# Patient Record
Sex: MALE | Race: WHITE | NOT HISPANIC OR LATINO | Employment: OTHER | URBAN - METROPOLITAN AREA
[De-identification: names, ages, dates, MRNs, and addresses within clinical notes are randomized per-mention and may not be internally consistent; named-entity substitution may affect disease eponyms.]

---

## 2017-06-08 ENCOUNTER — GENERIC CONVERSION - ENCOUNTER (OUTPATIENT)
Dept: OTHER | Facility: OTHER | Age: 82
End: 2017-06-08

## 2017-08-15 ENCOUNTER — ALLSCRIPTS OFFICE VISIT (OUTPATIENT)
Dept: OTHER | Facility: OTHER | Age: 82
End: 2017-08-15

## 2017-10-17 ENCOUNTER — GENERIC CONVERSION - ENCOUNTER (OUTPATIENT)
Dept: OTHER | Facility: OTHER | Age: 82
End: 2017-10-17

## 2018-01-10 NOTE — PROGRESS NOTES
Assessment    1  Benign essential hypertension (401 1) (I10)   2  Controlled insulin dependent diabetes mellitus (250 00,V58 67) (E11 9,Z79 4)   3  Hyperlipidemia (272 4) (E78 5)   4  Encounter for preventive health examination (V70 0) (Z00 00)    Plan  Health Maintenance    · Follow-up visit in 1 year Evaluation and Treatment  Follow-up  Status: Hold For -  Scheduling  Requested for: [id number]  Screening for diabetic retinopathy    · *VB - Foot Exam; Status:Active - Retrospective By Protocol Authorization; Requested  for:[id number];     Discussion/Summary    #1  Hypertension  As noted patient's blood pressure is mildly elevated today but he has shown good control consistently with all other visits  Patient will continue present medication and surveillance with his other specialists and change in medication as needed  #2  Controlled insulin-dependent diabetes mellitus  Patient states that his sugar is under control as per his endocrinologist and patient has no evidence of diabetic neuropathy on examination today  Patient continues to check his sugars twice a day and sees his endocrinologist twice a year  #3  Hyperlipidemia  We do not have a recent lipid profile and patient states this is obtained through his cardiologist  We will research the results and discuss them with the patient if necessary  #4  Health maintenance  Again patient states he comes or office once a year just to report as to his health and welfare but is refusing any testing or further evaluation  Patient is refusing flu vaccine, pneumonia vaccine, tetanus, shingles vaccine  Patient states he no longer will be getting routine colonoscopies but will call if any abnormal bleeding or problems with his bowels  Impression: Subsequent Annual Wellness Visit, with preventive exam as well as age and risk appropriate counseling completed       Cardiovascular screening and counseling: the risks and benefits of screening were discussed and screening is current  Diabetes screening and counseling: the risks and benefits of screening were discussed and screening is current  Colorectal cancer screening and counseling: the risks and benefits of screening were discussed and screening is current  Prostate cancer screening and counseling: the risks and benefits of screening were discussed and screening is current  Osteoporosis screening and counseling: the risks and benefits of screening were discussed and the patient declines screening  Abdominal aortic aneurysm screening and counseling: the risks and benefits of screening were discussed, the patient declines screening and Dx - V81 2 Screen for CV Disorder  Glaucoma screening and counseling: the risks and benefits of screening were discussed and screening is current  HIV screening and counseling: the risks and benefits of screening were discussed and screening not indicated  Hepatitis C Screening: the patient was counseled on Hepatitis C screening  Immunizations: the risks and benefits of influenza vaccination were discussed with the patient, the patient declines the influenza vaccination, the risks and benefits of pneumococcal vaccination were discussed with the patient, the patient declines the pneumococcal vaccination, hepatitis B vaccination series is not indicated at this time due to the patient's low risk of eyad the disease, the risks and benefits of the Zostavax vaccine were discussed with the patient, the patient declines the Zostavax vaccine, the risks and benefits of the Td vaccine were discussed with the patient and the patient declines the Td vaccine  Advance Directive Planning: complete and up to date  Patient Discussion: plan discussed with the patient, follow-up visit needed in one year        Chief Complaint  Patient is here today for an annual wellness exam      History of Present Illness  HPI: Patient is a 50-year-old male with a history of hypertension, diabetes mellitus type 2, BPH without obstructive symptoms, hyperlipidemia, DJD  Patient's here today for a Medicare wellness visit  Patient had no labs performed prior to the visit today and refuses to do so being as all his labs etc  taking care of by his endocrinologist, cardiologist, urologist  Patient states he's feeling well and the only complaint he has is some discomfort in the right ankle with walking  He states this is been going on for a few weeks and he was hoping it would go away on its own  Patient denies any accident or injury  Patient states his sugar showing good control and his endocrinologist is pleased with his blood sugars  His hemoglobin A1c is not recorded but we will try to obtain this  Welcome to [employer] and Wellness Visits: The patient is being seen for the subsequent annual wellness visit  Co-Managers and Medical Equipment/Suppliers: See Patient Care Team   Reviewed Updated [facility]:   Last Medicare Wellness Visit Information was reviewed, patient interviewed, no change since last AWV  Preventive Quality Program 65 and Older: Falls Risk: The patient fell 0 times in the past 12 months  The patient is currently asymptomatic Symptoms Include: The patient is currently experiencing urinary symptoms  Urinary Incontinence Symptoms includes: nocturia    Dates he has nocturia at least 2-3 times per night  Date of last glaucoma screen was He is seen by his ophthalmologist once a year and does have diabetic screening along with glaucoma test      Review of Systems    Constitutional: negative  Head and Face: negative  Eyes: negative  ENT: negative  Cardiovascular: negative  Respiratory: negative  Gastrointestinal: negative  Genitourinary: nocturia, but no dysuria, no urinary frequency, no urinary urgency, no urinary incontinence, no hematuria, no pelvic pain and no testicular pain     Musculoskeletal: diffuse joint pain and joint stiffness, but negative, no generalized muscle aches, no back pain, no joint swelling, no back muscle spasm, no pain in other joints and no limping  Integumentary and Breasts: negative  Neurological: negative  Psychiatric: negative  Hematologic and Lymphatic: negative  Active Problems    1  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,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) (I25 10)   2  Benign essential hypertension (401 1) (I10)   3  Controlled insulin dependent diabetes mellitus (250 00,V58 67) (E11 9,Z79 4)   4  Enlarged prostate without lower urinary tract symptoms (luts) (600 00) (N40 0)   5  Hyperlipidemia (272 4) (E78 5)   6  Screening for diabetic retinopathy (V80 2) (Z13 5)   7  Urticaria (708 9) (L50 9)    Social History    · Never A Smoker    Current Meds   1  Aspir-81 81 MG Oral Tablet Delayed Release; Take 1 tablet daily Recorded   2  Levemir 100 UNIT/ML Subcutaneous Solution; INJECT SUBCUTANEOUSLY EVERY 12   HOURS AS DIRECTED Recorded   3  Lisinopril 10 MG Oral Tablet; Therapy: [id number] to (Last Rx:[id number])  Requested for: [id number] Ordered   4  MetFORMIN HCl - 1000 MG Oral Tablet; Therapy: [id number] to (Last Rx:[id number])  Requested for: 51Elm1158 Ordered   5  Simvastatin 40 MG Oral Tablet; Therapy: 68OCA6807 to (Last Rx:[id number])  Requested for: 17Qez5663 Ordered    Allergies    1  No Known Drug Allergies    Vitals  Signs    Temperature: 96 8 F  Heart Rate: 51  Systolic: 231  Diastolic: 76  Height: 5 ft 10 in  Weight: 176 lb   BMI Calculated: 25 25  BSA Calculated: 1 98  O2 Saturation: 99    Physical Exam    Constitutional articulate 81-year-old male who is awake alert in no acute distress oriented x3  Head and Face   Head and face: Normal     Palpation of the face and sinuses: No sinus tenderness  Eyes   Conjunctiva and lids: No erythema, swelling or discharge  Pupils and irises: Equal, round, reactive to light      Ears, Nose, Mouth, and Throat   External inspection of ears and nose: Normal     Otoscopic examination: Tympanic membranes translucent with normal light reflex  Canals patent without erythema  Hearing: Normal     Nasal mucosa, septum, and turbinates: Normal without edema or erythema  Lips, teeth, and gums: Normal, good dentition  Oropharynx: Normal with no erythema, edema, exudate or lesions  Neck   Neck: Supple, symmetric, trachea midline, no masses  Thyroid: Normal, no thyromegaly  Pulmonary   Respiratory effort: No increased work of breathing or signs of respiratory distress  Percussion of chest: Normal     Palpation of chest: Normal     Auscultation of lungs: Clear to auscultation  Cardiovascular   Palpation of heart: Normal PMI, no thrills  Auscultation of heart: Normal rate and rhythm, normal S1 and S2, no murmurs  Carotid pulses: 2+ bilaterally  Abdominal aorta: Normal     Femoral pulses: 2+ bilaterally  Pedal pulses: Abnormal   +1 dorsalis pedis and anterior tibial arteries pulses  Examination of extremities for edema and/or varicosities: Normal     Chest   Breasts: Normal, no dimpling or skin changes appreciated  Palpation of breasts and axillae: Normal, no masses palpated  Chest: Normal     Abdomen   Abdomen: Non-tender, no masses  Liver and spleen: No hepatomegaly or splenomegaly  Examination for hernias: Abnormal   Small umbilical hernia  Stool sample for occult blood: Abnormal   Refuses rectal examination  Genitourinary Patient sees urologist on a yearly basis and he defers exam today  Lymphatic   Palpation of lymph nodes in neck: No lymphadenopathy  Palpation of lymph nodes in axillae: No lymphadenopathy  Palpation of lymph nodes in groin: No lymphadenopathy  Palpation of lymph nodes in other areas: No lymphadenopathy  Musculoskeletal   Gait and station: Normal     Inspection/palpation of digits and nails: Normal without clubbing or cyanosis      Inspection/palpation of joints, bones, and muscles: Abnormal   Evaluation of his right foot and ankle shows that patient has flat feet but no other gross deformity  He has some tenderness to palpation to the insertion of the Achilles tendon and decreased dorsi and plantarflexion  Range of motion: Normal     Stability: Normal     Muscle strength/tone: Normal     Skin   Skin and subcutaneous tissue: Normal without rashes or lesions  Palpation of skin and subcutaneous tissue: Normal turgor  Neurologic   Cranial nerves: Cranial nerves 2-12 intact  Cortical function: Normal mental status  Reflexes: 2+ and symmetric  Sensation: No sensory loss  Coordination: Normal finger to nose and heel to shin  Diabetic Foot Exam: The toes were normal  The sensory exam showed diminished vibratory sensation at the level of the toes and diminished position sense at the level of the toes  The toes were normal    Monofilament Testing: diminished tactile sensation with monofilament testing throughout both feet  Vascular: Pulses: 1+ in the posterior tibialis and 1+ in the dorsalis pedis  Capillary refills findings on the left were normal in the toes  Pulses: 1+ in the posterior tibialis and 1+ in the dorsalis pedis  Psychiatric   Judgment and insight: Normal     Orientation to person, place and time: Normal     Recent and remote memory: Intact  Mood and affect: Normal        Results/Data  Falls Risk Assessment (Dx Z13 89 Screen for Neurologic Disorder) [id number] 06:33PM User, Bunch     Test Name Result Flag Reference   Falls Risk      No falls in the past year     PHQ-2 Adult Depression Screening [id number] 06:33PM User, [employer]ina [employer]     Test Name Result Flag Reference   PHQ-2 Adult Depression Score 0     Over the last two weeks, how often have you been bothered by any of the following problems?   Little interest or pleasure in doing things: Not at all - 0  Feeling down, depressed, or hopeless: Not at all - 0   PHQ-2 Adult Depression Screening Negative         Future Appointments    Date/Time Provider Specialty Site   08/23/2018 02:00 PM Jimmy Carroll DO Internal Medicine [facility]     Signatures   Electronically signed by : Ernesto Alicea DO; Aug 15 2017  7:27PM EST                       (Author)

## 2018-01-14 VITALS
SYSTOLIC BLOOD PRESSURE: 144 MMHG | HEIGHT: 70 IN | DIASTOLIC BLOOD PRESSURE: 76 MMHG | OXYGEN SATURATION: 99 % | BODY MASS INDEX: 25.2 KG/M2 | TEMPERATURE: 96.8 F | HEART RATE: 51 BPM | WEIGHT: 176 LBS

## 2018-01-23 NOTE — PROGRESS NOTES
Assessment    1  Benign essential hypertension (401 1) (I10)   2  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,440 9) (I25 10)   3  Enlarged prostate without lower urinary tract symptoms (luts) (600 00) (N40 0)   4  Controlled insulin dependent diabetes mellitus (250 00,V58 67) (E11 9,Z79 4)   5  Hyperlipidemia (272 4) (E78 5)    Plan  Hyperlipidemia    · Follow-up visit in 1 year Evaluation and Treatment  Follow-up  Status: Hold For -  Scheduling  Requested for: 09VTM7973   · *VB-Foot Exam; Status:Active; Requested for:43Vay2254;     Discussion/Summary    #1  Hypertension  Patient's blood pressure continues to be under good control  He continues to have this followed not only in our office but with his cardiologist and endocrinologist  I told the patient that if there's any changes or need to change medication to contact our office immediately for evaluation  #2  Benign prostatic hypertrophy with history of hypertension  Patient continues to follow-up with his urologist and states he's had no new symptoms or problems  He has no increased nocturia  #3  Coronary artery disease  Patient continues to follow-up with his cardiologist  He states he's having no problems with chest pain or increasing shortness of breath with exertion  #4  Diabetes mellitus type 2  Patient states his sugars are under control but we have had no recent correspondence with his endocrinologist  He also states that he is getting a yearly diabetic rectal examination  We will obtain this information and included in the patient's chart  #5  Hyperlipidemia  Again patient has lipid profile performed by other physicians and he states he is under good control  Impression: Subsequent Annual Wellness Visit, with preventive exam as well as age and risk appropriate counseling completed  Cardiovascular screening and counseling: the risks and benefits of screening were discussed and screening is current     Diabetes screening and counseling: the risks and benefits of screening were discussed and screening is current  Colorectal cancer screening and counseling: the risks and benefits of screening were discussed and screening is current  Prostate cancer screening and counseling: the risks and benefits of screening were discussed and screening is current  Osteoporosis screening and counseling: the risks and benefits of screening were discussed and screening is current  Abdominal aortic aneurysm screening and counseling: screening not indicated  Glaucoma screening and counseling: the risks and benefits of screening were discussed and screening is current  HIV screening and counseling: screening not indicated  Immunizations: the risks and benefits of influenza vaccination were discussed with the patient, the patient declines the influenza vaccination, the risks and benefits of pneumococcal vaccination were discussed with the patient, the patient declines the pneumococcal vaccination, hepatitis B prevention counseling was provided, the patient declines the hepatitis vaccination series, the risks and benefits of the Zostavax vaccine were discussed with the patient, the patient declines the Zostavax vaccine, the risks and benefits of the Td vaccine were discussed with the patient and the patient declines the Td vaccine  Advance Directive Planning: complete and up to date  Patient Discussion: plan discussed with the patient, follow-up visit needed in one year  Chief Complaint  patient is here for a Medicare wellness exam      Advance Directives  Advance Directive [facility]:   YES - Patient has an advance health care directive  The patient has a living will located  in patient's home  Durable Power of  For Healthcare:    Name:    Relationship: Wife   Phone (home): Home phone number   Alternate Health Care Proxy:    Name: Patient states he has no bloody as a healthcare proxy otherwise   Capacity/Competence:  This patient has full decision making capacity for discussion of advance care planning and This patient has full decision making competency for discussion of advance care planning  Summary of Advance Directive Conversation  In the event of any catastrophic illness patient states he does not want any heroic measures, no CPR, no intubation, no into biotic treatment, no IV hydration  The provider spent 5 minutes minutes discussing Advance Directives  History of Present Illness  HPI: Patient is an 81-year-old male with a history of multiple medical problems including hypertension, hyperlipidemia, BPH with obstructive symptoms, coronary artery disease, diabetes mellitus type 2  Patient is here today for routine follow-up after a one-year period of time  Patient states he does not want to be seen more often and this is because he sees the cardiologist, endocrinologist, urologist on a regular basis  He states he's been feeling well and again he refuses to have any routine labs performed because he has these all through his other physicians  Welcome to [employer] and Wellness Visits: The patient is being seen for the subsequent annual wellness visit  Medicare Screening and Risk Factors   Hospitalizations: he has been previously hospitalizied  Once per lifetime medicare screening tests: ECG  Medicare Screening Tests Risk Questions   Abdominal aortic aneurysm risk assessment: over 72years of age  Osteoporosis risk assessment:  and over 48years of age  HIV risk assessment: none indicated  Drug and Alcohol Use: The patient has never smoked cigarettes  The patient reports rare alcohol use and drinking Approximately 2 beers per week drinks per week  Alcohol concern:   The patient has no concerns about alcohol abuse  He has never used illicit drugs  Diet and Physical Activity: Current diet includes well balanced meals and limited junk food  He exercises infrequently  Exercise: walking     Mood Disorder and Cognitive Impairment Screening: Geriatric Depression Scale   Depression screening score was Total is 0 on the M geriatric depression scale     negative for symptoms  He denies feeling down, depressed, or hopeless over the past two weeks  He denies feeling little interest or pleasure in doing things over the past two weeks  Cognitive impairment screening: denies difficulty learning/retaining new information, denies difficulty handling complex tasks, denies difficulty with reasoning, denies difficulty with spatial ability and orientation, denies difficulty with language, denies difficulty with behavior and Total score of 29 on the Mini-Mental Status exam    Functional Ability/Level of Safety: Hearing is slightly decreased and a hearing aid is used  He reports hearing difficulties  The patient is currently able to do activities of daily living without limitations, able to do instrumental activities of daily living without limitations, able to participate in social activities without limitations and able to drive without limitations  Activities of daily living details: does not need help using the phone, no transportation help needed, does not need help shopping, no meal preparation help needed, does not need help doing housework, does not need help doing laundry, does not need help managing medications and does not need help managing money  Fall risk factors:  antihypertensive use, but The patient fell 0 times in the past 12 months , no polypharmacy, no alcohol use, no mobility impairment, no antidepressant use, no deconditioning, no postural hypotension, no sedative use, no visual impairment, no urinary incontinence, no cognitive impairment, up and go test was normal and no previous fall  Home safety risk factors:  no unfamiliar surroundings, no loose rugs, no poor household lighting, no uneven floors, no household clutter, grab bars in the bathroom and handrails on the stairs     Advance Directives: Advance directives: living will, durable power of  for health care directives and advance directives  end of life decisions were reviewed with the patient and I agree with the patient's decisions  Co-Managers and Medical Equipment/Suppliers: See Patient Care Team   Reviewed Updated [facility]:   Last Medicare Wellness Visit Information was reviewed, patient interviewed and updates made to the record today  Preventive Quality Program 65 and Older: Falls Risk: The patient fell 0 times in the past 12 months  Associated symptoms:  No associated symptoms are reported  Urinary Incontinence Symptoms includes: nocturia, but no urinary incontinence, no incomplete bladder emptying, no urinary frequency, no urinary urgency, no urinary hesitancy, no dysuria, no straining, no weak stream, no intermittent stream, no post-void dribbling, no vaginal pressure and no vaginal dryness   Patient states he had glaucoma screening and a diabetic eye exam approximately one month ago with his ophthalmologist      Review of Systems    Constitutional: negative  Head and Face: negative  Eyes: Patient does see the eye doctor on a yearly basis and does have a diabetic eye exam, but negative  ENT: negative  Cardiovascular: negative  Respiratory: negative  Gastrointestinal: negative  Genitourinary: negative  Musculoskeletal: negative  Integumentary and Breasts: negative  Neurological: negative  Psychiatric: negative  Endocrine: negative  Hematologic and Lymphatic: negative  Score 0   Normal 0 - 9, Mild Depression 10 - 19, Severe Depression 20 - 30      Active Problems    1  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,440 9) (I25 10)   2  Benign essential hypertension (401 1) (I10)   3  Controlled insulin dependent diabetes mellitus (250 00,V58 67) (E11 9,Z79 4)   4  Enlarged prostate without lower urinary tract symptoms (luts) (600 00) (N40 0)   5  Hyperlipidemia (272 4) (E78 5)   6   Screening for diabetic retinopathy (V80 2) (Z13 5)   7  Urticaria (708 9) (L50 9)    Surgical History    The surgical history was reviewed and updated today  Family History    The family history was reviewed and updated today  Social History    · Never A Smoker  The social history was reviewed and updated today  The social history was reviewed and is unchanged  Current Meds   1  Aspir-81 81 MG Oral Tablet Delayed Release; Take 1 tablet daily Recorded   2  Levemir 100 UNIT/ML Subcutaneous Solution; INJECT SUBCUTANEOUSLY EVERY 12   HOURS AS DIRECTED Recorded   3  Lisinopril 10 MG Oral Tablet; Therapy: 06TQD5514 to (Last Rx:[id number])  Requested for: 74XVE5105 Ordered   4  Medrol (Darryn) 4 MG TABS; Take as directed; Therapy: 57Bqs4805 to (Last Rx:20Neh3185) Ordered   5  MetFORMIN HCl - 1000 MG Oral Tablet; Therapy: 69HSN8348 to (Last Rx:94Pgu5838)  Requested for: 62Frl4928 Ordered   6  Simvastatin 40 MG Oral Tablet; Therapy: 34VCR8285 to (Last Rx:08Glb0947)  Requested for: 30Fll6314 Ordered    Allergies    1  No Known Drug Allergies    Vitals  Signs    Systolic: 878  Diastolic: 64  Heart Rate: 50  Respiration: 16  Temperature: 97 6 F  O2 Saturation: 99  Weight: 174 lb     Physical Exam    Constitutional , articulate 81-year-old male who is awake alert in no acute distress oriented x3  Head and Face   Head and face: Normal     Palpation of the face and sinuses: No sinus tenderness  Eyes   Conjunctiva and lids: No erythema, swelling or discharge  Pupils and irises: Equal, round, reactive to light  Ophthalmoscopic examination: Normal fundi and optic discs  Ears, Nose, Mouth, and Throat   External inspection of ears and nose: Normal     Otoscopic examination: Tympanic membranes translucent with normal light reflex  Canals patent without erythema  Hearing: Normal     Nasal mucosa, septum, and turbinates: Normal without edema or erythema  Lips, teeth, and gums: Normal, good dentition  Oropharynx: Normal with no erythema, edema, exudate or lesions  Neck   Neck: Supple, symmetric, trachea midline, no masses  Thyroid: Normal, no thyromegaly  Pulmonary   Respiratory effort: No increased work of breathing or signs of respiratory distress  Percussion of chest: Normal     Palpation of chest: Normal     Auscultation of lungs: Clear to auscultation  Cardiovascular   Palpation of heart: Normal PMI, no thrills  Auscultation of heart: Normal rate and rhythm, normal S1 and S2, no murmurs  Carotid pulses: 2+ bilaterally  Abdominal aorta: Normal     Femoral pulses: 2+ bilaterally  Pedal pulses: Abnormal   +1 dorsalis pedis and anterior tibial arteries pulses  Examination of extremities for edema and/or varicosities: Normal     Chest   Breasts: Normal, no dimpling or skin changes appreciated  Palpation of breasts and axillae: Normal, no masses palpated  Chest: Normal     Abdomen   Abdomen: Non-tender, no masses  Liver and spleen: No hepatomegaly or splenomegaly  Examination for hernias: Abnormal   Small umbilical hernia  Anus, perineum, and rectum: Normal sphincter tone, no masses, no prolapse  Stool sample for occult blood: Abnormal   Refuses rectal examination  Genitourinary Patient sees urologist on a yearly basis and he defers exam today  Lymphatic   Palpation of lymph nodes in neck: No lymphadenopathy  Palpation of lymph nodes in axillae: No lymphadenopathy  Palpation of lymph nodes in groin: No lymphadenopathy  Palpation of lymph nodes in other areas: No lymphadenopathy  Musculoskeletal   Gait and station: Normal     Inspection/palpation of digits and nails: Normal without clubbing or cyanosis  Inspection/palpation of joints, bones, and muscles: Abnormal   Mild diffuse degenerative changes but no acute lesions     Range of motion: Normal     Stability: Normal     Muscle strength/tone: Normal     Skin   Skin and subcutaneous tissue: Normal without rashes or lesions  Palpation of skin and subcutaneous tissue: Normal turgor  Neurologic   Cranial nerves: Cranial nerves 2-12 intact  Cortical function: Normal mental status  Reflexes: 2+ and symmetric  Sensation: No sensory loss  Coordination: Normal finger to nose and heel to shin  Diabetic Foot Exam: Right Foot Findings: dryness, but no swelling, no erythema, no tenderness, no maceration and no calluses  The toes were normal  The sensory exam showed diminished vibratory sensation at the level of the toes and diminished position sense at the level of the toes  Left Foot Findings: dryness, but no swelling, no erythema, no tenderness, no warmth, no maceration, no calluses, no pre-ulcers and no ulcers  The toes were normal    Monofilament Testing: diminished tactile sensation with monofilament testing throughout both feet  Vascular: Pulses: 1+ in the posterior tibialis and 1+ in the dorsalis pedis  Capillary refills findings on the left were normal in the toes  Pulses: 1+ in the posterior tibialis and 1+ in the dorsalis pedis  Psychiatric   Judgment and insight: Normal     Orientation to person, place and time: Normal     Recent and remote memory: Intact  Mood and affect: Normal        Results/Data  PHQ-2 Adult Depression Screening [id number] 03:03PM User, Ahs     Test Name Result Flag Reference   PHQ-2 Adult Depression Score 0     Over the last two weeks, how often have you been bothered by any of the following problems?   Little interest or pleasure in doing things: Not at all - 0  Feeling down, depressed, or hopeless: Not at all - 0   PHQ-2 Adult Depression Screening Negative         Future Appointments    Date/Time Provider Specialty Site   08/01/2017 01:00 PM Maciel Nash DO Internal Medicine [address] [place] [address] INTERNAL MED     Signatures   Electronically signed by : Angela Zamora DO; Jul 28 2016  5:26PM EST                       (Author)

## 2018-08-23 ENCOUNTER — APPOINTMENT (EMERGENCY)
Dept: CT IMAGING | Facility: HOSPITAL | Age: 83
End: 2018-08-23
Payer: COMMERCIAL

## 2018-08-23 ENCOUNTER — OFFICE VISIT (OUTPATIENT)
Dept: INTERNAL MEDICINE CLINIC | Facility: CLINIC | Age: 83
End: 2018-08-23

## 2018-08-23 ENCOUNTER — APPOINTMENT (EMERGENCY)
Dept: RADIOLOGY | Facility: HOSPITAL | Age: 83
End: 2018-08-23
Payer: COMMERCIAL

## 2018-08-23 ENCOUNTER — HOSPITAL ENCOUNTER (OUTPATIENT)
Facility: HOSPITAL | Age: 83
Setting detail: OBSERVATION
Discharge: HOME HEALTH | End: 2018-08-24
Attending: EMERGENCY MEDICINE | Admitting: INTERNAL MEDICINE
Payer: COMMERCIAL

## 2018-08-23 VITALS
DIASTOLIC BLOOD PRESSURE: 78 MMHG | WEIGHT: 167 LBS | SYSTOLIC BLOOD PRESSURE: 126 MMHG | HEART RATE: 79 BPM | TEMPERATURE: 101.5 F | OXYGEN SATURATION: 96 % | HEIGHT: 70 IN | BODY MASS INDEX: 23.91 KG/M2

## 2018-08-23 DIAGNOSIS — I10 BENIGN ESSENTIAL HYPERTENSION: ICD-10-CM

## 2018-08-23 DIAGNOSIS — R26.2 AMBULATORY DYSFUNCTION: ICD-10-CM

## 2018-08-23 DIAGNOSIS — R42 DIZZINESS: ICD-10-CM

## 2018-08-23 DIAGNOSIS — IMO0001 CONTROLLED INSULIN DEPENDENT DIABETES MELLITUS: ICD-10-CM

## 2018-08-23 DIAGNOSIS — E86.0 DEHYDRATION: ICD-10-CM

## 2018-08-23 DIAGNOSIS — I25.10 ARTERIOSCLEROTIC CARDIOVASCULAR DISEASE: ICD-10-CM

## 2018-08-23 DIAGNOSIS — A41.9 SEPSIS, DUE TO UNSPECIFIED ORGANISM: Primary | ICD-10-CM

## 2018-08-23 DIAGNOSIS — E78.00 PURE HYPERCHOLESTEROLEMIA: ICD-10-CM

## 2018-08-23 DIAGNOSIS — R50.9 FEBRILE ILLNESS, ACUTE: Primary | ICD-10-CM

## 2018-08-23 PROBLEM — E87.20 LACTIC ACIDOSIS: Status: ACTIVE | Noted: 2018-08-23

## 2018-08-23 PROBLEM — E87.2 LACTIC ACIDOSIS: Status: ACTIVE | Noted: 2018-08-23

## 2018-08-23 LAB
ALBUMIN SERPL BCP-MCNC: 3.6 G/DL (ref 3.5–5)
ALP SERPL-CCNC: 62 U/L (ref 46–116)
ALT SERPL W P-5'-P-CCNC: 44 U/L (ref 12–78)
ANION GAP SERPL CALCULATED.3IONS-SCNC: 10 MMOL/L (ref 4–13)
APTT PPP: 41 SECONDS (ref 24–36)
AST SERPL W P-5'-P-CCNC: 33 U/L (ref 5–45)
BACTERIA UR QL AUTO: ABNORMAL /HPF
BASOPHILS # BLD MANUAL: 0 THOUSAND/UL (ref 0–0.1)
BASOPHILS NFR MAR MANUAL: 0 % (ref 0–1)
BILIRUB SERPL-MCNC: 0.7 MG/DL (ref 0.2–1)
BILIRUB UR QL STRIP: NEGATIVE
BUN SERPL-MCNC: 24 MG/DL (ref 5–25)
CALCIUM SERPL-MCNC: 8.9 MG/DL (ref 8.3–10.1)
CHLORIDE SERPL-SCNC: 102 MMOL/L (ref 100–108)
CLARITY UR: CLEAR
CO2 SERPL-SCNC: 27 MMOL/L (ref 21–32)
COLOR UR: YELLOW
CREAT SERPL-MCNC: 1.64 MG/DL (ref 0.6–1.3)
EOSINOPHIL # BLD MANUAL: 0 THOUSAND/UL (ref 0–0.4)
EOSINOPHIL NFR BLD MANUAL: 0 % (ref 0–6)
ERYTHROCYTE [DISTWIDTH] IN BLOOD BY AUTOMATED COUNT: 13 % (ref 11.6–15.1)
GFR SERPL CREATININE-BSD FRML MDRD: 38 ML/MIN/1.73SQ M
GLUCOSE SERPL-MCNC: 169 MG/DL (ref 65–140)
GLUCOSE SERPL-MCNC: 211 MG/DL (ref 65–140)
GLUCOSE UR STRIP-MCNC: NEGATIVE MG/DL
HCT VFR BLD AUTO: 42.5 % (ref 36.5–49.3)
HGB BLD-MCNC: 14.5 G/DL (ref 12–17)
HGB UR QL STRIP.AUTO: NEGATIVE
HYALINE CASTS #/AREA URNS LPF: ABNORMAL /LPF
INR PPP: 1.04 (ref 0.86–1.17)
KETONES UR STRIP-MCNC: NEGATIVE MG/DL
LACTATE SERPL-SCNC: 2.2 MMOL/L (ref 0.5–2)
LEUKOCYTE ESTERASE UR QL STRIP: NEGATIVE
LG PLATELETS BLD QL SMEAR: PRESENT
LYMPHOCYTES # BLD AUTO: 0.89 THOUSAND/UL (ref 0.6–4.47)
LYMPHOCYTES # BLD AUTO: 15 % (ref 14–44)
MCH RBC QN AUTO: 30.8 PG (ref 26.8–34.3)
MCHC RBC AUTO-ENTMCNC: 34.1 G/DL (ref 31.4–37.4)
MCV RBC AUTO: 90 FL (ref 82–98)
MONOCYTES # BLD AUTO: 0.18 THOUSAND/UL (ref 0–1.22)
MONOCYTES NFR BLD: 3 % (ref 4–12)
MUCOUS THREADS UR QL AUTO: ABNORMAL
NEUTROPHILS # BLD MANUAL: 4.81 THOUSAND/UL (ref 1.85–7.62)
NEUTS BAND NFR BLD MANUAL: 3 % (ref 0–8)
NEUTS SEG NFR BLD AUTO: 78 % (ref 43–75)
NITRITE UR QL STRIP: NEGATIVE
NON-SQ EPI CELLS URNS QL MICRO: ABNORMAL /HPF
NRBC BLD AUTO-RTO: 0 /100 WBCS
PH UR STRIP.AUTO: 5.5 [PH] (ref 4.5–8)
PLATELET # BLD AUTO: 174 THOUSANDS/UL (ref 149–390)
PLATELET BLD QL SMEAR: ADEQUATE
PMV BLD AUTO: 10.7 FL (ref 8.9–12.7)
POTASSIUM SERPL-SCNC: 4.2 MMOL/L (ref 3.5–5.3)
PROT SERPL-MCNC: 7.4 G/DL (ref 6.4–8.2)
PROT UR STRIP-MCNC: ABNORMAL MG/DL
PROTHROMBIN TIME: 13.3 SECONDS (ref 11.8–14.2)
RBC # BLD AUTO: 4.71 MILLION/UL (ref 3.88–5.62)
RBC #/AREA URNS AUTO: ABNORMAL /HPF
SL AMB  POCT GLUCOSE, UA: ABNORMAL
SL AMB LEUKOCYTE ESTERASE,UA: ABNORMAL
SL AMB POCT BILIRUBIN,UA: ABNORMAL
SL AMB POCT BLOOD,UA: ABNORMAL
SL AMB POCT CLARITY,UA: ABNORMAL
SL AMB POCT COLOR,UA: ABNORMAL
SL AMB POCT KETONES,UA: ABNORMAL
SL AMB POCT NITRITE,UA: ABNORMAL
SL AMB POCT PH,UA: 5
SL AMB POCT SPECIFIC GRAVITY,UA: 1.01
SL AMB POCT URINE PROTEIN: ABNORMAL
SL AMB POCT UROBILINOGEN: ABNORMAL
SODIUM SERPL-SCNC: 139 MMOL/L (ref 136–145)
SP GR UR STRIP.AUTO: 1.02 (ref 1–1.03)
TOTAL CELLS COUNTED SPEC: 100
TROPONIN I SERPL-MCNC: <0.02 NG/ML
UROBILINOGEN UR QL STRIP.AUTO: 0.2 E.U./DL
VARIANT LYMPHS # BLD AUTO: 1 %
WBC # BLD AUTO: 5.94 THOUSAND/UL (ref 4.31–10.16)
WBC #/AREA URNS AUTO: ABNORMAL /HPF

## 2018-08-23 PROCEDURE — 93005 ELECTROCARDIOGRAM TRACING: CPT

## 2018-08-23 PROCEDURE — 85730 THROMBOPLASTIN TIME PARTIAL: CPT | Performed by: EMERGENCY MEDICINE

## 2018-08-23 PROCEDURE — 81001 URINALYSIS AUTO W/SCOPE: CPT

## 2018-08-23 PROCEDURE — 82948 REAGENT STRIP/BLOOD GLUCOSE: CPT

## 2018-08-23 PROCEDURE — 96361 HYDRATE IV INFUSION ADD-ON: CPT

## 2018-08-23 PROCEDURE — 74176 CT ABD & PELVIS W/O CONTRAST: CPT

## 2018-08-23 PROCEDURE — 80053 COMPREHEN METABOLIC PANEL: CPT | Performed by: EMERGENCY MEDICINE

## 2018-08-23 PROCEDURE — 87040 BLOOD CULTURE FOR BACTERIA: CPT | Performed by: EMERGENCY MEDICINE

## 2018-08-23 PROCEDURE — 36415 COLL VENOUS BLD VENIPUNCTURE: CPT | Performed by: EMERGENCY MEDICINE

## 2018-08-23 PROCEDURE — 96365 THER/PROPH/DIAG IV INF INIT: CPT

## 2018-08-23 PROCEDURE — 71046 X-RAY EXAM CHEST 2 VIEWS: CPT

## 2018-08-23 PROCEDURE — 85610 PROTHROMBIN TIME: CPT | Performed by: EMERGENCY MEDICINE

## 2018-08-23 PROCEDURE — 85007 BL SMEAR W/DIFF WBC COUNT: CPT | Performed by: EMERGENCY MEDICINE

## 2018-08-23 PROCEDURE — 1111F DSCHRG MED/CURRENT MED MERGE: CPT | Performed by: INTERNAL MEDICINE

## 2018-08-23 PROCEDURE — 84484 ASSAY OF TROPONIN QUANT: CPT | Performed by: EMERGENCY MEDICINE

## 2018-08-23 PROCEDURE — 99214 OFFICE O/P EST MOD 30 MIN: CPT | Performed by: INTERNAL MEDICINE

## 2018-08-23 PROCEDURE — 83605 ASSAY OF LACTIC ACID: CPT | Performed by: EMERGENCY MEDICINE

## 2018-08-23 PROCEDURE — 99285 EMERGENCY DEPT VISIT HI MDM: CPT

## 2018-08-23 PROCEDURE — 85027 COMPLETE CBC AUTOMATED: CPT | Performed by: EMERGENCY MEDICINE

## 2018-08-23 PROCEDURE — 99220 PR INITIAL OBSERVATION CARE/DAY 70 MINUTES: CPT | Performed by: INTERNAL MEDICINE

## 2018-08-23 PROCEDURE — 81002 URINALYSIS NONAUTO W/O SCOPE: CPT | Performed by: INTERNAL MEDICINE

## 2018-08-23 RX ORDER — LISINOPRIL 10 MG/1
10 TABLET ORAL DAILY
COMMUNITY
Start: 2012-01-09

## 2018-08-23 RX ORDER — SODIUM CHLORIDE 9 MG/ML
100 INJECTION, SOLUTION INTRAVENOUS CONTINUOUS
Status: DISCONTINUED | OUTPATIENT
Start: 2018-08-23 | End: 2018-08-24 | Stop reason: HOSPADM

## 2018-08-23 RX ORDER — PRAVASTATIN SODIUM 20 MG
20 TABLET ORAL
Status: DISCONTINUED | OUTPATIENT
Start: 2018-08-24 | End: 2018-08-24 | Stop reason: HOSPADM

## 2018-08-23 RX ORDER — LISINOPRIL 10 MG/1
10 TABLET ORAL DAILY
Status: DISCONTINUED | OUTPATIENT
Start: 2018-08-24 | End: 2018-08-24 | Stop reason: HOSPADM

## 2018-08-23 RX ORDER — SIMVASTATIN 40 MG
40 TABLET ORAL
COMMUNITY
Start: 2012-02-08

## 2018-08-23 RX ORDER — ASPIRIN 81 MG/1
81 TABLET ORAL DAILY
Status: DISCONTINUED | OUTPATIENT
Start: 2018-08-24 | End: 2018-08-24 | Stop reason: HOSPADM

## 2018-08-23 RX ORDER — ONDANSETRON 2 MG/ML
4 INJECTION INTRAMUSCULAR; INTRAVENOUS EVERY 6 HOURS PRN
Status: DISCONTINUED | OUTPATIENT
Start: 2018-08-23 | End: 2018-08-24 | Stop reason: HOSPADM

## 2018-08-23 RX ORDER — ASPIRIN 81 MG/1
1 TABLET ORAL DAILY
COMMUNITY

## 2018-08-23 RX ORDER — ACETAMINOPHEN 325 MG/1
650 TABLET ORAL EVERY 6 HOURS PRN
Status: DISCONTINUED | OUTPATIENT
Start: 2018-08-23 | End: 2018-08-24 | Stop reason: HOSPADM

## 2018-08-23 RX ADMIN — SODIUM CHLORIDE 1000 ML: 0.9 INJECTION, SOLUTION INTRAVENOUS at 15:32

## 2018-08-23 RX ADMIN — ONDANSETRON 4 MG: 2 INJECTION INTRAMUSCULAR; INTRAVENOUS at 20:16

## 2018-08-23 RX ADMIN — SODIUM CHLORIDE 100 ML/HR: 0.9 INJECTION, SOLUTION INTRAVENOUS at 19:58

## 2018-08-23 RX ADMIN — INSULIN DETEMIR 15 UNITS: 100 INJECTION, SOLUTION SUBCUTANEOUS at 21:31

## 2018-08-23 RX ADMIN — ACETAMINOPHEN 650 MG: 325 TABLET, FILM COATED ORAL at 20:16

## 2018-08-23 RX ADMIN — CEFEPIME HYDROCHLORIDE 2000 MG: 2 INJECTION, POWDER, FOR SOLUTION INTRAVENOUS at 17:05

## 2018-08-23 RX ADMIN — INSULIN LISPRO 1 UNITS: 100 INJECTION, SOLUTION INTRAVENOUS; SUBCUTANEOUS at 21:30

## 2018-08-23 NOTE — ASSESSMENT & PLAN NOTE
Reports history of CAD with stent placement  Stable without any chest pain  Follows with cardiologist at [place] [address]

## 2018-08-23 NOTE — ED NOTES
Patient c/o feeling "woosy" upon initiating and ending ambulation trial   Stumbled while attempting to get into bed  Stated, "I'm ok!"  Dr Stephan Pemberton aware  Patient also shaky upon getting back into bed        Jenna Pang RN  08/23/18 [id number]

## 2018-08-23 NOTE — ASSESSMENT & PLAN NOTE
Patient has a longstanding history of hypertension  He was on lisinopril 10 milligrams a day  Patient states he is continuing to take the medication and with his dehydration and this may cause problems with either hyperkalemia or acute kidney failure  Again hopefully all the labs will be obtained for further evaluation in the emergency room

## 2018-08-23 NOTE — ASSESSMENT & PLAN NOTE
Patient is a he 71-year-old male with a history of multiple medical problems including hypertension, hyperlipidemia, coronary artery disease, diabetes mellitus type 2  Patient originally was here today for Medicare wellness visit  Patient is being seen for sick visit and apparently has been ill for approximately 2 weeks with fever, chills, rigors, cough, episodes of diarrhea last week which were profuse  Patient has slightly labored breathing today but no hypoxia  After evaluation no specific etiology could be found for his illness but his urine does show evidence of dehydration with a higher specific gravity level  Patient needs further workup and evaluation immediately and was sent to the emergency room for evaluation  We did send information to the ER regarding this patient

## 2018-08-23 NOTE — ED NOTES
Patient asked to give urine specimen prior to initiating abx  Will attempt to give sample        Alexandria Lockwood RN  08/23/18 9559

## 2018-08-23 NOTE — ASSESSMENT & PLAN NOTE
No results found for: HGBA1C    No results for input(s): POCGLU in the last 72 hours  Blood Sugar Average: Last 72 hrs:   patient does have a history of insulin-dependent diabetes  Patient has been neglect full and has not gone for testing to look at the control of his blood sugar  He he states he does not follow a diet but is taking his insulin as directed  Patient is being sent to the emergency room for evaluation and once patient has been medically cleared and improved we will then arrange for follow-up for further evaluation and checking a fasting blood sugar, hemoglobin A1c, urine for microalbumin    We will also discuss diabetic foot exam and patient seeing an ophthalmologist for diabetic eye care

## 2018-08-23 NOTE — ASSESSMENT & PLAN NOTE
Unknown etiology    Imaging studies negative  Monitor temperature  Check pro calcitonin  Follow blood cultures  Received cefepime in ED, will hold off further antibiotics

## 2018-08-23 NOTE — ASSESSMENT & PLAN NOTE
Likely due to diarrhea and decreased p o  Intake    IV normal saline  Reassess hydration status a m

## 2018-08-23 NOTE — PROGRESS NOTES
Assessment/Plan:    Dehydration  Patient is a he 81-year-old male with a history of multiple medical problems including hypertension, hyperlipidemia, coronary artery disease, diabetes mellitus type 2  Patient originally was here today for Medicare wellness visit  Patient is being seen for sick visit and apparently has been ill for approximately 2 weeks with fever, chills, rigors, cough, episodes of diarrhea last week which were profuse  Patient has slightly labored breathing today but no hypoxia  After evaluation no specific etiology could be found for his illness but his urine does show evidence of dehydration with a higher specific gravity level  Patient needs further workup and evaluation immediately and was sent to the emergency room for evaluation  We did send information to the ER regarding this patient  Controlled insulin dependent diabetes mellitus ([facility] [address] )  No results found for: HGBA1C    No results for input(s): POCGLU in the last 72 hours  Blood Sugar Average: Last 72 hrs:   patient does have a history of insulin-dependent diabetes  Patient has been neglect full and has not gone for testing to look at the control of his blood sugar  He he states he does not follow a diet but is taking his insulin as directed  Patient is being sent to the emergency room for evaluation and once patient has been medically cleared and improved we will then arrange for follow-up for further evaluation and checking a fasting blood sugar, hemoglobin A1c, urine for microalbumin  We will also discuss diabetic foot exam and patient seeing an ophthalmologist for diabetic eye care    Arteriosclerotic cardiovascular disease  Patient does have a history of atherosclerotic cardiovascular disease with myocardial infarction in the past   We have no records of patient being seen by cardiologist recently and we will obtain studies looking specifically his cholesterol    Patient will have routine evaluation in the emergency room including an EKG and we will obtain the results  Benign essential hypertension  Patient has a longstanding history of hypertension  He was on lisinopril 10 milligrams a day  Patient states he is continuing to take the medication and with his dehydration and this may cause problems with either hyperkalemia or acute kidney failure  Again hopefully all the labs will be obtained for further evaluation in the emergency room  Diagnoses and all orders for this visit:    Sepsis, due to unspecified organism [facility])  -     Transfer to other facility    Dehydration  -     Transfer to other facility    Pure hypercholesterolemia    Benign essential hypertension    Arteriosclerotic cardiovascular disease    Controlled insulin dependent diabetes mellitus ([facility] [address] )    Other orders  -     aspirin (ASPIR-81) 81 mg EC tablet; Take 1 tablet by mouth daily  -     insulin detemir (LEVEMIR) 100 units/mL subcutaneous injection; Inject under the skin  -     lisinopril (ZESTRIL) 10 mg tablet; Take by mouth  -     metFORMIN (GLUCOPHAGE) 1000 MG tablet; Take by mouth  -     simvastatin (ZOCOR) 40 mg tablet; Take by mouth          Subjective:      Patient ID: Frederic Donahue  is a 81 y o  male  Patient is an 60-year-old male with a history of multiple medical problems as outlined previously including insulin-dependent diabetes mellitus, BPH, hyperlipidemia, hypertension, atherosclerotic cardiovascular disease  Patient was here originally for Medicare wellness visit but presented to the office septic and dehydrated  Patient states he has been ill for approximately 2 weeks with originally some stomach cramps and diarrhea, patient now is having fevers, rigors, chills, profound weakness  With evaluation the patient will be sent to the emergency room  I discussed with the patient and [facility] did that he go by ambulance to the facility for evaluation but he refuses    We sent a notice to the ER on the patient's status and they should be able to read this note today  Patient does need a full workup and evaluation for the cause of his sepsis and treatment is indicated        The following portions of the patient's history were reviewed and updated as appropriate:   He  has no past medical history on file  He   Patient Active Problem List    Diagnosis Date Noted    Dehydration 08/23/2018    Arteriosclerotic cardiovascular disease 02/07/2013    Benign essential hypertension 02/07/2013    Controlled insulin dependent diabetes mellitus ([facility] [address] ) 02/07/2013    Enlarged prostate without lower urinary tract symptoms (luts) 02/07/2013    Hyperlipidemia 02/07/2013     He  has no past surgical history on file  His family history is not on file  He  reports that he has quit smoking  He has never used smokeless tobacco  His alcohol and drug histories are not on file  Current Outpatient Prescriptions   Medication Sig Dispense Refill    aspirin (ASPIR-81) 81 mg EC tablet Take 1 tablet by mouth daily      insulin detemir (LEVEMIR) 100 units/mL subcutaneous injection Inject under the skin      lisinopril (ZESTRIL) 10 mg tablet Take by mouth      metFORMIN (GLUCOPHAGE) 1000 MG tablet Take by mouth      simvastatin (ZOCOR) 40 mg tablet Take by mouth       No current facility-administered medications for this visit  No current outpatient prescriptions on file prior to visit  No current facility-administered medications on file prior to visit  He has No Known Allergies       Review of Systems   Constitutional: Positive for activity change (Decreased activity level secondary to fatigue and instability), appetite change ( recent difficulties with decreased appetite), chills, diaphoresis, fatigue, fever and unexpected weight change  HENT: Positive for congestion and sore throat   Negative for dental problem, drooling, ear discharge, ear pain, facial swelling, hearing loss, mouth sores, nosebleeds, postnasal drip, rhinorrhea, sinus pain, sinus pressure, sneezing, tinnitus and trouble swallowing  Eyes: Negative  Respiratory: Positive for cough and shortness of breath  Negative for apnea, choking, chest tightness, wheezing and stridor  Cardiovascular: Negative  Gastrointestinal: Positive for abdominal pain ( intermittent bouts of abdominal pain and cramping), diarrhea and nausea  Negative for abdominal distention, anal bleeding, blood in stool, constipation, rectal pain and vomiting  Endocrine: Negative  Genitourinary: Negative  Darker color to his urine   Musculoskeletal: Negative  Skin: Negative  Allergic/Immunologic: Negative  Neurological: Positive for dizziness and weakness  Negative for tremors, seizures, syncope, facial asymmetry, speech difficulty, light-headedness, numbness and headaches  Hematological: Negative  Psychiatric/Behavioral: Negative  Objective:      /78   Pulse 79   Temp (!) 101 5 °F (38 6 °C)   Ht 5' 10" (1 778 m)   Wt 75 8 kg (167 lb)   SpO2 96%   BMI 23 96 kg/m²          Physical Exam   Constitutional: He appears well-developed  He appears distressed  Moderately ill her appearing feverish 27-year-old male who is awake but lethargic, unsteady gait secondary to weakness   HENT:   Head: Normocephalic and atraumatic  Right Ear: External ear normal    Left Ear: External ear normal    Nose: Nose normal    Mouth/Throat: No oropharyngeal exudate  Oral mucosa is pink but dry   Eyes: Conjunctivae and EOM are normal  Pupils are equal, round, and reactive to light  Right eye exhibits no discharge  Left eye exhibits no discharge  No scleral icterus  Neck: Normal range of motion  Neck supple  Cardiovascular: Normal rate, regular rhythm, normal heart sounds and intact distal pulses  Pulmonary/Chest: Breath sounds normal  No respiratory distress  He has no wheezes  He has no rales  He exhibits no tenderness  Patient has decreased breath sounds anteriorly and posteriorly  Some what increased respiratory rate at 20 breaths per minute,  I could not appreciate rales rhonchi or wheezes on examination today   Abdominal: Soft  He exhibits no distension and no mass  There is no tenderness  There is no rebound and no guarding  Patient on exam has decreased bowel sounds throughout, no organomegaly could be appreciated no rebound or rigidity   Musculoskeletal: Normal range of motion  Neurological: He is alert  No cranial nerve deficit  Coordination abnormal    Skin: Skin is warm  No rash noted  He is diaphoretic  There is erythema (Patient has flushing to the facial features)  No pallor  Psychiatric: His behavior is normal  Judgment and thought content normal    Somewhat lethargic affect   Nursing note and vitals reviewed

## 2018-08-23 NOTE — ED PROVIDER NOTES
History  Chief Complaint   Patient presents with    Dizziness     Wife reports "He is unsteady, dizzy, nauseated, diarrhea, and dehydrated  Dr said he needs to be here and get xrays" started a week ago       History provided by:  Patient   used: No    Dizziness   Quality:  Lightheadedness  Severity:  Moderate  Onset quality:  Gradual  Duration:  1 week  Timing:  Constant  Progression:  Worsening  Chronicity:  New  Relieved by:  Nothing  Worsened by:  Nothing  Associated symptoms: diarrhea and nausea    Associated symptoms: no blood in stool, no chest pain, no palpitations, no shortness of breath and no vomiting        Prior to Admission Medications   Prescriptions Last Dose Informant Patient Reported? Taking?   aspirin (ASPIR-81) 81 mg EC tablet   Yes Yes   Sig: Take 1 tablet by mouth daily   insulin detemir (LEVEMIR) 100 units/mL subcutaneous injection   Yes Yes   Sig: Inject 27 Units under the skin daily at bedtime 12units Qam and 15units QHS    lisinopril (ZESTRIL) 10 mg tablet   Yes Yes   Sig: Take 10 mg by mouth daily     metFORMIN (GLUCOPHAGE) 1000 MG tablet   Yes Yes   Sig: Take 1,000 mg by mouth 2 (two) times a day with meals     simvastatin (ZOCOR) 40 mg tablet   Yes Yes   Sig: Take 40 mg by mouth daily at bedtime        Facility-Administered Medications: None       Past Medical History:   Diagnosis Date    Diabetes mellitus ([facility] [address] )     Hypertension        Past Surgical History:   Procedure Laterality Date    CARDIAC SURGERY         History reviewed  No pertinent family history  I have reviewed and agree with the history as documented  Social History   Substance Use Topics    Smoking status: Former Smoker     Types: Cigarettes    Smokeless tobacco: Never Used    Alcohol use Yes      Comment: sparingly        Review of Systems   Constitutional: Positive for chills and fever  Negative for diaphoresis  Respiratory: Negative for shortness of breath      Cardiovascular: Negative for chest pain and palpitations  Gastrointestinal: Positive for diarrhea and nausea  Negative for blood in stool and vomiting  Genitourinary: Negative for dysuria and frequency  Skin: Negative for rash  Neurological: Positive for dizziness  All other systems reviewed and are negative  Physical Exam  Physical Exam   Constitutional: He is oriented to person, place, and time  He appears well-developed and well-nourished  He appears distressed  HENT:   Head: Normocephalic and atraumatic  Eyes: EOM are normal  Pupils are equal, round, and reactive to light  Neck: Normal range of motion  No JVD present  Cardiovascular: Normal rate, regular rhythm, normal heart sounds and intact distal pulses  Exam reveals no gallop and no friction rub  No murmur heard  Pulmonary/Chest: Effort normal and breath sounds normal  No respiratory distress  He has no wheezes  He has no rales  He exhibits no tenderness  Abdominal: Soft  He exhibits no distension  There is no tenderness  There is no guarding  Musculoskeletal: Normal range of motion  He exhibits no tenderness  Neurological: He is alert and oriented to person, place, and time  Skin: Skin is warm and dry  Capillary refill takes less than 2 seconds  Psychiatric: He has a normal mood and affect  His behavior is normal  Judgment and thought content normal    Nursing note and vitals reviewed        Vital Signs  ED Triage Vitals   Temperature Pulse Respirations Blood Pressure SpO2   08/23/18 1518 08/23/18 1518 08/23/18 1518 08/23/18 1518 08/23/18 1518   99 2 °F (37 3 °C) 69 20 123/58 99 %      Temp Source Heart Rate Source Patient Position - Orthostatic VS BP Location FiO2 (%)   08/23/18 1518 08/23/18 1518 08/23/18 1518 08/23/18 1518 --   Oral Monitor Lying Right arm       Pain Score       08/23/18 1817       No Pain           Vitals:    08/23/18 1518 08/23/18 1817   BP: 123/58 133/63   Pulse: 69 60   Patient Position - Orthostatic VS: Lying Lying Visual Acuity      ED Medications  Medications   sodium chloride 0 9 % bolus 1,000 mL (0 mL Intravenous Stopped 8/23/18 1705)   cefepime (MAXIPIME) 2 g/50 mL dextrose IVPB (0 mg Intravenous Stopped 8/23/18 1746)       Diagnostic Studies  Results Reviewed     Procedure Component Value Units Date/Time    Troponin I [[id number]]  (Normal) Collected:  08/23/18 1746    Lab Status:  Final result Specimen:  Blood from Arm, Right Updated:  08/23/18 1815     Troponin I <0 02 ng/mL     Urine Microscopic [[id number]]  (Abnormal) Collected:  08/23/18 1711    Lab Status:  Final result Specimen:  Urine from Urine, Clean Catch Updated:  08/23/18 1725     RBC, UA None Seen /hpf      WBC, UA 0-1 (A) /hpf      Epithelial Cells Occasional /hpf      Bacteria, UA Occasional /hpf      Hyaline Casts, UA 2-4 (A) /lpf      MUCOUS THREADS Occasional (A)    ED Urine Macroscopic [[id number]]  (Abnormal) Collected:  08/23/18 1711    Lab Status:  Final result Specimen:  Urine Updated:  08/23/18 1701     Color, UA Yellow     Clarity, UA Clear     pH, UA 5 5     Leukocytes, UA Negative     Nitrite, UA Negative     Protein, UA 30 (1+) (A) mg/dl      Glucose, UA Negative mg/dl      Ketones, UA Negative mg/dl      Urobilinogen, UA 0 2 E U /dl      Bilirubin, UA Negative     Blood, UA Negative     Specific Gravity, UA 1 025    Narrative:       CLINITEK RESULT    CBC and differential [[id number]] Collected:  08/23/18 1528    Lab Status:  Final result Specimen:  Blood from Arm, Right Updated:  08/23/18 1625     WBC 5 94 Thousand/uL      RBC 4 71 Million/uL      Hemoglobin 14 5 g/dL      Hematocrit 42 5 %      MCV 90 fL      MCH 30 8 pg      MCHC 34 1 g/dL      RDW 13 0 %      MPV 10 7 fL      Platelets 265 Thousands/uL      nRBC 0 /100 WBCs     Narrative: This is an appended report  These results have been appended to a previously verified report      Lactic acid, plasma [[id number]]  (Abnormal) Collected:  08/23/18 1528    Lab Status:  Final result Specimen:  Blood from Arm, Right Updated:  08/23/18 1625     LACTIC ACID 2 2 (HH) mmol/L     Narrative:         Result may be elevated if tourniquet was used during collection  Comprehensive metabolic panel [[id number]]  (Abnormal) Collected:  08/23/18 1528    Lab Status:  Final result Specimen:  Blood from Arm, Right Updated:  08/23/18 1614     Sodium 139 mmol/L      Potassium 4 2 mmol/L      Chloride 102 mmol/L      CO2 27 mmol/L      Anion Gap 10 mmol/L      BUN 24 mg/dL      Creatinine 1 64 (H) mg/dL      Glucose 211 (H) mg/dL      Calcium 8 9 mg/dL      AST 33 U/L      ALT 44 U/L      Alkaline Phosphatase 62 U/L      Total Protein 7 4 g/dL      Albumin 3 6 g/dL      Total Bilirubin 0 70 mg/dL      eGFR 38 ml/min/1 73sq m     Narrative:         National Kidney Disease Education Program recommendations are as follows:  GFR calculation is accurate only with a steady state creatinine  Chronic Kidney disease less than 60 ml/min/1 73 sq  meters  Kidney failure less than 15 ml/min/1 73 sq  meters  Blood culture #1 [[id number]] Collected:  08/23/18 1536    Lab Status: In process Specimen:  Blood from Arm, Left Updated:  08/23/18 [address] [[id number]]  (Normal) Collected:  08/23/18 1528    Lab Status:  Final result Specimen:  Blood from Arm, Right Updated:  08/23/18 1604     Protime 13 3 seconds      INR 1 04    APTT [[id number]]  (Abnormal) Collected:  08/23/18 1528    Lab Status:  Final result Specimen:  Blood from Arm, Right Updated:  08/23/18 1604     PTT 41 (H) seconds     Blood culture #2 [[id number]] Collected:  08/23/18 1528    Lab Status: In process Specimen:  Blood from Arm, Right Updated:  08/23/18 1534                 XR chest 2 views   ED Interpretation by Krsiten Nelson MD (08/23 1842)   This film was interpreted independently by me  No infiltrate, cardiac silhouette normal, no pleural effusions or pulmonary edema         CT abdomen pelvis wo contrast   Final Result by Jo Hancock MD (08/23 1650)      1  Limited exam without IV and oral contrast   No definite acute findings  2   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  3   Right renal cyst      4   Marked prostatomegaly  Workstation performed: [id number]                    Procedures  ECG 12 Lead Documentation  Date/Time: 8/23/2018 6:02 PM  Performed by: Oliver Irving by: Georgette Mendosa     Indications / Diagnosis:  Weakness  ECG reviewed by me, the ED Provider: yes    Patient location:  ED  Previous ECG:     Previous ECG:  Compared to current    Comparison ECG info:  6/13/18    Similarity:  Changes noted (p wave morphology change)  Interpretation:     Interpretation: abnormal    Rate:     ECG rate:  65    ECG rate assessment: normal    Rhythm:     Rhythm: sinus rhythm    Ectopy:     Ectopy: PVCs      PVCs:  Infrequent  QRS:     QRS axis:  Normal    QRS intervals:  Normal  Conduction:     Conduction: normal    ST segments:     ST segments:  Normal  T waves:     T waves: normal             Phone Contacts  ED Phone Contact    ED Course                         Initial Sepsis Screening     [address] Name 08/23/18 1626                Is the patient's history suggestive of a new or worsening infection? (!)  Yes (Proceed)  -JW        Suspected source of infection suspect infection, source unknown  -JW        Are two or more of the following signs & symptoms of infection both present and new to the patient? No  -JW        Indicate SIRS criteria          If the answer is yes to both questions, suspicion of sepsis is present          If severe sepsis is present AND tissue hypoperfusion perists in the hour after fluid resuscitation or lactate > 4, the patient meets criteria for SEPTIC SHOCK          Are any of the following organ dysfunction criteria present within 6 hours of suspected infection and SIRS criteria that are NOT considered to be chronic conditions?           Organ dysfunction          Date of presentation of severe sepsis          Time of presentation of severe sepsis          Tissue hypoperfusion persists in the hour after crystalloid fluid administration, evidenced, by either:          Was hypotension present within one hour of the conclusion of crystalloid fluid administration?         Date of presentation of septic shock          Time of presentation of septic shock            User Key  (r) = Recorded By, (t) = Taken By, (c) = Cosigned By    [address] Name Provider Type    John Horton MD Physician                  MDM  Number of Diagnoses or Management Options  Ambulatory dysfunction: new and requires workup  Febrile illness, acute: new and requires workup  Diagnosis management comments: Background: 81 y o  male presents with weakness, fever and diarrhea for past week    Differential DX includes but is not limited to: colitis, enteritis, gastroenteritis, bacteremia, urinary tract infection    Plan: septic workup, ct abdomen          Amount and/or Complexity of Data Reviewed  Clinical lab tests: ordered and reviewed  Tests in the radiology section of CPT®: ordered and reviewed  Independent visualization of images, tracings, or specimens: yes    Risk of Complications, Morbidity, and/or Mortality  Presenting problems: high  Diagnostic procedures: high  Management options: high    Patient Progress  Patient progress: stable    CritCare Time    Disposition  Final diagnoses:   Febrile illness, acute   Ambulatory dysfunction     Time reflects when diagnosis was documented in both MDM as applicable and the Disposition within this note     Time User Action Codes Description Comment    8/23/2018  5:03 PM Ish Jones [C53  EMZM] Fall, initial encounter     8/23/2018  5:05 PM Jason Dobson [C40  GUKP] Fall, initial encounter     8/23/2018  6:43 PM Andrade Jones [R50 9] Febrile illness, acute     8/23/2018  6:44 PM Ish Jones [R26 2] Ambulatory dysfunction       ED Disposition     ED Disposition Condition Comment    Admit  Patient to be admitted as an observation admission to Dr Rocco Pena service  Follow-up Information    None         Patient's Medications   Discharge Prescriptions    No medications on file     No discharge procedures on file      ED Provider  Electronically Signed by           Halima Nguyen MD  08/23/18 9460

## 2018-08-23 NOTE — ASSESSMENT & PLAN NOTE
No results found for: HGBA1C    No results for input(s): POCGLU in the last 72 hours      Blood Sugar Average: Last 72 hrs:    Resume insulin regimen and monitor  Check A1c

## 2018-08-23 NOTE — ASSESSMENT & PLAN NOTE
Patient does have a history of atherosclerotic cardiovascular disease with myocardial infarction in the past   We have no records of patient being seen by cardiologist recently and we will obtain studies looking specifically his cholesterol  Patient will have routine evaluation in the emergency room including an EKG and we will obtain the results

## 2018-08-23 NOTE — ASSESSMENT & PLAN NOTE
Likely due to dehydration    Reassess after hydration  IV normal saline 100 cc/hour  Check orthostatic blood pressures

## 2018-08-23 NOTE — H&P
H&P- Elise Lance  1935, 81 y o  male MRN: [id number]    Unit/Bed#: [facility]RISTINE Encounter: [id number]    Primary Care Provider: Alejandrina Stroud DO   Date and time admitted to hospital: 8/23/2018  3:11 PM        * Dizziness   Assessment & Plan    Likely due to dehydration    Reassess after hydration  IV normal saline 100 cc/hour  Check orthostatic blood pressures        Dehydration   Assessment & Plan    Likely due to diarrhea and decreased p o  Intake    IV normal saline  Reassess hydration status a m  Fever   Assessment & Plan    Unknown etiology    Imaging studies negative  Monitor temperature  Check pro calcitonin  Follow blood cultures  Received cefepime in ED, will hold off further antibiotics        Lactic acidosis   Assessment & Plan    Likely due to dehydration    Recheck a m  Benign essential hypertension   Assessment & Plan    Not on any medications        Controlled insulin dependent diabetes mellitus ([facility] [address] )   Assessment & Plan    No results found for: HGBA1C    No results for input(s): POCGLU in the last 72 hours  Blood Sugar Average: Last 72 hrs:    Resume insulin regimen and monitor  Check A1c         Enlarged prostate without lower urinary tract symptoms (luts)   Assessment & Plan    Not symptomatic currently        Coronary artery disease involving native coronary artery of native heart   Assessment & Plan    Reports history of CAD with stent placement  Stable without any chest pain  Follows with cardiologist at [facility] ([facility])        Hyperlipidemia   Assessment & Plan    Continue statin          VTE Prophylaxis: Enoxaparin (Lovenox)  / sequential compression device   Code Status:  Full code  POLST: There is no POLST form on file for this patient (pre-hospital)  Discussion with family:  Wife at bedside    Anticipated Length of Stay:  Patient will be admitted on an Observation basis with an anticipated length of stay of  <2 midnights     Justification for Hospital Stay: Dehydration    Total Time for Visit, including Counseling / Coordination of Care: 1 hour  Greater than 50% of this total time spent on direct patient counseling and coordination of care  Chief Complaint:   Dizziness    History of Present Illness:    Viry Fulton  is a 81 y o  male who presents with dizziness  Patient was seen by PCP today and sent to the emergency department for further evaluation as he was noted to be dehydrated and dizzy on ambulation  Patient's family reports that he has been having diarrhea up until few days ago and has not been eating or drinking for few days  Patient's appetite has been poor  He also had low-grade temperature at home  Currently diarrhea resolved  Denies sick contacts or travel  Mild on pressure cough reported but no shortness of breath or chest pain  Review of Systems:    Review of Systems  Twelve point review systems negative except noted above  Past Medical and Surgical History:     Past Medical History:   Diagnosis Date    Diabetes mellitus ([facility] [address] )     Hypertension        Past Surgical History:   Procedure Laterality Date    CARDIAC SURGERY         Meds/Allergies:    Prior to Admission medications    Medication Sig Start Date End Date Taking? Authorizing Provider   aspirin (ASPIR-81) 81 mg EC tablet Take 1 tablet by mouth daily   Yes Historical Provider, MD   insulin detemir (LEVEMIR) 100 units/mL subcutaneous injection Inject 27 Units under the skin daily at bedtime 12units Qam and 15units QHS    Yes Historical Provider, MD   lisinopril (ZESTRIL) 10 mg tablet Take 10 mg by mouth daily   1/9/12  Yes Historical Provider, MD   metFORMIN (GLUCOPHAGE) 1000 MG tablet Take 1,000 mg by mouth 2 (two) times a day with meals   12/28/11  Yes Historical Provider, MD   simvastatin (ZOCOR) 40 mg tablet Take 40 mg by mouth daily at bedtime   2/8/12  Yes Historical Provider, MD     I have reviewed home medications with patient family member      Allergies: No Known Allergies    Social History:     Marital Status: Unknown   Occupation:  Retired  Patient Pre-hospital Living Situation:  Lives with wife  Patient Pre-hospital Level of Mobility:  Normal independent  Patient Pre-hospital Diet Restrictions:  None  Substance Use History:   History   Alcohol Use    Yes     Comment: sparingly     History   Smoking Status    Former Smoker    Types: Cigarettes   Smokeless Tobacco    Never Used     History   Drug Use No       Family History:    non-contributory    Physical Exam:     Vitals:   Blood Pressure: 133/63 (08/23/18 1817)  Pulse: 60 (08/23/18 1817)  Temperature: 99 °F (37 2 °C) (08/23/18 1620)  Temp Source: Oral (08/23/18 1620)  Respirations: 18 (08/23/18 1817)  Weight - Scale: 75 6 kg (166 lb 10 7 oz) (08/23/18 1518)  SpO2: 98 % (08/23/18 1817)    Physical Exam     Gen -Patient comfortable in bed  Dry mucous membranes  Neck- Supple  No thyromegaly or lymphadenopathy  Lungs-Clear bilaterally without any wheeze or rales   Heart S1-S2, regular rate and rhythm, no murmurs  Abdomen-soft nontender, no organomegaly  Bowel sounds present  Extremities-no cyanosi,  clubbing or edema  Skin- decreased skin turgor  Neuro-nonfocal         Additional Data:     Lab Results: I have personally reviewed pertinent reports  Results from last 7 days  Lab Units 08/23/18  1528   WBC Thousand/uL 5 94   HEMOGLOBIN g/dL 14 5   HEMATOCRIT % 42 5   PLATELETS Thousands/uL 174   LYMPHO PCT % 15   MONO PCT MAN % 3*   EOSINO PCT MANUAL % 0       Results from last 7 days  Lab Units 08/23/18  1528   SODIUM mmol/L 139   POTASSIUM mmol/L 4 2   CHLORIDE mmol/L 102   CO2 mmol/L 27   BUN mg/dL 24   CREATININE mg/dL 1 64*   CALCIUM mg/dL 8 9   TOTAL PROTEIN g/dL 7 4   BILIRUBIN TOTAL mg/dL 0 70   ALK PHOS U/L 62   ALT U/L 44   AST U/L 33   GLUCOSE RANDOM mg/dL 211*       Results from last 7 days  Lab Units 08/23/18  1528   INR  1 04               Imaging: I have personally reviewed pertinent reports        XR chest 2 views   ED Interpretation by Radha Zimmerman MD (08/23 7412)   This film was interpreted independently by me  No infiltrate, cardiac silhouette normal, no pleural effusions or pulmonary edema  CT abdomen pelvis wo contrast   Final Result by Saulo Dave MD (08/23 3050)      1  Limited exam without IV and oral contrast   No definite acute findings  2   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

## 2018-08-24 VITALS
OXYGEN SATURATION: 96 % | HEART RATE: 55 BPM | SYSTOLIC BLOOD PRESSURE: 125 MMHG | BODY MASS INDEX: 24.14 KG/M2 | RESPIRATION RATE: 18 BRPM | TEMPERATURE: 100.9 F | DIASTOLIC BLOOD PRESSURE: 56 MMHG | WEIGHT: 168.65 LBS | HEIGHT: 70 IN

## 2018-08-24 LAB
ANION GAP SERPL CALCULATED.3IONS-SCNC: 8 MMOL/L (ref 4–13)
ATRIAL RATE: 66 BPM
BUN SERPL-MCNC: 22 MG/DL (ref 5–25)
CALCIUM SERPL-MCNC: 7.6 MG/DL (ref 8.3–10.1)
CHLORIDE SERPL-SCNC: 105 MMOL/L (ref 100–108)
CO2 SERPL-SCNC: 24 MMOL/L (ref 21–32)
CREAT SERPL-MCNC: 1.42 MG/DL (ref 0.6–1.3)
ERYTHROCYTE [DISTWIDTH] IN BLOOD BY AUTOMATED COUNT: 13.1 % (ref 11.6–15.1)
EST. AVERAGE GLUCOSE BLD GHB EST-MCNC: 140 MG/DL
GFR SERPL CREATININE-BSD FRML MDRD: 46 ML/MIN/1.73SQ M
GLUCOSE SERPL-MCNC: 106 MG/DL (ref 65–140)
GLUCOSE SERPL-MCNC: 128 MG/DL (ref 65–140)
GLUCOSE SERPL-MCNC: 171 MG/DL (ref 65–140)
GLUCOSE SERPL-MCNC: 174 MG/DL (ref 65–140)
HBA1C MFR BLD: 6.5 % (ref 4.2–6.3)
HCT VFR BLD AUTO: 32.8 % (ref 36.5–49.3)
HGB BLD-MCNC: 11.5 G/DL (ref 12–17)
LACTATE SERPL-SCNC: 0.8 MMOL/L (ref 0.5–2)
MCH RBC QN AUTO: 31.2 PG (ref 26.8–34.3)
MCHC RBC AUTO-ENTMCNC: 35.1 G/DL (ref 31.4–37.4)
MCV RBC AUTO: 89 FL (ref 82–98)
P AXIS: 198 DEGREES
PLATELET # BLD AUTO: 141 THOUSANDS/UL (ref 149–390)
PMV BLD AUTO: 10.6 FL (ref 8.9–12.7)
POTASSIUM SERPL-SCNC: 4.3 MMOL/L (ref 3.5–5.3)
PR INTERVAL: 186 MS
PROCALCITONIN SERPL-MCNC: <0.05 NG/ML
QRS AXIS: 52 DEGREES
QRSD INTERVAL: 82 MS
QT INTERVAL: 394 MS
QTC INTERVAL: 409 MS
RBC # BLD AUTO: 3.69 MILLION/UL (ref 3.88–5.62)
SODIUM SERPL-SCNC: 137 MMOL/L (ref 136–145)
T WAVE AXIS: 47 DEGREES
VENTRICULAR RATE: 65 BPM
WBC # BLD AUTO: 6.82 THOUSAND/UL (ref 4.31–10.16)

## 2018-08-24 PROCEDURE — 93010 ELECTROCARDIOGRAM REPORT: CPT | Performed by: INTERNAL MEDICINE

## 2018-08-24 PROCEDURE — G8979 MOBILITY GOAL STATUS: HCPCS

## 2018-08-24 PROCEDURE — 97116 GAIT TRAINING THERAPY: CPT

## 2018-08-24 PROCEDURE — 83036 HEMOGLOBIN GLYCOSYLATED A1C: CPT | Performed by: INTERNAL MEDICINE

## 2018-08-24 PROCEDURE — 82948 REAGENT STRIP/BLOOD GLUCOSE: CPT

## 2018-08-24 PROCEDURE — 83605 ASSAY OF LACTIC ACID: CPT | Performed by: INTERNAL MEDICINE

## 2018-08-24 PROCEDURE — 80048 BASIC METABOLIC PNL TOTAL CA: CPT | Performed by: INTERNAL MEDICINE

## 2018-08-24 PROCEDURE — 97163 PT EVAL HIGH COMPLEX 45 MIN: CPT

## 2018-08-24 PROCEDURE — 99217 PR OBSERVATION CARE DISCHARGE MANAGEMENT: CPT | Performed by: INTERNAL MEDICINE

## 2018-08-24 PROCEDURE — G8978 MOBILITY CURRENT STATUS: HCPCS

## 2018-08-24 PROCEDURE — 85027 COMPLETE CBC AUTOMATED: CPT | Performed by: INTERNAL MEDICINE

## 2018-08-24 PROCEDURE — 84145 PROCALCITONIN (PCT): CPT | Performed by: INTERNAL MEDICINE

## 2018-08-24 RX ADMIN — ASPIRIN 81 MG: 81 TABLET, COATED ORAL at 08:51

## 2018-08-24 RX ADMIN — SODIUM CHLORIDE 100 ML/HR: 0.9 INJECTION, SOLUTION INTRAVENOUS at 05:39

## 2018-08-24 RX ADMIN — INSULIN LISPRO 1 UNITS: 100 INJECTION, SOLUTION INTRAVENOUS; SUBCUTANEOUS at 13:22

## 2018-08-24 NOTE — CASE MANAGEMENT
Thank you,  [address]  Utilization Review Department  Phone: [phone]; Fax [phone]  ATTENTION: Please call with any questions or concerns to [phone]  and carefully follow the prompts so that you are directed to the right person  Send all requests for admission clinical reviews, approved or denied determinations and any other requests to fax [phone]  All voicemails are confidential    Initial Clinical Review    Admission: Date/Time/Statement: OBSERVATION ADMISSION 08/23/18 @ 1844    Orders Placed This Encounter   Procedures    Place in Observation (expected length of stay for this patient is less than two midnights)     Standing Status:   Standing     Number of Occurrences:   1     Order Specific Question:   Admitting Physician     Answer:   Faith Carmen [[id number]]     Order Specific Question:   Level of Care     Answer:   Med Surg [16]         ED: Date/Time/Mode of Arrival:   ED Arrival Information     Expected Arrival [address] of Arrival Escorted By Service Admission Type    8/23/2018 14:29 8/23/2018 15:00 Urgent Walk-In Spouse General Medicine Urgent    Arrival Complaint    Dizziness          Chief Complaint:   Chief Complaint   Patient presents with    Dizziness     Wife reports "He is unsteady, dizzy, nauseated, diarrhea, and dehydrated  Dr said he needs to be here and get xrays" started a week ago       History of Illness: 81 y o  male who presents with dizziness  Patient was seen by PCP today and sent to the emergency department for further evaluation as he was noted to be dehydrated and dizzy on ambulation  Patient's family reports that he has been having diarrhea up until few days ago and has not been eating or drinking for few days  Patient's appetite has been poor  He also had low-grade temperature at home      ED Vital Signs:   ED Triage Vitals   Temperature Pulse Respirations Blood Pressure SpO2   08/23/18 1518 08/23/18 1518 08/23/18 1518 08/23/18 1518 08/23/18 1518   99 2 °F (37 3 °C) 69 20 123/58 99 %      Temp Source Heart Rate Source Patient Position - Orthostatic VS BP Location FiO2 (%)   08/23/18 1518 08/23/18 1518 08/23/18 1518 08/23/18 1518 --   Oral Monitor Lying Right arm       Pain Score       08/23/18 1817       No Pain        Wt Readings from Last 1 Encounters:   08/23/18 76 5 kg (168 lb 10 4 oz)       Vital Signs (abnormal): 08/23 08/24/18 0722 99 1 °F (37 3 °C)  55   101/49   08/23/18 2237 99 1 °F (37 3 °C)   47  101/55   08/23/18 2019  101 3 °F (38 5 °C)  61  161/70   08/23/18 1817 --  60  133/63   08/23/18 1620 99 °F (37 2 °C)  --  --   08/23/18 1535 --  --  --   08/23/18 1518 99 2 °F (37 3 °C)  69  123/58       Abnormal Labs/Diagnostic Test Results: 08/23:  Creatinine 1 64, glucose 211, lactic acid 2 2, PTT 41, urinalysis:  Protein, UA 30 (1+)       WBC, UA 0-1     Epithelial Cells Occasional    Bacteria, UA Occasional    Hyaline Casts, UA 2-4     MUCOUS THREADS Occasional     CT abdomen:  1   Limited exam without IV and oral contrast   No definite acute findings  2   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     3   Right renal cyst     4   Marked prostatomegaly  ED Treatment:   Medication Administration from 08/23/2018 1428 to 08/23/2018 1936       Date/Time Order Dose Route Action Comments     08/23/2018 1705 sodium chloride 0 9 % bolus 1,000 mL 0 mL Intravenous Stopped      08/23/2018 1532 sodium chloride 0 9 % bolus 1,000 mL 1,000 mL Intravenous New Bag      08/23/2018 1746 cefepime (MAXIPIME) 2 g/50 mL dextrose IVPB 0 mg Intravenous Stopped      08/23/2018 1705 cefepime (MAXIPIME) 2 g/50 mL dextrose IVPB 2,000 mg Intravenous New Bag           Past Medical/Surgical History:    Active Ambulatory Problems     Diagnosis Date Noted    Arteriosclerotic cardiovascular disease 02/07/2013    Benign essential hypertension 02/07/2013    [facility]rolled insulin dependent diabetes mellitus [facility] ) 02/07/2013    Enlarged prostate without lower urinary tract symptoms (luts) 02/07/2013    Hyperlipidemia 02/07/2013    Dehydration 08/23/2018     Resolved Ambulatory Problems     Diagnosis Date Noted    No Resolved Ambulatory Problems     Past Medical History:   Diagnosis Date    Diabetes mellitus ([facility] )     Hypertension        Admitting Diagnosis: Febrile illness, acute [R50 9]  Ambulatory dysfunction [R26 2]    Age/Sex: 81 y o  male    Assessment/Plan:   Dizziness   Assessment & Plan     Likely due to dehydration     Reassess after hydration  IV normal saline 100 cc/hour  Check orthostatic blood pressures          Dehydration   Assessment & Plan     Likely due to diarrhea and decreased p o   Intake     IV normal saline  Reassess hydration status a m           Fever   Assessment & Plan     Unknown etiology     Imaging studies negative  Monitor temperature  Check pro calcitonin  Follow blood cultures  Received cefepime in ED, will hold off further antibiotics          Lactic acidosis   Assessment & Plan     Likely due to dehydration     Recheck a m           Benign essential hypertension   Assessment & Plan     Not on any medications          Controlled insulin dependent diabetes mellitus (HCC)   Assessment & Plan     No results found for: HGBA1C     No results for input(s): POCGLU in the last 72 hours      Blood Sugar Average: Last 72 hrs:     Resume insulin regimen and monitor  Check A1c           Enlarged prostate without lower urinary tract symptoms (luts)   Assessment & Plan     Not symptomatic currently          Coronary artery disease involving native coronary artery of native heart   Assessment & Plan     Reports history of CAD with stent placement  Stable without any chest pain  Follows with cardiologist at [facility]           Hyperlipidemia   Assessment & Plan     Continue statin         Admission Orders:  Scheduled Meds:   Current Facility-Administered Medications:  acetaminophen 650 mg Oral Q6H PRN    aspirin 81 mg Oral Daily    insulin detemir 15 Units Subcutaneous HS    insulin lispro 1-5 Units Subcutaneous TID AC    insulin lispro 1-5 Units Subcutaneous HS    lisinopril 10 mg Oral Daily    ondansetron 4 mg Intravenous Q6H PRN    pravastatin 20 mg Oral Daily With Dinner    sodium chloride 100 mL/hr Intravenous Continuous Last Rate: 100 mL/hr (08/24/18 0539)     Continuous Infusions:   sodium chloride 100 mL/hr Last Rate: 100 mL/hr (08/24/18 0539)     PRN Meds:   acetaminophen    Ondansetron x1 IV  fingerstick gluc hs & before meals  Peripheral IV  Orthostatic BPs  Urine dip  Regular diet  Ambulate pt

## 2018-08-24 NOTE — PHYSICAL THERAPY NOTE
PHYSICAL THERAPY TREATMENT NOTE    Patient Name: Cecille Horta  Today's Date: 8/24/2018 08/24/18 1531   Pain Assessment   Pain Assessment No/denies pain   Restrictions/Precautions   Other Precautions Fall Risk   General   Family/Caregiver Present Yes   Cognition   Arousal/Participation Cooperative   Attention Attends with cues to redirect   Orientation Level Oriented X4;Other (Comment)  (pt was identified w/ full name and birth date)   Following Commands Follows one step commands with increased time or repetition   Subjective   Subjective pt agreed to PT intervention  Transfers   Sit to Stand 5  Supervision   Additional items Verbal cues  (for hand placement, LE positioning)   Stand to Sit 5  Supervision   Additional items Verbal cues  (for body positioning, hand placement)   Ambulation/Elevation   Gait pattern Decreased foot clearance;Shuffling   Gait Assistance 5  Supervision   Additional items Verbal cues; Tactile cues  (for walker placement, foot clearance, breathing technique)   Assistive Device Rolling walker   Distance 180 feet  (additional not possible due to fatigue)   Balance   Static Sitting Fair +   Dynamic Sitting Fair -   Static Standing Fair   Dynamic Standing Fair   Ambulatory Fair -  (w/ roller walker)   Activity Tolerance   Activity Tolerance Patient limited by fatigue   [facility]    Assessment   Prognosis Fair   Problem List Decreased strength;Decreased endurance; Impaired balance;Decreased mobility; Decreased coordination;Decreased safety awareness; Impaired tone   Assessment Therapist introduced roller walker use w/ ambulation to address mobility and physical impairments noted during evaluation  Pt was noted to have improvement in mobility status w/ use of walker w/ decreased level of assist needed to maintain safety and increased ambulation distance   Pt continues to require standby assist and verbal cues w/ all phases of mobility and is at risk for falling  continued inpatient PT tx is indicated to reduce fall risk factors  Goals   Patient Goals go belgica   STG Expiration Date 09/03/18   Short Term Goal #1 pt will: Increase bilateral LE strength 1/2 grade to facilitate independent mobility, Perform all bed mobility tasks independently to decrease fall risk factors, Perform all transfers independently to improve independence, Ambulate 500 ft  with least restrictive assistive device independently w/o LOB to expedite return to leisure activities like bowling, Navigate 12 stairs independently with unilateral handrail to facilitate return to previous living environment, Increase ambulatory balance 1 grade to decrease risk for falls, Complete exercise program independently to improve overall activity tolerance, Tolerate 3 hr OOB to faciliate upright tolerance and Improve Barthel Index score to 95 or greater to facilitate independence   Treatment Day 1   Plan   Treatment/Interventions Functional transfer training;LE strengthening/ROM; Elevations; Therapeutic exercise; Endurance training;Patient/family training;Equipment eval/education; Bed mobility;Gait training   Progress Progressing toward goals   PT Frequency Other (Comment)  (3 to 5x/week as able)   Recommendation   Recommendation Home PT; Other (Comment)  (roller walker for home use)   Equipment Recommended Other (Comment)  (roller walker)     Skilled inpatient PT recommended while in hospital to progress pt toward treatment goals      Brenda Ang, PT

## 2018-08-24 NOTE — PHYSICAL THERAPY NOTE
PHYSICAL THERAPY EVALUATION NOTE    Patient Name: Yunier Austin  Today's Date: 8/24/2018  AGE:   81 y o  Mrn:   [id number]  ADMIT DX:  Febrile illness, acute [R50 9]  Ambulatory dysfunction [R26 2]    Past Medical History:   Diagnosis Date    Diabetes mellitus ([facility] [address] )     Hypertension      Length Of Stay: 0  PHYSICAL THERAPY EVALUATION :    08/24/18 1521   Pain Assessment   Pain Assessment No/denies pain   Home Living   Type of [address] Multi-level;1/2 bath on main level;Bed/bath upstairs; Other (Comment)  ([address])   Additional Comments lives w/ wife  ambulates w/o device  independent w/ ADLs and driving  no falls in last 6 months  Prior Function   Comments pt seen supine in bed w/ wife present  agreed to participate in PT eval  denied pain  states feeling "woozy" at all times  pt further clarified sensation as feeling unsteady  pt wants to go home and go bowling     Restrictions/Precautions   Other Precautions Fall Risk   General   Additional Pertinent History 8/28/18 at 20:19, blood pressure was 161/70   Family/Caregiver Present Yes   Cognition   Arousal/Participation Cooperative   Orientation Level Oriented X4;Other (Comment)  (pt was identified w/ full name and birth date)   Following Commands Follows one step commands with increased time or repetition   Comments blood pressure supine 142/65 and 59 BPM, seated 137/63 and 62 BPM, standing 129/61 and 53 BPM, after 3 minutes 147/65 and 68 BPM    RUE Assessment   RUE Assessment WFL  (4-/5, shoulder 3+/5)   LUE Assessment   LUE Assessment WFL  (4-/5, shoulder 3+/5)   RLE Assessment   RLE Assessment WFL  (4-/5)   LLE Assessment   LLE Assessment WFL  (4-/5)   Coordination   Movements are Fluid and Coordinated 0   Coordination and Movement Description impaired coordination all extremities, intermittent tremors UEs   Light Touch   RLE Light Touch Grossly intact   LLE Light Touch Grossly intact   Bed Mobility   Supine to Sit 5  Supervision   Additional items HOB elevated; Bedrails; Increased time required   Sit to Supine 5  Supervision   Additional items Increased time required   Transfers   Sit to Stand 5  Supervision   Additional items Increased time required;Verbal cues  (for LE positioning, breathing technique)   Stand to Sit 5  Supervision   Additional items Verbal cues  (for body positioning, hand placement)   Ambulation/Elevation   Gait pattern Decreased foot clearance;Shuffling; Inconsistent don; Foward flexed; Excessively slow   Gait Assistance 4  Minimal assist   Additional items Assist x 1;Verbal cues; Tactile cues  (for obstacles, full step length, breathing technique)   Assistive Device None   Distance 140 feet  (additional not possible due to fatigue)   Stair Management Assistance 5  Supervision   Additional items Verbal cues; Tactile cues; Increased time required  (for railing use, foot clearance)   Stair Management Technique Two rails; Step to pattern   Number of Stairs 8  (additional not possible due to fatigue)   Balance   Static Sitting Fair +   Dynamic Sitting Fair -   Static Standing Fair -   Dynamic Standing Fair -   Ambulatory Poor +   Activity Tolerance   Activity Tolerance Patient limited by fatigue   Nurse Made Aware spoke to [facility]   Assessment   Prognosis Fair   Problem List Decreased strength;Decreased endurance; Impaired balance;Decreased mobility; Decreased coordination;Decreased safety awareness; Impaired tone   Assessment Pt was seen by PCP today and sent to the emergency department for further evaluation as he was noted to be dehydrated and dizzy on ambulation  Dx: dizziness, dehydration, fever, and lactic acidosis  order placed for PT eval and tx, w/ activity order of ambulate patient   pt presents w/ comorbidities of DM, HTN, and cardiac surgery and personal factors of advanced age, living in multi story house, stair(s) to enter home and limited insight into impairments  pt presents w/ weakness, decreased endurance, impaired balance, gait deviations, impaired coordination, impaired tone, decreased safety awareness and fall risk  these impairments are evident in findings from physical examination (weakness, impaired coordination and impaired tone), mobility assessment (need for standby to min assist w/ all phases of mobility when usually mobilizing independently, tolerance to only 140 feet of ambulation and need for cueing for mobility and breathing technique), and Barthel Index: 75/100  pt needed input for and mobility technique and safety  pt is at risk for falls due to physical and safety awareness deficits  pt's clinical presentation is unstable/unpredictable (evident in poor blood pressure control, need for assist w/ all phases of mobility when usually mobilizing independently, tolerance to only 140 feet of ambulation, pain impacting overall mobility status and need for input for mobility technique/safety)  pt needs inpatient PT tx to improve mobility deficits  discharge recommendation is for home PT to reduce fall risk and maximize level of functional independence  Goals   Patient Goals go belgica   [facility] Expiration Date 09/03/18   Short Term Goal #1 pt will:  Increase bilateral LE strength 1/2 grade to facilitate independent mobility, Perform all bed mobility tasks independently to decrease fall risk factors, Perform all transfers independently to improve independence, Ambulate 500 ft  with least restrictive assistive device independently w/o LOB to expedite return to leisure activities like bowling, Navigate 12 stairs independently with unilateral handrail to facilitate return to previous living environment, Increase ambulatory balance 1 grade to decrease risk for falls, Complete exercise program independently to improve overall activity tolerance, Tolerate 3 hr OOB to faciliate upright tolerance and Improve Barthel Index score to 95 or greater to facilitate independence   Plan   Treatment/Interventions Functional transfer training;LE strengthening/ROM; Elevations; Therapeutic exercise; Endurance training;Patient/family training;Equipment eval/education; Bed mobility;Gait training   PT Frequency Other (Comment)  (3 to 5x/week as able)   Recommendation   Recommendation Home PT   Barthel Index   Feeding 10   Bathing 0   Grooming Score 5   Dressing Score 5   Bladder Score 10   Bowels Score 10   Toilet Use Score 10   Transfers (Bed/Chair) Score 10   Mobility (Level Surface) Score 10   Stairs Score 5   Barthel Index Score 75     Skilled PT recommended while in hospital and upon DC to progress pt toward treatment goals       Cruz Micheleain, PT

## 2018-08-24 NOTE — DISCHARGE SUMMARY
Discharge Summary - [address] [facility]    Patient Information: Hai Head  81 y o  male MRN: [id number]  Unit/Bed#: -01 Encounter: [id number]    Discharging Physician / Practitioner: Joelle Buckner MD  PCP: Eva Hope DO  Admission Date: 8/23/2018  Discharge Date: 08/24/18    Disposition:     Home    Reason for Admission:  DIZZINESS    Discharge Diagnoses:     Principal Problem:    Dizziness  Active Problems:    Dehydration    Lactic acidosis    Fever    Controlled insulin dependent diabetes mellitus ([address] )    Enlarged prostate without lower urinary tract symptoms (luts)    Coronary artery disease involving native coronary artery of native heart    Hyperlipidemia  Resolved Problems:    * No resolved hospital problems  *      Consultations During Hospital Stay:  · None    Procedures Performed:     · Chest x-ray no active disease  · CT abdomen and pelvis without contrast:  No acute abnormality noted  Prostatomegaly noted    Significant Findings / Test Results:     As above      Hospital Course:     Hai Head  is a 81 y o  male patient who originally presented to the hospital on 8/23/2018 due to dizziness  Patient was seen by his PCP and was noted to be ill looking and dehydrated  He was subsequently sent to the emergency department for further workup  He had fever of 101 2° F in the emergency department  He had workup including chest x-ray CT abdomen and pelvis which did not show any acute findings except for prostatomegaly  His laboratory tests revealed elevated creatinine at 1 64  His WBC count was within normal range  His urine test were negative for any signs of infection  Patient was subsequently admitted and was started on IV hydration  Is dizziness and symptoms resolved  He remained afebrile while he is in the hospital   This progressed and levels were normal   Cultures were sent so far negative    He was seen by Physical therapy and recommended home physical therapy  He is being discharged in stable condition with VNA and home physical therapy  He was provided with contact numbers for urology for evaluation of prostatomegaly  Condition at Discharge: good     Discharge Day Visit / Exam:     Subjective:  Feeling better and anxious to go home  Vitals: Blood Pressure: 147/65 (08/24/18 1520)  Pulse: 68 (08/24/18 1520)  Temperature: 99 1 °F (37 3 °C) (08/24/18 0722)  Temp Source: Oral (08/24/18 0722)  Respirations: 18 (08/24/18 0722)  Height: 5' 10" (177 8 cm) (08/24/18 1456)  Weight - Scale: 76 5 kg (168 lb 10 4 oz) (08/23/18 2019)  SpO2: 97 % (08/24/18 0722)  Exam:   Physical Exam     Gen -Patient comfortable   Neck- Supple  No thyromegaly or lymphadenopathy  Lungs-Clear bilaterally without any wheeze or rales   Heart S1-S2, regular rate and rhythm, no murmurs  Abdomen-soft nontender, no organomegaly  Bowel sounds present  Extremities-no cyanosi,  clubbing or edema  Skin- no rash  Neuro-nonfocal       Discussion with Family: wife    Discharge instructions/Information to patient and family:   See after visit summary for information provided to patient and family  Provisions for Follow-Up Care:  See after visit summary for information related to follow-up care and any pertinent home health orders  Planned Readmission: no     Discharge Statement:  I spent 35 minutes discharging the patient  This time was spent on the day of discharge  I had direct contact with the patient on the day of discharge  Greater than 50% of the total time was spent examining patient, answering all patient questions, arranging and discussing plan of care with patient as well as directly providing post-discharge instructions  Additional time then spent on discharge activities  Discharge Medications:  See after visit summary for reconciled discharge medications provided to patient and family        ** Please Note: This note has been constructed using a voice recognition system **

## 2018-08-24 NOTE — NURSING NOTE
Pt sleeping comfortably  Chest rise noted symmetrically and respirations non labored  Will monitor pt

## 2018-08-24 NOTE — PLAN OF CARE
Problem: PHYSICAL THERAPY ADULT  Goal: Performs mobility at highest level of function for planned discharge setting  See evaluation for individualized goals  Treatment/Interventions: Functional transfer training, LE strengthening/ROM, Elevations, Therapeutic exercise, Endurance training, Patient/family training, Equipment eval/education, Bed mobility, Gait training          See flowsheet documentation for full assessment, interventions and recommendations  Outcome: Progressing  Prognosis: Fair  Problem List: Decreased strength, Decreased endurance, Impaired balance, Decreased mobility, Decreased coordination, Decreased safety awareness, Impaired tone  Assessment: Therapist introduced roller walker use w/ ambulation to address mobility and physical impairments noted during evaluation  Pt was noted to have improvement in mobility status w/ use of walker w/ decreased level of assist needed to maintain safety and increased ambulation distance  Pt continues to require standby assist and verbal cues w/ all phases of mobility and is at risk for falling  continued inpatient PT tx is indicated to reduce fall risk factors  Recommendation: Home PT, Other (Comment) (roller walker for home use)          See flowsheet documentation for full assessment

## 2018-08-24 NOTE — PLAN OF CARE
Problem: PHYSICAL THERAPY ADULT  Goal: Performs mobility at highest level of function for planned discharge setting  See evaluation for individualized goals  Treatment/Interventions: Functional transfer training, LE strengthening/ROM, Elevations, Therapeutic exercise, Endurance training, Patient/family training, Equipment eval/education, Bed mobility, Gait training          See flowsheet documentation for full assessment, interventions and recommendations  Prognosis: Fair  Problem List: Decreased strength, Decreased endurance, Impaired balance, Decreased mobility, Decreased coordination, Decreased safety awareness, Impaired tone  Assessment: Pt was seen by PCP today and sent to the emergency department for further evaluation as he was noted to be dehydrated and dizzy on ambulation  Dx: dizziness, dehydration, fever, and lactic acidosis  order placed for PT eval and tx, w/ activity order of ambulate patient  pt presents w/ comorbidities of DM, HTN, and cardiac surgery and personal factors of advanced age, living in multi story house, stair(s) to enter home and limited insight into impairments  pt presents w/ weakness, decreased endurance, impaired balance, gait deviations, impaired coordination, impaired tone, decreased safety awareness and fall risk  these impairments are evident in findings from physical examination (weakness, impaired coordination and impaired tone), mobility assessment (need for standby to min assist w/ all phases of mobility when usually mobilizing independently, tolerance to only 140 feet of ambulation and need for cueing for mobility and breathing technique), and Barthel Index: 75/100  pt needed input for and mobility technique and safety  pt is at risk for falls due to physical and safety awareness deficits   pt's clinical presentation is unstable/unpredictable (evident in poor blood pressure control, need for assist w/ all phases of mobility when usually mobilizing independently, tolerance to only 140 feet of ambulation, pain impacting overall mobility status and need for input for mobility technique/safety)  pt needs inpatient PT tx to improve mobility deficits  discharge recommendation is for home PT to reduce fall risk and maximize level of functional independence  Recommendation: Home PT          See flowsheet documentation for full assessment

## 2018-08-24 NOTE — SOCIAL WORK
S/w pt, wife, and daughter at bedside to discuss dcp recommendations  Made them aware that roller walker and home SN/PT have been recommended  They are agreeable and would like a referral placed to [facility]  They are agreeable to aliyah for DME  Walker delivered to bedside  Referral to [facility] placed and AVS updated  Nurse aware

## 2018-08-27 ENCOUNTER — TELEPHONE (OUTPATIENT)
Dept: INTERNAL MEDICINE CLINIC | Facility: CLINIC | Age: 83
End: 2018-08-27

## 2018-08-27 NOTE — TELEPHONE ENCOUNTER
Patients wife called  They saw you last week  He is worse now than what he was originally  He is very weak  I did schedule him for your next available appointment which is not until Friday, they did not want to see [facility]  She would like you to call her so she can discuss some things with you

## 2018-08-27 NOTE — TELEPHONE ENCOUNTER
Called patient's wife and suggested to her per Dr Mayrui Vaughan that if the patient is getting worse they should go to the ER for evaluation  Patient's wife understood

## 2018-08-28 ENCOUNTER — TELEPHONE (OUTPATIENT)
Dept: INTERNAL MEDICINE CLINIC | Facility: CLINIC | Age: 83
End: 2018-08-28

## 2018-08-28 LAB
BACTERIA BLD CULT: NORMAL
BACTERIA BLD CULT: NORMAL

## 2018-08-28 NOTE — TELEPHONE ENCOUNTER
PT called  They noticed that he has all the side effects from metformin  He does have an appointment with you tomorrow  They want to know if theres anything youd like them to do between now and tomorrows appointment  Please call patient if so

## 2018-08-29 ENCOUNTER — OFFICE VISIT (OUTPATIENT)
Dept: INTERNAL MEDICINE CLINIC | Facility: CLINIC | Age: 83
End: 2018-08-29
Payer: COMMERCIAL

## 2018-08-29 VITALS
TEMPERATURE: 98.6 F | OXYGEN SATURATION: 92 % | SYSTOLIC BLOOD PRESSURE: 112 MMHG | HEART RATE: 64 BPM | BODY MASS INDEX: 22.9 KG/M2 | WEIGHT: 160 LBS | DIASTOLIC BLOOD PRESSURE: 60 MMHG | HEIGHT: 70 IN

## 2018-08-29 DIAGNOSIS — IMO0001 CONTROLLED INSULIN DEPENDENT DIABETES MELLITUS: ICD-10-CM

## 2018-08-29 DIAGNOSIS — R42 DIZZINESS: ICD-10-CM

## 2018-08-29 DIAGNOSIS — M79.10 MUSCLE PAIN: Primary | ICD-10-CM

## 2018-08-29 DIAGNOSIS — E86.0 DEHYDRATION: ICD-10-CM

## 2018-08-29 DIAGNOSIS — E87.2 LACTIC ACIDOSIS: ICD-10-CM

## 2018-08-29 DIAGNOSIS — R50.9 FEVER, UNSPECIFIED FEVER CAUSE: ICD-10-CM

## 2018-08-29 DIAGNOSIS — R79.89 ABNORMAL CBC: ICD-10-CM

## 2018-08-29 PROCEDURE — 99214 OFFICE O/P EST MOD 30 MIN: CPT | Performed by: INTERNAL MEDICINE

## 2018-08-29 NOTE — ASSESSMENT & PLAN NOTE
Patient recently had a short stay in the hospital, admission from the emergency room having difficulties with persistent diarrhea, decreased appetite, lactic acidosis, fever  Patient did have workup and evaluation during hospitalizations which showed no evidence of infection or etiology for increased temperature  Positive findings included a lactic acidosis which improved upon hydration, profound dehydration which improved  Patient was sent home with visiting nurses  He still as of yesterday was having problems with loose stools but as the visiting nurses pointed out he continued to be taking his metformin and this was stopped  Patient presents to the office today still severely ill and debilitated and weak  He states as of now he has no abdominal pain but this is been intermittent  To be complete with exam patient did had a digital rectal examination checking his prostate which is moderately enlarged but nontender and not hot to the touch  Patient will have a repeat CBC, basic metabolic profile and lactic acid performed  He was told the most important consideration at this point time is hydration  His wife will call tomorrow with an update of the patient's situation

## 2018-08-29 NOTE — PROGRESS NOTES
Assessment/Plan:    Dehydration  Patient recently had a short stay in the hospital, admission from the emergency room having difficulties with persistent diarrhea, decreased appetite, lactic acidosis, fever  Patient did have workup and evaluation during hospitalizations which showed no evidence of infection or etiology for increased temperature  Positive findings included a lactic acidosis which improved upon hydration, profound dehydration which improved  Patient was sent home with visiting nurses  He still as of yesterday was having problems with loose stools but as the visiting nurses pointed out he continued to be taking his metformin and this was stopped  Patient presents to the office today still severely ill and debilitated and weak  He states as of now he has no abdominal pain but this is been intermittent  To be complete with exam patient did had a digital rectal examination checking his prostate which is moderately enlarged but nontender and not hot to the touch  Patient will have a repeat CBC, basic metabolic profile and lactic acid performed  He was told the most important consideration at this point time is hydration  His wife will call tomorrow with an update of the patient's situation  Fever  Patient states that since he has left the hospital he still has some chills occasionally but there has been notation of increased temperature readings  Patient is temperature in the office today was normal   Again we will be checking his CBC with differential    Controlled insulin dependent diabetes mellitus ([facility] [address] )  Lab Results   Component Value Date    HGBA1C 6 5 (H) 08/24/2018       No results for input(s): POCGLU in the last 72 hours  Blood Sugar Average: Last 72 hrs:   with this episode patient has little or no appetite  Last hemoglobin A1c was 6 5 showing good control    Again because of his lactic acidosis patient was taken office metformin and his wife and the patient were told that we can make adjustments in his insulin dose to compensate for any elevation in his blood sugar readings  The patient was told this point time because of his weight loss he can be more liberal with his diet   We do want to avoid episodes of hypoglycemia and his wife will be checking sugar twice a day and if needed if the patient feels his sugar is low       Diagnoses and all orders for this visit:    Lactic acidosis  -     Lactic acid, plasma; Future    Dehydration  -     Basic metabolic panel; Future    Abnormal CBC  -     CBC and differential; Future    Fever, unspecified fever cause    Dizziness    Controlled insulin dependent diabetes mellitus (HCC)          Subjective:      Patient ID: Chantal Henriquez  is a 81 y o  male  Patient is an 56-year-old male with a history of multiple medical problems as outlined previously  Patient was seen last week with difficulties with diarrhea, dehydration, fever  Because patient was extremely debilitated and weak he was sent to the emergency room for evaluation  They did a full workup and evaluation including CT T scan of the abdomen and pelvis, chest x-ray, routine lab tests  Patient was noted to be dehydrated and have a lactic acidosis  He was discharged with in approximately 24 hours and sent home  Apparently the patient still extremely weak  Up until yesterday was still having difficulties with diarrhea  He does not have any fevers but still has chills  He has decreased appetite and significant weight loss        The following portions of the patient's history were reviewed and updated as appropriate:   He  has a past medical history of Diabetes mellitus ([address] ) and Hypertension    He   Patient Active Problem List    Diagnosis Date Noted    Dehydration 08/23/2018    Coronary artery disease involving native coronary artery of native heart 08/23/2018    Lactic acidosis 08/23/2018    Fever 08/23/2018    Dizziness 08/23/2018    Arteriosclerotic cardiovascular disease 02/07/2013    Benign essential hypertension 02/07/2013    Controlled insulin dependent diabetes mellitus ([facility] [address] ) 02/07/2013    Enlarged prostate without lower urinary tract symptoms (luts) 02/07/2013    Hyperlipidemia 02/07/2013     He  has a past surgical history that includes Cardiac surgery  His family history is not on file  He  reports that he has quit smoking  His smoking use included Cigarettes  He has never used smokeless tobacco  He reports that he drinks alcohol  He reports that he does not use drugs  Current Outpatient Prescriptions   Medication Sig Dispense Refill    aspirin (ASPIR-81) 81 mg EC tablet Take 1 tablet by mouth daily      insulin detemir (LEVEMIR) 100 units/mL subcutaneous injection Inject 27 Units under the skin daily at bedtime 12units Qam and 15units QHS       lisinopril (ZESTRIL) 10 mg tablet Take 10 mg by mouth daily        simvastatin (ZOCOR) 40 mg tablet Take 40 mg by mouth daily at bedtime        metFORMIN (GLUCOPHAGE) 1000 MG tablet Take 1,000 mg by mouth 2 (two) times a day with meals         No current facility-administered medications for this visit  Current Outpatient Prescriptions on File Prior to Visit   Medication Sig    aspirin (ASPIR-81) 81 mg EC tablet Take 1 tablet by mouth daily    insulin detemir (LEVEMIR) 100 units/mL subcutaneous injection Inject 27 Units under the skin daily at bedtime 12units Qam and 15units QHS     lisinopril (ZESTRIL) 10 mg tablet Take 10 mg by mouth daily      simvastatin (ZOCOR) 40 mg tablet Take 40 mg by mouth daily at bedtime      metFORMIN (GLUCOPHAGE) 1000 MG tablet Take 1,000 mg by mouth 2 (two) times a day with meals       No current facility-administered medications on file prior to visit  He has No Known Allergies       Review of Systems   Constitutional: Positive for activity change (Patient secondary to his profound weakness has limited activity level at this time ), appetite change ( significant decrease in appetite), chills, fatigue and unexpected weight change  Negative for diaphoresis and fever  HENT: Negative  Eyes: Negative  Respiratory: Negative  Cardiovascular: Negative  Gastrointestinal: Positive for diarrhea and nausea  Negative for abdominal distention, abdominal pain, anal bleeding, blood in stool, constipation, rectal pain and vomiting  Endocrine: Negative  Genitourinary: Negative  Musculoskeletal: Positive for arthralgias ( some minor diffuse arthritic aches and pains but nothing disabling)  Negative for back pain, gait problem, joint swelling, myalgias, neck pain and neck stiffness  Skin: Negative  Allergic/Immunologic: Negative  Neurological: Positive for dizziness  Negative for tremors, seizures, syncope, facial asymmetry, speech difficulty, weakness, light-headedness, numbness and headaches  Hematological: Negative  Psychiatric/Behavioral: Negative            Objective:      /60 (BP Location: Left arm, Patient Position: Sitting, Cuff Size: Adult)   Pulse 64   Temp 98 6 °F (37 °C) (Tympanic)   Ht 5' 10" (1 778 m)   Wt 72 6 kg (160 lb)   SpO2 92%   BMI 22 96 kg/m²          Physical Exam

## 2018-08-29 NOTE — ASSESSMENT & PLAN NOTE
Patient states that since he has left the hospital he still has some chills occasionally but there has been notation of increased temperature readings    Patient is temperature in the office today was normal   Again we will be checking his CBC with differential

## 2018-08-29 NOTE — ASSESSMENT & PLAN NOTE
Lab Results   Component Value Date    HGBA1C 6 5 (H) 08/24/2018       No results for input(s): POCGLU in the last 72 hours  Blood Sugar Average: Last 72 hrs:   with this episode patient has little or no appetite  Last hemoglobin A1c was 6 5 showing good control  Again because of his lactic acidosis patient was taken office metformin and his wife and the patient were told that we can make adjustments in his insulin dose to compensate for any elevation in his blood sugar readings  The patient was told this point time because of his weight loss he can be more liberal with his diet     We do want to avoid episodes of hypoglycemia and his wife will be checking sugar twice a day and if needed if the patient feels his sugar is low

## 2018-08-30 ENCOUNTER — TELEPHONE (OUTPATIENT)
Dept: INTERNAL MEDICINE CLINIC | Facility: CLINIC | Age: 83
End: 2018-08-30

## 2018-08-30 NOTE — TELEPHONE ENCOUNTER
Tejas Julian would like a call back to discuss your husbands diabetes,she can be reached at [phone]

## 2018-09-04 ENCOUNTER — TELEPHONE (OUTPATIENT)
Dept: INTERNAL MEDICINE CLINIC | Facility: CLINIC | Age: 83
End: 2018-09-04

## 2018-09-05 ENCOUNTER — TELEPHONE (OUTPATIENT)
Dept: INTERNAL MEDICINE CLINIC | Facility: CLINIC | Age: 83
End: 2018-09-05

## 2018-09-05 DIAGNOSIS — M79.10 MUSCLE PAIN: ICD-10-CM

## 2018-09-05 DIAGNOSIS — E87.2 LACTIC ACIDOSIS: Primary | ICD-10-CM

## 2018-09-05 NOTE — TELEPHONE ENCOUNTER
Spoke with Abdias Fragoso, pt spouse, in regards to the labs/VNA  The pt was not able to get the two labs done because they needed to be done at the hospital  Abdias Fragoso will be try to facilitate transportation and call her insurance to see if the labs are covered if done at [place] [id number]

## 2018-09-05 NOTE — TELEPHONE ENCOUNTER
Kathy Laurent called back to note that she spoke with insurance and they approved the labs  Pt will be getting them done soon

## 2018-09-05 NOTE — TELEPHONE ENCOUNTER
[facility] nurse noted that Pt needs a script for the lactic acid and a lyme titer  Pt uses [facility]

## 2018-09-06 ENCOUNTER — TELEPHONE (OUTPATIENT)
Dept: INTERNAL MEDICINE CLINIC | Facility: CLINIC | Age: 83
End: 2018-09-06

## 2018-09-06 ENCOUNTER — APPOINTMENT (OUTPATIENT)
Dept: LAB | Facility: CLINIC | Age: 83
End: 2018-09-06
Payer: COMMERCIAL

## 2018-09-06 DIAGNOSIS — R79.89 ABNORMAL CBC: ICD-10-CM

## 2018-09-06 DIAGNOSIS — E87.2 LACTIC ACIDOSIS: ICD-10-CM

## 2018-09-06 DIAGNOSIS — M79.10 MUSCLE PAIN: ICD-10-CM

## 2018-09-06 DIAGNOSIS — E86.0 DEHYDRATION: ICD-10-CM

## 2018-09-06 LAB
ANION GAP SERPL CALCULATED.3IONS-SCNC: 8 MMOL/L (ref 4–13)
BASOPHILS # BLD AUTO: 0.05 THOUSANDS/ΜL (ref 0–0.1)
BASOPHILS NFR BLD AUTO: 1 % (ref 0–1)
BUN SERPL-MCNC: 17 MG/DL (ref 5–25)
CALCIUM SERPL-MCNC: 9.5 MG/DL (ref 8.3–10.1)
CHLORIDE SERPL-SCNC: 101 MMOL/L (ref 100–108)
CO2 SERPL-SCNC: 29 MMOL/L (ref 21–32)
CREAT SERPL-MCNC: 1.3 MG/DL (ref 0.6–1.3)
EOSINOPHIL # BLD AUTO: 0.21 THOUSAND/ΜL (ref 0–0.61)
EOSINOPHIL NFR BLD AUTO: 3 % (ref 0–6)
ERYTHROCYTE [DISTWIDTH] IN BLOOD BY AUTOMATED COUNT: 13 % (ref 11.6–15.1)
GFR SERPL CREATININE-BSD FRML MDRD: 51 ML/MIN/1.73SQ M
GLUCOSE SERPL-MCNC: 138 MG/DL (ref 65–140)
HCT VFR BLD AUTO: 42.9 % (ref 36.5–49.3)
HGB BLD-MCNC: 14.7 G/DL (ref 12–17)
IMM GRANULOCYTES # BLD AUTO: 0.04 THOUSAND/UL (ref 0–0.2)
IMM GRANULOCYTES NFR BLD AUTO: 1 % (ref 0–2)
LACTATE SERPL-SCNC: 1.6 MMOL/L (ref 0.5–2)
LYMPHOCYTES # BLD AUTO: 1.6 THOUSANDS/ΜL (ref 0.6–4.47)
LYMPHOCYTES NFR BLD AUTO: 19 % (ref 14–44)
MCH RBC QN AUTO: 30.6 PG (ref 26.8–34.3)
MCHC RBC AUTO-ENTMCNC: 34.3 G/DL (ref 31.4–37.4)
MCV RBC AUTO: 89 FL (ref 82–98)
MONOCYTES # BLD AUTO: 0.55 THOUSAND/ΜL (ref 0.17–1.22)
MONOCYTES NFR BLD AUTO: 7 % (ref 4–12)
NEUTROPHILS # BLD AUTO: 5.87 THOUSANDS/ΜL (ref 1.85–7.62)
NEUTS SEG NFR BLD AUTO: 69 % (ref 43–75)
NRBC BLD AUTO-RTO: 0 /100 WBCS
PLATELET # BLD AUTO: 315 THOUSANDS/UL (ref 149–390)
PMV BLD AUTO: 10.3 FL (ref 8.9–12.7)
POTASSIUM SERPL-SCNC: 4.5 MMOL/L (ref 3.5–5.3)
RBC # BLD AUTO: 4.81 MILLION/UL (ref 3.88–5.62)
SODIUM SERPL-SCNC: 138 MMOL/L (ref 136–145)
WBC # BLD AUTO: 8.32 THOUSAND/UL (ref 4.31–10.16)

## 2018-09-06 PROCEDURE — 86618 LYME DISEASE ANTIBODY: CPT

## 2018-09-06 PROCEDURE — 83605 ASSAY OF LACTIC ACID: CPT

## 2018-09-06 PROCEDURE — 85025 COMPLETE CBC W/AUTO DIFF WBC: CPT

## 2018-09-06 PROCEDURE — 80048 BASIC METABOLIC PNL TOTAL CA: CPT

## 2018-09-06 PROCEDURE — 36415 COLL VENOUS BLD VENIPUNCTURE: CPT

## 2018-09-06 NOTE — TELEPHONE ENCOUNTER
The patients wife called to report patients blood sugar  Last night it was 157 he gave himself 18 units of levemir  This morning Before breakfast 139, he gave himself 15 units of levemir

## 2018-09-07 ENCOUNTER — OFFICE VISIT (OUTPATIENT)
Dept: INTERNAL MEDICINE CLINIC | Facility: CLINIC | Age: 83
End: 2018-09-07
Payer: COMMERCIAL

## 2018-09-07 VITALS
DIASTOLIC BLOOD PRESSURE: 62 MMHG | SYSTOLIC BLOOD PRESSURE: 102 MMHG | OXYGEN SATURATION: 96 % | BODY MASS INDEX: 22.76 KG/M2 | TEMPERATURE: 97.5 F | HEIGHT: 70 IN | WEIGHT: 159 LBS | HEART RATE: 59 BPM

## 2018-09-07 DIAGNOSIS — I10 BENIGN ESSENTIAL HYPERTENSION: ICD-10-CM

## 2018-09-07 DIAGNOSIS — E86.0 DEHYDRATION: ICD-10-CM

## 2018-09-07 DIAGNOSIS — R50.9 FEVER, UNSPECIFIED FEVER CAUSE: ICD-10-CM

## 2018-09-07 DIAGNOSIS — IMO0001 CONTROLLED INSULIN DEPENDENT DIABETES MELLITUS: ICD-10-CM

## 2018-09-07 DIAGNOSIS — E87.2 LACTIC ACIDOSIS: Primary | ICD-10-CM

## 2018-09-07 LAB
B BURGDOR IGG SER IA-ACNC: 0.46
B BURGDOR IGM SER IA-ACNC: 0.55

## 2018-09-07 PROCEDURE — 99214 OFFICE O/P EST MOD 30 MIN: CPT | Performed by: INTERNAL MEDICINE

## 2018-09-07 PROCEDURE — 3008F BODY MASS INDEX DOCD: CPT | Performed by: INTERNAL MEDICINE

## 2018-09-07 NOTE — PROGRESS NOTES
Assessment/Plan:    Lactic acidosis  Patient was recently seen for an acute illness, hospitalization for fever, chills, generalized fatigue, decreased appetite, dehydration  Patient while hospitalized was diagnosed with a lactic acidosis  Because the patient was on metformin this was stopped the patient is watching his sugar closely and making adjustments to his insulin dose in order to control his blood sugars  Recent testing shows resolution of his lactic acidosis but patient still is having some generalized muscular aches and pains  Although the patient did not experience a tick bite we did send him for Lyme disease titer but we do not have the results as of yet  Patient is trying to keep himself hydrated and is slowly increasing his appetite and p o  intake  He still has some generalized weakness which again is slowly improving  He does have a follow-up visit to be seen by his endocrinologist on Monday and hopefully they will agree for with a stopping his metformin and he then can make adjustments to his insulin dose in order to compensate for the lack of this medication  We will call the patient immediately once we receive the results of his Lyme disease titer and initiate treatment if necessary  His wife will be calling us after the patient is seen in the office with the endocrinologist on Monday    Benign essential hypertension  Patient has a history of hypertension but is on no medications at this point time other than a low dose of lisinopril  His blood pressure is stable and patient denies any lightheadedness or dizziness  His wife is checking his blood pressure on a regular basis at home and will report both if the patient has hypotension and/or problems with lightheadedness, dizziness    Controlled insulin dependent diabetes mellitus ([facility] )  Lab Results   Component Value Date    HGBA1C 6 5 (H) 08/24/2018       No results for input(s): POCGLU in the last 72 hours      Blood Sugar Average: Last 72 hrs:   again as noted patient was taken office metformin and he is checking his sugar twice a day and making adjustments to his insulin dose which is been appropriate  Patient has had no hypoglycemia and his blood sugars on average are running approximately 150  Diagnoses and all orders for this visit:    Lactic acidosis    Fever, unspecified fever cause    Dehydration    Benign essential hypertension    Controlled insulin dependent diabetes mellitus ([address] )    Other orders  -     ONE TOUCH ULTRA TEST test strip;           Subjective:      Patient ID: Mar Torres  is a 81 y o  male  Patient is an 44-year-old male with a history of hypertension, diabetes mellitus type 2 insulin dependent, recent acute difficulties with lactic acidosis, dehydration, weight loss and decreased appetite  Patient is here for follow-up and states overall he is slowly improving        The following portions of the patient's history were reviewed and updated as appropriate:   He  has a past medical history of Diabetes mellitus ([address] ) and Hypertension  He   Patient Active Problem List    Diagnosis Date Noted    Dehydration 08/23/2018    Coronary artery disease involving native coronary artery of native heart 08/23/2018    Lactic acidosis 08/23/2018    Fever 08/23/2018    Dizziness 08/23/2018    Arteriosclerotic cardiovascular disease 02/07/2013    Benign essential hypertension 02/07/2013    Controlled insulin dependent diabetes mellitus ([address] ) 02/07/2013    Enlarged prostate without lower urinary tract symptoms (luts) 02/07/2013    Hyperlipidemia 02/07/2013     He  has a past surgical history that includes Cardiac surgery  His family history is not on file  He  reports that he has quit smoking  His smoking use included Cigarettes  He has never used smokeless tobacco  He reports that he drinks alcohol  He reports that he does not use drugs    Current Outpatient Prescriptions   Medication Sig Dispense Refill    aspirin (ASPIR-81) 81 mg EC tablet Take 1 tablet by mouth daily      insulin detemir (LEVEMIR) 100 units/mL subcutaneous injection Inject 27 Units under the skin daily at bedtime 12units Qam and 15units QHS       lisinopril (ZESTRIL) 10 mg tablet Take 10 mg by mouth daily        ONE TOUCH ULTRA TEST test strip       simvastatin (ZOCOR) 40 mg tablet Take 40 mg by mouth daily at bedtime         No current facility-administered medications for this visit  Current Outpatient Prescriptions on File Prior to Visit   Medication Sig    aspirin (ASPIR-81) 81 mg EC tablet Take 1 tablet by mouth daily    insulin detemir (LEVEMIR) 100 units/mL subcutaneous injection Inject 27 Units under the skin daily at bedtime 12units Qam and 15units QHS     lisinopril (ZESTRIL) 10 mg tablet Take 10 mg by mouth daily      simvastatin (ZOCOR) 40 mg tablet Take 40 mg by mouth daily at bedtime      [DISCONTINUED] metFORMIN (GLUCOPHAGE) 1000 MG tablet Take 1,000 mg by mouth 2 (two) times a day with meals       No current facility-administered medications on file prior to visit  He has No Known Allergies       Review of Systems   Constitutional: Positive for activity change (Patient has had some restriction in his activity level secondary to diffuse weakness  We are waiting the results of the Lyme disease titer), appetite change and fatigue  Negative for chills, diaphoresis, fever and unexpected weight change  HENT: Negative  Eyes: Negative  Respiratory: Negative  Cardiovascular: Negative  Gastrointestinal: Negative  Endocrine: Negative  Genitourinary: Negative  Musculoskeletal: Positive for myalgias  Negative for arthralgias, back pain, gait problem, joint swelling, neck pain and neck stiffness  Skin: Negative  Allergic/Immunologic: Negative  Neurological: Positive for weakness   Negative for dizziness, tremors, seizures, syncope, facial asymmetry, speech difficulty, light-headedness, numbness and headaches  Hematological: Negative  Psychiatric/Behavioral: Negative  Objective:      /62   Pulse 59   Temp 97 5 °F (36 4 °C)   Ht 5' 10" (1 778 m)   Wt 72 1 kg (159 lb)   SpO2 96%   BMI 22 81 kg/m²          Physical Exam   Constitutional: He is oriented to person, place, and time  He appears well-developed  No distress  Pleasant, thin, frail-appearing 50-year-old male who is awake alert, walking with a walker for stability   HENT:   Head: Normocephalic and atraumatic  Right Ear: External ear normal    Left Ear: External ear normal    Nose: Nose normal    Mouth/Throat: Oropharynx is clear and moist  No oropharyngeal exudate  Eyes: Conjunctivae and EOM are normal  Pupils are equal, round, and reactive to light  Right eye exhibits no discharge  Left eye exhibits no discharge  No scleral icterus  Neck: Normal range of motion  Neck supple  No JVD present  No tracheal deviation present  No thyromegaly present  Cardiovascular: Normal rate, regular rhythm, normal heart sounds and intact distal pulses  Exam reveals no gallop and no friction rub  No murmur heard  Pulmonary/Chest: Effort normal and breath sounds normal  No stridor  No respiratory distress  He has no wheezes  He has no rales  He exhibits no tenderness  Abdominal: Soft  Bowel sounds are normal  He exhibits no distension and no mass  There is no tenderness  There is no rebound and no guarding  Musculoskeletal: He exhibits tenderness (Generalized muscle tenderness to both upper lower extremities with palpation) and deformity  He exhibits no edema  Lymphadenopathy:     He has no cervical adenopathy  Neurological: He is alert and oriented to person, place, and time  He displays abnormal reflex  No cranial nerve deficit  He exhibits abnormal muscle tone  Coordination (Some generalized weakness with difficulty with gait walking with a walker) abnormal    Skin: Skin is warm and dry  No rash noted  He is not diaphoretic  No erythema  No pallor  Psychiatric: He has a normal mood and affect  His behavior is normal  Judgment and thought content normal    Nursing note and vitals reviewed

## 2018-09-07 NOTE — ASSESSMENT & PLAN NOTE
Patient has a history of hypertension but is on no medications at this point time other than a low dose of lisinopril  His blood pressure is stable and patient denies any lightheadedness or dizziness    His wife is checking his blood pressure on a regular basis at home and will report both if the patient has hypotension and/or problems with lightheadedness, dizziness

## 2018-09-07 NOTE — ASSESSMENT & PLAN NOTE
Lab Results   Component Value Date    HGBA1C 6 5 (H) 08/24/2018       No results for input(s): POCGLU in the last 72 hours  Blood Sugar Average: Last 72 hrs:   again as noted patient was taken office metformin and he is checking his sugar twice a day and making adjustments to his insulin dose which is been appropriate  Patient has had no hypoglycemia and his blood sugars on average are running approximately 150

## 2018-09-07 NOTE — ASSESSMENT & PLAN NOTE
Patient was recently seen for an acute illness, hospitalization for fever, chills, generalized fatigue, decreased appetite, dehydration  Patient while hospitalized was diagnosed with a lactic acidosis  Because the patient was on metformin this was stopped the patient is watching his sugar closely and making adjustments to his insulin dose in order to control his blood sugars  Recent testing shows resolution of his lactic acidosis but patient still is having some generalized muscular aches and pains  Although the patient did not experience a tick bite we did send him for Lyme disease titer but we do not have the results as of yet  Patient is trying to keep himself hydrated and is slowly increasing his appetite and p o  intake  He still has some generalized weakness which again is slowly improving  He does have a follow-up visit to be seen by his endocrinologist on Monday and hopefully they will agree for with a stopping his metformin and he then can make adjustments to his insulin dose in order to compensate for the lack of this medication  We will call the patient immediately once we receive the results of his Lyme disease titer and initiate treatment if necessary    His wife will be calling us after the patient is seen in the office with the endocrinologist on Monday

## 2018-09-13 LAB
LEFT EYE DIABETIC RETINOPATHY: NORMAL
RIGHT EYE DIABETIC RETINOPATHY: NORMAL

## 2018-09-14 ENCOUNTER — TELEPHONE (OUTPATIENT)
Dept: INTERNAL MEDICINE CLINIC | Facility: CLINIC | Age: 83
End: 2018-09-14

## 2018-09-14 NOTE — TELEPHONE ENCOUNTER
Corrinne Ogle asked for a call back with results of Pt lyme test  She can be reached at [phone]

## 2018-10-01 ENCOUNTER — OFFICE VISIT (OUTPATIENT)
Dept: INTERNAL MEDICINE CLINIC | Facility: CLINIC | Age: 83
End: 2018-10-01
Payer: COMMERCIAL

## 2018-10-01 DIAGNOSIS — Z01.818 PREOP GENERAL PHYSICAL EXAM: Primary | ICD-10-CM

## 2018-10-01 DIAGNOSIS — I10 BENIGN ESSENTIAL HYPERTENSION: ICD-10-CM

## 2018-10-01 DIAGNOSIS — H25.89 OTHER AGE-RELATED CATARACT OF RIGHT EYE: ICD-10-CM

## 2018-10-01 DIAGNOSIS — E87.2 LACTIC ACIDOSIS: ICD-10-CM

## 2018-10-01 PROCEDURE — 93000 ELECTROCARDIOGRAM COMPLETE: CPT | Performed by: INTERNAL MEDICINE

## 2018-10-01 PROCEDURE — 99214 OFFICE O/P EST MOD 30 MIN: CPT | Performed by: INTERNAL MEDICINE

## 2018-10-01 NOTE — PROGRESS NOTES
Assessment/Plan:    Other age-related cataract  Patient is a 41-year-old male with a history of multiple medical problems as outlined previously  Patient is here today for preop evaluation prior to having cataract surgery  Patient is scheduled for cataract surgery on the right although he does have bilateral cataracts  The patient had no pre-admission testing other than an EKG in the office today which shows sinus bradycardia but no acute ischemia  Patient underwent physical examination is cleared for the procedure as presented    Lactic acidosis  Patient had an episode of severe lactic acidosis and illness acutely  Patient did have profound loss of appetite  The decision was made to stop his metformin and he states his appetite is now back to normal, his endocrinologist agrees with treatment  In regarding history patient states that he was working extremely hard outside prior to this episode in the hot sun with inadequate hydration which may be the determining factor as far as patient presenting with a lactic acidosis  Patient was told recurrence of symptoms or problems to please call  His endocrinologist states that possibly in the future patient will go back on the metformin    Benign essential hypertension  Patient has a longstanding history of hypertension  Patient is on ACE-inhibitor for this and his diabetes  Patient's blood pressure is well controlled  Patient will continue present treatment  Diagnoses and all orders for this visit:    Preop general physical exam  -     POCT ECG    Other age-related cataract of right eye    Lactic acidosis    Benign essential hypertension          Subjective:      Patient ID: Nate Hanson  is a 81 y o  male  Patient is an 41-year-old male with a history of coronary artery disease with stent placement in the past, hypertension, diabetes mellitus type 2, BPH    Patient is here today for preop medical evaluation prior to undergoing cataract surgery on the right  Patient states that he has had problems with decreased visual acuity  He was also told that he has astigmatism  Patient has no questions about the upcoming procedure        The following portions of the patient's history were reviewed and updated as appropriate:   He  has a past medical history of Diabetes mellitus ([facility] ) and Hypertension  He   Patient Active Problem List    Diagnosis Date Noted    Other age-related cataract 10/01/2018    Dehydration 08/23/2018    Coronary artery disease involving native coronary artery of native heart 08/23/2018    Lactic acidosis 08/23/2018    Fever 08/23/2018    Dizziness 08/23/2018    Arteriosclerotic cardiovascular disease 02/07/2013    Benign essential hypertension 02/07/2013    Controlled insulin dependent diabetes mellitus ([facility] ) 02/07/2013    Enlarged prostate without lower urinary tract symptoms (luts) 02/07/2013    Hyperlipidemia 02/07/2013     He  has a past surgical history that includes Cardiac surgery  His family history is not on file  He  reports that he has quit smoking  His smoking use included Cigarettes  He has never used smokeless tobacco  He reports that he drinks alcohol  He reports that he does not use drugs  Current Outpatient Prescriptions   Medication Sig Dispense Refill    aspirin (ASPIR-81) 81 mg EC tablet Take 1 tablet by mouth daily      insulin detemir (LEVEMIR) 100 units/mL subcutaneous injection Inject 27 Units under the skin daily at bedtime 12units Qam and 15units QHS       lisinopril (ZESTRIL) 10 mg tablet Take 10 mg by mouth daily        ONE TOUCH ULTRA TEST test strip       simvastatin (ZOCOR) 40 mg tablet Take 40 mg by mouth daily at bedtime         No current facility-administered medications for this visit        Current Outpatient Prescriptions on File Prior to Visit   Medication Sig    aspirin (ASPIR-81) 81 mg EC tablet Take 1 tablet by mouth daily    insulin detemir (LEVEMIR) 100 units/mL subcutaneous injection Inject 27 Units under the skin daily at bedtime 12units Qam and 15units QHS     lisinopril (ZESTRIL) 10 mg tablet Take 10 mg by mouth daily      ONE TOUCH ULTRA TEST test strip     simvastatin (ZOCOR) 40 mg tablet Take 40 mg by mouth daily at bedtime       No current facility-administered medications on file prior to visit  He has No Known Allergies       Review of Systems   Constitutional: Positive for activity change (Patient states he has slowly increasing his activity level since recent illness) and appetite change ( improvement in his appetite, tolerating food products without problems)  Negative for chills, diaphoresis, fatigue, fever and unexpected weight change  HENT: Negative  Eyes: Positive for visual disturbance  Negative for photophobia, pain, discharge, redness and itching  Respiratory: Negative  Cardiovascular: Negative  Gastrointestinal: Negative  Endocrine: Negative  Genitourinary: Negative  Musculoskeletal: Positive for arthralgias ( some generalized arthritic aches and pains but nothing new or problematic at this time)  Negative for back pain, gait problem, joint swelling, myalgias, neck pain and neck stiffness  Skin: Negative  Allergic/Immunologic: Negative  Neurological: Positive for weakness (Patient has had almost complete resolution of his weakness)  Negative for dizziness, tremors, seizures, syncope, facial asymmetry, speech difficulty, light-headedness, numbness and headaches  Hematological: Negative  Psychiatric/Behavioral: Negative  Objective: There were no vitals taken for this visit  Physical Exam   Constitutional: He is oriented to person, place, and time  He appears well-developed and well-nourished  No distress  Pleasant, cheerful, thin 72-year-old male who is awake alert in no acute distress and oriented x3   HENT:   Head: Normocephalic and atraumatic     Right Ear: External ear normal    Left Ear: External ear normal    Nose: Nose normal    Mouth/Throat: Oropharynx is clear and moist  No oropharyngeal exudate  Eyes: Pupils are equal, round, and reactive to light  Conjunctivae and EOM are normal  Right eye exhibits no discharge  Left eye exhibits no discharge  No scleral icterus  Bilateral cataracts   Neck: Normal range of motion  Neck supple  No JVD present  No tracheal deviation present  No thyromegaly present  Cardiovascular: Regular rhythm, normal heart sounds and intact distal pulses  Exam reveals no gallop and no friction rub  No murmur heard  Patient has a regular rhythm, bradycardia with no ectopy   Pulmonary/Chest: Effort normal and breath sounds normal  No stridor  No respiratory distress  He has no wheezes  He has no rales  He exhibits no tenderness  Abdominal: Soft  Bowel sounds are normal  He exhibits no distension and no mass  There is no tenderness  There is no rebound and no guarding  Musculoskeletal: He exhibits deformity (Some mild diffuse arthritic changes but nothing acute)  He exhibits no edema or tenderness  Lymphadenopathy:     He has no cervical adenopathy  Neurological: He is alert and oriented to person, place, and time  He displays abnormal reflex  He exhibits normal muscle tone  Coordination normal    Patient does have 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

## 2018-10-01 NOTE — ASSESSMENT & PLAN NOTE
Patient has a longstanding history of hypertension  Patient is on ACE-inhibitor for this and his diabetes  Patient's blood pressure is well controlled  Patient will continue present treatment

## 2018-10-01 NOTE — ASSESSMENT & PLAN NOTE
Patient is a 73-year-old male with a history of multiple medical problems as outlined previously  Patient is here today for preop evaluation prior to having cataract surgery  Patient is scheduled for cataract surgery on the right although he does have bilateral cataracts  The patient had no pre-admission testing other than an EKG in the office today which shows sinus bradycardia but no acute ischemia    Patient underwent physical examination is cleared for the procedure as presented

## 2018-10-01 NOTE — ASSESSMENT & PLAN NOTE
Patient had an episode of severe lactic acidosis and illness acutely  Patient did have profound loss of appetite  The decision was made to stop his metformin and he states his appetite is now back to normal, his endocrinologist agrees with treatment  In regarding history patient states that he was working extremely hard outside prior to this episode in the hot sun with inadequate hydration which may be the determining factor as far as patient presenting with a lactic acidosis  Patient was told recurrence of symptoms or problems to please call    His endocrinologist states that possibly in the future patient will go back on the metformin

## 2018-10-04 ENCOUNTER — OFFICE VISIT (OUTPATIENT)
Dept: INTERNAL MEDICINE CLINIC | Facility: CLINIC | Age: 83
End: 2018-10-04
Payer: COMMERCIAL

## 2018-10-04 VITALS
WEIGHT: 166 LBS | HEIGHT: 70 IN | DIASTOLIC BLOOD PRESSURE: 60 MMHG | SYSTOLIC BLOOD PRESSURE: 118 MMHG | BODY MASS INDEX: 23.77 KG/M2 | TEMPERATURE: 97 F | HEART RATE: 61 BPM | OXYGEN SATURATION: 98 %

## 2018-10-04 DIAGNOSIS — E86.0 DEHYDRATION: ICD-10-CM

## 2018-10-04 DIAGNOSIS — E78.00 PURE HYPERCHOLESTEROLEMIA: ICD-10-CM

## 2018-10-04 DIAGNOSIS — I25.10 ARTERIOSCLEROTIC CARDIOVASCULAR DISEASE: ICD-10-CM

## 2018-10-04 DIAGNOSIS — I10 BENIGN ESSENTIAL HYPERTENSION: ICD-10-CM

## 2018-10-04 DIAGNOSIS — IMO0001 CONTROLLED INSULIN DEPENDENT DIABETES MELLITUS: Primary | ICD-10-CM

## 2018-10-04 DIAGNOSIS — R42 DIZZINESS: ICD-10-CM

## 2018-10-04 DIAGNOSIS — R50.9 FEVER, UNSPECIFIED FEVER CAUSE: ICD-10-CM

## 2018-10-04 DIAGNOSIS — H25.89 OTHER AGE-RELATED CATARACT OF RIGHT EYE: ICD-10-CM

## 2018-10-04 DIAGNOSIS — N40.0 ENLARGED PROSTATE WITHOUT LOWER URINARY TRACT SYMPTOMS (LUTS): ICD-10-CM

## 2018-10-04 DIAGNOSIS — E87.2 LACTIC ACIDOSIS: ICD-10-CM

## 2018-10-04 DIAGNOSIS — I25.10 CORONARY ARTERY DISEASE INVOLVING NATIVE CORONARY ARTERY OF NATIVE HEART WITHOUT ANGINA PECTORIS: ICD-10-CM

## 2018-10-04 DIAGNOSIS — Z00.00 HEALTHCARE MAINTENANCE: ICD-10-CM

## 2018-10-04 PROCEDURE — 1036F TOBACCO NON-USER: CPT | Performed by: INTERNAL MEDICINE

## 2018-10-04 PROCEDURE — 1160F RVW MEDS BY RX/DR IN RCRD: CPT | Performed by: INTERNAL MEDICINE

## 2018-10-04 PROCEDURE — 1170F FXNL STATUS ASSESSED: CPT | Performed by: INTERNAL MEDICINE

## 2018-10-04 PROCEDURE — 1125F AMNT PAIN NOTED PAIN PRSNT: CPT | Performed by: INTERNAL MEDICINE

## 2018-10-04 PROCEDURE — 3074F SYST BP LT 130 MM HG: CPT | Performed by: INTERNAL MEDICINE

## 2018-10-04 PROCEDURE — 3078F DIAST BP <80 MM HG: CPT | Performed by: INTERNAL MEDICINE

## 2018-10-04 PROCEDURE — G0439 PPPS, SUBSEQ VISIT: HCPCS | Performed by: INTERNAL MEDICINE

## 2018-10-04 RX ORDER — BESIFLOXACIN 6 MG/ML
SUSPENSION OPHTHALMIC
COMMUNITY
Start: 2018-09-21 | End: 2019-04-04 | Stop reason: ALTCHOICE

## 2018-10-04 RX ORDER — BROMFENAC SODIUM 0.7 MG/ML
SOLUTION/ DROPS OPHTHALMIC
COMMUNITY
Start: 2018-09-21 | End: 2019-04-04 | Stop reason: ALTCHOICE

## 2018-10-04 RX ORDER — PREDNISOLONE ACETATE 10 MG/ML
SUSPENSION/ DROPS OPHTHALMIC
COMMUNITY
Start: 2018-09-21 | End: 2019-04-04 | Stop reason: ALTCHOICE

## 2018-10-04 NOTE — PROGRESS NOTES
Assessment and Plan:  Problem List Items Addressed This Visit     None        Health Maintenance Due   Topic Date Due    Depression Screening PHQ  1935    Diabetic Foot Exam  01/20/1946    URINE MICROALBUMIN  01/20/1946    DTaP,Tdap,and Td Vaccines (1 - Tdap) 01/20/1957    Fall Risk  01/20/2001    Pneumococcal PPSV23/PCV13 65+ Years / Low and Medium Risk (1 of 2 - PCV13) 01/20/2001    INFLUENZA VACCINE  09/01/2018         HPI:  Patient Active Problem List   Diagnosis    Arteriosclerotic cardiovascular disease    Benign essential hypertension    Controlled insulin dependent diabetes mellitus ([facility] )    Enlarged prostate without lower urinary tract symptoms (luts)    Hyperlipidemia    Dehydration    Coronary artery disease involving native coronary artery of native heart    Lactic acidosis    Fever    Dizziness    Other age-related cataract     Past Medical History:   Diagnosis Date    Diabetes mellitus ([facility] )     Hypertension      Past Surgical History:   Procedure Laterality Date    CARDIAC SURGERY       No family history on file  History   Smoking Status    Former Smoker    Types: Cigarettes   Smokeless Tobacco    Never Used     History   Alcohol Use    Yes     Comment: sparingly      History   Drug Use No         Current Outpatient Prescriptions   Medication Sig Dispense Refill    aspirin (ASPIR-81) 81 mg EC tablet Take 1 tablet by mouth daily      BESIVANCE 0 6 % SUSP       insulin detemir (LEVEMIR) 100 units/mL subcutaneous injection Inject 27 Units under the skin daily at bedtime 12units Qam and 15units QHS       lisinopril (ZESTRIL) 10 mg tablet Take 10 mg by mouth daily        ONE TOUCH ULTRA TEST test strip       prednisoLONE acetate (PRED FORTE) 1 % ophthalmic suspension       PROLENSA 0 07 % SOLN       simvastatin (ZOCOR) 40 mg tablet Take 40 mg by mouth daily at bedtime         No current facility-administered medications for this visit        No Known Allergies    There is no immunization history on file for this patient  Patient Care Team:  Elaine Powers DO as PCP - General    Medicare Screening Tests and Risk Assessments:  Rya Castillo is here for his Subsequent Wellness visit  Health Risk Assessment:  Patient rates overall health as good  Patient feels that their physical health rating is Slightly worse  Eyesight was rated as Slightly worse  Hearing was rated as Same  Patient feels that their emotional and mental health rating is Same  Pain experienced by patient in the last 7 days has been Some  Patient's pain rating has been 4/10  Emotional/Mental Health:    PHQ-9 Depression Screening:    Frequency of the following problems over the past two weeks:      1  Little interest or pleasure in doing things: 0 - not at all      2  Feeling down, depressed, or hopeless: 0 - not at all  PHQ-2 Score: 0          Broken Bones/Falls: Fall Risk Assessment:    In the past year, patient has experienced: No history of falling in past year          Bladder/Bowel:  Patient has not leaked urine accidently in the last six months  Patient reports no loss of bowel control  Immunizations:  Patient has not had a flu vaccination within the last year  Patient has not received a pneumonia shot  Patient has not received a shingles shot  Home Safety:  Patient does not have trouble with stairs inside or outside of their home  Patient currently reports that there are no safety hazards present in home, working smoke alarms,     Preventative Screenings:   prostate cancer screen performed, cholesterol screen completed, glaucoma eye exam completed,     Nutrition:  Current diet: Regular with servings of the following:    Medications:  Patient is not currently taking any over-the-counter supplements  Patient is able to manage medications  Lifestyle Choices:  Patient reports no tobacco use    Patient has smoked or used tobacco in the past   Patient has stopped his tobacco use  Patient reports alcohol use  Patient drives a vehicle  Patient wears seat belt  Activities of Daily Living:  Can get out of bed by his or her self, able to dress self, able to make own meals, able to do own shopping, able to bathe self, can do own laundry/housekeeping, can manage own money, pay bills and track expenses    Previous Hospitalizations:  Hospitalization or ED visit in past 12 months  Number of hospitalizations within the last year: 1-2        Advanced Directives:  Patient has not decided on power of   Patient has completed advanced directive  Preventative Screening/Counseling:      Cardiovascular:      General: Risks and Benefits Discussed and Screening Current      Counseling: Healthy Diet, Healthy Weight, Improve Cholesterol, Improve Blood Pressure and Improve Exercise Tolerance          Colorectal Cancer:      General: Risks and Benefits Discussed and Screening Current          Prostate Cancer:      General: Risks and Benefits Discussed and Screening Current          Osteoporosis:      General: Risks and Benefits Discussed          AAA:      General: Screening Not Indicated          Glaucoma:      General: Screening Current          HIV:      General: Screening Not Indicated          Hepatitis C:      General: Screening Not Indicated        Advanced Directives:   Patient has living will for healthcare, has durable POA for healthcare, patient has an advanced directive  Information on ACP and/or AD provided  No 5 wishes given  End of life assessment reviewed with patient  Provider agrees with end of life decisions        Immunizations:      Influenza: Patient Declines      Pneumococcal: Patient Declines      Shingrix: Patient Declines      Hepatitis B (Low risk patients): Series Not Indicated      Hepatitis B (Medium to high risk patients): Patient Declines      Zostavax: Patient Declines      TD: Patient Declines      TDAP: Patient Declines      Other Preventative Counseling (Non-Medicare):  Alcohol Use, Fall Prevention, Helmet Safety, Increase physical activity, Nutrition Counseling, Car/seat belt/driving safety reviewed, Skin self-exam and Sunscreen use          No exam data present    Physical Exam:  Review of Systems   Constitutional: Negative  HENT: Negative  Eyes: Negative  Respiratory: Negative  Cardiovascular: Negative  Gastrointestinal: Negative  Negative for bowel incontinence  Endocrine: Negative  Genitourinary: Positive for difficulty urinating  Negative for decreased urine volume, discharge, dysuria, enuresis, flank pain, frequency, genital sores, hematuria, penile pain, penile swelling, scrotal swelling, testicular pain and urgency  Musculoskeletal: Positive for arthralgias  Negative for back pain, gait problem, joint swelling, myalgias, neck pain and neck stiffness  Skin: Negative for color change, pallor and rash  Allergic/Immunologic: Negative  Neurological: Negative  Hematological: Negative  Psychiatric/Behavioral: Negative  Vitals:    10/04/18 0900   BP: 118/60   Pulse: 61   Temp: (!) 97 °F (36 1 °C)   SpO2: 98%   Weight: 75 3 kg (166 lb)   Height: 5' 10" (1 778 m)   Body mass index is 23 82 kg/m²  Physical Exam   Constitutional: He is oriented to person, place, and time  He appears well-developed and well-nourished  No distress  Pleasant, cheerful 81-year-old male who is awake alert no acute distress and oriented x3   HENT:   Head: Normocephalic and atraumatic  Right Ear: External ear normal    Left Ear: External ear normal    Nose: Nose normal    Mouth/Throat: Oropharynx is clear and moist  No oropharyngeal exudate  Eyes: Pupils are equal, round, and reactive to light  Conjunctivae and EOM are normal  Right eye exhibits no discharge  Left eye exhibits no discharge  No scleral icterus  Neck: Normal range of motion  Neck supple  No JVD present  No tracheal deviation present  No thyromegaly present  Cardiovascular: Normal rate, regular rhythm, normal heart sounds and intact distal pulses  Exam reveals no gallop and no friction rub  No murmur heard  Pulmonary/Chest: Effort normal and breath sounds normal  No stridor  No respiratory distress  He has no wheezes  He has no rales  He exhibits no tenderness  Abdominal: Soft  Bowel sounds are normal  He exhibits no distension and no mass  There is no tenderness  There is no rebound and no guarding  No hernia  Genitourinary:   Genitourinary Comments: Patient defers examination stating he sees his urologist and they do check his prostate and PSA  He refuses to do stool testing   Musculoskeletal: He exhibits deformity  He exhibits no edema or tenderness  Patient on examination has diffuse arthritic changes to the hands and digits, notable deformity to his left ankle from previous fracture, flattening to his lumbar curve with some decreased range of motion throughout his lumbar spine both passively and actively but no pain with movement   Lymphadenopathy:     He has no cervical adenopathy  Neurological: He is alert and oriented to person, place, and time  He displays normal reflexes  No cranial nerve deficit or sensory deficit  He exhibits normal muscle tone  Coordination normal    Skin: Skin is warm  Capillary refill takes less than 2 seconds  No rash noted  He is not diaphoretic  No erythema  No pallor  Psychiatric: He has a normal mood and affect  His behavior is normal  Judgment and thought content normal    Nursing note and vitals reviewed

## 2018-10-04 NOTE — ASSESSMENT & PLAN NOTE
Patient's cholesterol is controlled  Patient continues to follow-up with his cardiologist   He denies any chest pain or pressure and no increasing shortness of breath  Patient knows to call immediately if any cardiac symptoms such as chest pain, increasing shortness of breath with exertion, increased swelling of the feet and ankles

## 2018-10-04 NOTE — ASSESSMENT & PLAN NOTE
Patient's blood pressure is controlled and he continues to have this monitored in our office with his cardiologist, urologist and endocrinologist   Patient is appropriately on a low dose of Zestril    Patient's renal function is stable

## 2018-10-04 NOTE — PROGRESS NOTES
Diabetic Foot Exam    Patient's shoes and socks removed  Right Foot/Ankle   Right Foot Inspection  Skin Exam: skin normal and skin intact no dry skin, no warmth, no callus, no erythema, no maceration, no abnormal color, no pre-ulcer, no ulcer and no callus                          Toe Exam: right toe deformity (Some arthritic changes to all digits but nothing acute)no swelling, no tenderness and erythema  Sensory   Vibration: intact  Proprioception: intact   Monofilament testing: intact  Vascular  Capillary refills: < 3 seconds  The right DP pulse is 1+  The right PT pulse is 1+  Left Foot/Ankle  Left Foot Inspection  Skin Exam: skin normal and skin intactno dry skin, no warmth, no erythema, no maceration, normal color, no pre-ulcer, no ulcer and no callus                         Toe Exam: ROM and strength within normal limits and left toe deformityno swelling, no tenderness and no erythema                   Sensory   Vibration: intact  Proprioception: intact  Monofilament: intact  Vascular  Capillary refills: < 3 seconds  The left DP pulse is 1+  The left PT pulse is 1+  Assign Risk Category:  Deformity present;  No loss of protective sensation; Weak pulses       Risk: 1

## 2018-10-04 NOTE — ASSESSMENT & PLAN NOTE
Lab Results   Component Value Date    HGBA1C 6 5 (H) 08/24/2018       No results for input(s): POCGLU in the last 72 hours  Blood Sugar Average: Last 72 hrs:   since the patient has been off of metformin there has been some elevation in his blood sugar readings at home  Patient has an upcoming visit to be seen by his endocrinologist this week  They will continue to monitor his blood sugar and patient has been extremely compliant  He has made notable adjustments in his insulin injections in order to cover his elevated blood sugar readings  Diabetic foot exam was performed today    Patient has no circulatory difficulties and no evidence of neuropathic

## 2018-10-04 NOTE — ASSESSMENT & PLAN NOTE
Patient is here today for routine Medicare wellness visit  Patient is been seen frequently over the past few months with episode of severe lactic acidosis brought on with dehydration and being on metformin  Patient is now recovered fully  Patient did have labs performed prior to being seen and we did discuss the results which were all essentially normal in including control of his blood sugars, cholesterol  He continues to follow-up with his urologist for prostate screening  He is up-to-date with colon rectal screening but he was told it is age any further evaluations  would depend on any abnormalities having with his bowels    Patient did have a complete physical today but refuses rectal exam colon rectal screening

## 2018-10-04 NOTE — ASSESSMENT & PLAN NOTE
Patient has a history of BPH  He continues to follow-up with his urologist in the been checking his PSA readings  Patient does have some difficulties with flow but no increased problems recently

## 2018-10-04 NOTE — ASSESSMENT & PLAN NOTE
Patient had a repeat lactic acid level drawn which has been normal   Patient is keeping himself well hydrated which is a complication that brought on this particular abnormality  Patient again is off of his metformin and his endocrinologist states that he makes restart this in the future  Patient's appetite is improving and he is gaining weight    Were pleased with the improvement

## 2018-10-16 ENCOUNTER — TRANSCRIBE ORDERS (OUTPATIENT)
Dept: ADMINISTRATIVE | Facility: HOSPITAL | Age: 83
End: 2018-10-16

## 2018-11-27 ENCOUNTER — TRANSCRIBE ORDERS (OUTPATIENT)
Dept: ADMINISTRATIVE | Facility: HOSPITAL | Age: 83
End: 2018-11-27

## 2018-11-27 DIAGNOSIS — I72.8 SPLENIC ARTERY ANEURYSM (HCC): Primary | ICD-10-CM

## 2018-12-07 ENCOUNTER — HOSPITAL ENCOUNTER (OUTPATIENT)
Dept: NON INVASIVE DIAGNOSTICS | Facility: CLINIC | Age: 83
Discharge: HOME | End: 2018-12-07
Payer: COMMERCIAL

## 2018-12-07 DIAGNOSIS — I72.8 SPLENIC ARTERY ANEURYSM (HCC): ICD-10-CM

## 2018-12-07 PROCEDURE — 93975 VASCULAR STUDY: CPT

## 2018-12-07 PROCEDURE — 93975 VASCULAR STUDY: CPT | Performed by: SURGERY

## 2019-03-21 LAB
CREAT ?TM UR-SCNC: 132 UMOL/L
EXT MICROALBUMIN URINE RANDOM: 0.7
HBA1C MFR BLD HPLC: 6.8 %
MICROALBUMIN/CREAT UR: 5 MG/G{CREAT}

## 2019-03-27 PROBLEM — E11.51 TYPE 2 DIABETES MELLITUS WITH DIABETIC PERIPHERAL ANGIOPATHY WITHOUT GANGRENE (HCC): Status: ACTIVE | Noted: 2019-03-27

## 2019-03-27 PROBLEM — I73.9 PERIPHERAL VASCULAR DISEASE, UNSPECIFIED (HCC): Status: ACTIVE | Noted: 2019-03-27

## 2019-04-04 ENCOUNTER — OFFICE VISIT (OUTPATIENT)
Dept: INTERNAL MEDICINE CLINIC | Facility: CLINIC | Age: 84
End: 2019-04-04
Payer: COMMERCIAL

## 2019-04-04 VITALS
HEART RATE: 56 BPM | TEMPERATURE: 96.3 F | SYSTOLIC BLOOD PRESSURE: 130 MMHG | DIASTOLIC BLOOD PRESSURE: 68 MMHG | WEIGHT: 168 LBS | HEIGHT: 70 IN | OXYGEN SATURATION: 99 % | BODY MASS INDEX: 24.05 KG/M2

## 2019-04-04 DIAGNOSIS — L30.9 DERMATITIS: Primary | ICD-10-CM

## 2019-04-04 DIAGNOSIS — E78.00 PURE HYPERCHOLESTEROLEMIA: ICD-10-CM

## 2019-04-04 DIAGNOSIS — E11.51 TYPE 2 DIABETES MELLITUS WITH DIABETIC PERIPHERAL ANGIOPATHY WITHOUT GANGRENE, WITH LONG-TERM CURRENT USE OF INSULIN (HCC): ICD-10-CM

## 2019-04-04 DIAGNOSIS — I25.10 ARTERIOSCLEROTIC CARDIOVASCULAR DISEASE: ICD-10-CM

## 2019-04-04 DIAGNOSIS — Z79.4 TYPE 2 DIABETES MELLITUS WITH DIABETIC PERIPHERAL ANGIOPATHY WITHOUT GANGRENE, WITH LONG-TERM CURRENT USE OF INSULIN (HCC): ICD-10-CM

## 2019-04-04 DIAGNOSIS — I10 BENIGN ESSENTIAL HYPERTENSION: ICD-10-CM

## 2019-04-04 PROCEDURE — 3078F DIAST BP <80 MM HG: CPT | Performed by: INTERNAL MEDICINE

## 2019-04-04 PROCEDURE — 99214 OFFICE O/P EST MOD 30 MIN: CPT | Performed by: INTERNAL MEDICINE

## 2019-04-04 PROCEDURE — 1036F TOBACCO NON-USER: CPT | Performed by: INTERNAL MEDICINE

## 2019-04-04 PROCEDURE — 3075F SYST BP GE 130 - 139MM HG: CPT | Performed by: INTERNAL MEDICINE

## 2019-04-04 PROCEDURE — 1160F RVW MEDS BY RX/DR IN RCRD: CPT | Performed by: INTERNAL MEDICINE

## 2019-04-04 RX ORDER — CLOTRIMAZOLE AND BETAMETHASONE DIPROPIONATE 10; .64 MG/G; MG/G
CREAM TOPICAL 2 TIMES DAILY
Qty: 30 G | Refills: 4 | Status: SHIPPED | OUTPATIENT
Start: 2019-04-04 | End: 2020-11-17 | Stop reason: ALTCHOICE

## 2019-08-06 PROBLEM — K52.9 GASTROENTERITIS: Status: ACTIVE | Noted: 2019-08-06

## 2019-08-13 ENCOUNTER — APPOINTMENT (OUTPATIENT)
Dept: LAB | Facility: CLINIC | Age: 84
End: 2019-08-13
Payer: COMMERCIAL

## 2019-08-13 ENCOUNTER — OFFICE VISIT (OUTPATIENT)
Dept: INTERNAL MEDICINE CLINIC | Facility: CLINIC | Age: 84
End: 2019-08-13
Payer: COMMERCIAL

## 2019-08-13 VITALS
TEMPERATURE: 98 F | HEIGHT: 70 IN | OXYGEN SATURATION: 94 % | WEIGHT: 163.8 LBS | BODY MASS INDEX: 23.45 KG/M2 | HEART RATE: 61 BPM | DIASTOLIC BLOOD PRESSURE: 70 MMHG | RESPIRATION RATE: 16 BRPM | SYSTOLIC BLOOD PRESSURE: 124 MMHG

## 2019-08-13 DIAGNOSIS — R29.898 RIGHT ARM WEAKNESS: ICD-10-CM

## 2019-08-13 DIAGNOSIS — I10 BENIGN ESSENTIAL HYPERTENSION: ICD-10-CM

## 2019-08-13 DIAGNOSIS — K92.1: Primary | ICD-10-CM

## 2019-08-13 DIAGNOSIS — R25.1 TREMOR: ICD-10-CM

## 2019-08-13 LAB
ANION GAP SERPL CALCULATED.3IONS-SCNC: 4 MMOL/L (ref 4–13)
BUN SERPL-MCNC: 17 MG/DL (ref 5–25)
CALCIUM SERPL-MCNC: 9 MG/DL (ref 8.3–10.1)
CHLORIDE SERPL-SCNC: 105 MMOL/L (ref 100–108)
CO2 SERPL-SCNC: 30 MMOL/L (ref 21–32)
CREAT SERPL-MCNC: 1.23 MG/DL (ref 0.6–1.3)
GFR SERPL CREATININE-BSD FRML MDRD: 54 ML/MIN/1.73SQ M
GLUCOSE SERPL-MCNC: 157 MG/DL (ref 65–140)
POTASSIUM SERPL-SCNC: 4 MMOL/L (ref 3.5–5.3)
SODIUM SERPL-SCNC: 139 MMOL/L (ref 136–145)

## 2019-08-13 PROCEDURE — 80048 BASIC METABOLIC PNL TOTAL CA: CPT

## 2019-08-13 PROCEDURE — 36415 COLL VENOUS BLD VENIPUNCTURE: CPT

## 2019-08-13 PROCEDURE — 99214 OFFICE O/P EST MOD 30 MIN: CPT | Performed by: INTERNAL MEDICINE

## 2019-08-13 NOTE — ASSESSMENT & PLAN NOTE
Patient states that he has been having problems with have some weakness in his right arm  His family and friends also noticed that he has developed a tremor although this cannot be evaluated in the office today because with maneuvers there is no tremor present  He does not have any micrographia  No objective weakness on examination today  But with the combination of tremor, weakness we think it is extremely important the patient undergo a CT scan of the brain to make sure that there is no lesion  He will be returning to the office for his routine follow-up appointment in approximately 2 weeks  Hopefully the CT scan will be obtained prior to that visit

## 2019-08-13 NOTE — ASSESSMENT & PLAN NOTE
Again not only his wife but other people have noticed a tremor with the patient  Apparently this is at rest and not an intention tremor  He does have problems with picking up utensils in order to eat occasionally  On evaluation today we could not induce a tremor with the patient  He has no significant abnormalities with gait  Again we feel that a CT scan of the brain would be important, if unable to elicit the etiology of tremor consider evaluation, consult with Neurology

## 2019-08-13 NOTE — PROGRESS NOTES
Assessment/Plan:    Blood in stool, mis  Patient is being seen today after an episode of gastroenteritis of unknown etiology  Patient apparently had severe profuse diarrhea after eating at a [employer]  This went on for approximately 2 days and then he did call the office  We suggested with him having no abdominal cramps fever or chills to try Imodium right ear and this was successful and stating his diarrhea illness  He states his appetite is back to normal and he is keeping himself well hydrated  Patient noted that with a few stools he did have some blood in the stools  Because of this we will have a consultation with a gastroenterologist   He recently had a routine colonoscopy and he was told he did not have to have another performed but this is urgent with change in his bowel habits  Right arm weakness  Patient states that he has been having problems with have some weakness in his right arm  His family and friends also noticed that he has developed a tremor although this cannot be evaluated in the office today because with maneuvers there is no tremor present  He does not have any micrographia  No objective weakness on examination today  But with the combination of tremor, weakness we think it is extremely important the patient undergo a CT scan of the brain to make sure that there is no lesion  He will be returning to the office for his routine follow-up appointment in approximately 2 weeks  Hopefully the CT scan will be obtained prior to that visit  Tremor  Again not only his wife but other people have noticed a tremor with the patient  Apparently this is at rest and not an intention tremor  He does have problems with picking up utensils in order to eat occasionally  On evaluation today we could not induce a tremor with the patient  He has no significant abnormalities with gait    Again we feel that a CT scan of the brain would be important, if unable to elicit the etiology of tremor consider evaluation, consult with Neurology  Benign essential hypertension  Blood pressure is controlled  Patient will continue present medication and surveillance       Diagnoses and all orders for this visit:    Blood in stool, mis  -     Ambulatory referral to Gastroenterology; Future    Tremor  -     CT head wo contrast; Future  -     Basic metabolic panel; Future    Right arm weakness  -     CT head wo contrast; Future  -     Basic metabolic panel; Future    Benign essential hypertension  -     Basic metabolic panel; Future          Subjective:      Patient ID: Ludivina Collins  is a 81 y o  male  Patient is an 80-year-old male with a history of multiple medical problems  Patient is here today for evaluation of 2 different problems  1   Patient did have a recent diarrheal illness  Patient was instructed to take Imodium and did have resolution of his diarrhea and he states his bowels are back to normal   He did have an episode of bright red blood per rectum with his diarrheal illness and he needs further workup  Patient also is having problems with continued weakness to the right arm  And his wife states that he has developed a tremor that is chronic and constant  Patient denies any headache  Only 1 episode of lightheadedness  No blurred or double vision  Patient notices a significant change in his ability to use his right arm especially with bowling      The following portions of the patient's history were reviewed and updated as appropriate:   He  has a past medical history of Diabetes mellitus ([address] ) and Hypertension    He   Patient Active Problem List    Diagnosis Date Noted    Blood in stool, mis 08/13/2019    Tremor 08/13/2019    Right arm weakness 08/13/2019    Gastroenteritis 08/06/2019    Type 2 diabetes mellitus with diabetic peripheral 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 ([facility] ) 03/27/2019    Peripheral vascular disease, unspecified ([facility] ) 03/27/2019    Healthcare maintenance 10/04/2018    Other age-related cataract 10/01/2018    Dehydration 08/23/2018    Coronary artery disease involving native coronary artery of native heart 08/23/2018    Lactic acidosis 08/23/2018    Fever 08/23/2018    Dizziness 08/23/2018    Arteriosclerotic cardiovascular disease 02/07/2013    Benign essential hypertension 02/07/2013    Controlled insulin dependent diabetes mellitus ([facility] ) 02/07/2013    Enlarged prostate without lower urinary tract symptoms (luts) 02/07/2013    Hyperlipidemia 02/07/2013     He  has a past surgical history that includes Cardiac surgery  His family history is not on file  He  reports that he has quit smoking  His smoking use included cigarettes  He has never used smokeless tobacco  He reports that he drinks alcohol  He reports that he does not use drugs  Current Outpatient Medications   Medication Sig Dispense Refill    aspirin (ASPIR-81) 81 mg EC tablet Take 1 tablet by mouth daily      clotrimazole-betamethasone (LOTRISONE) 1-0 05 % cream Apply topically 2 (two) times a day 30 g 4    insulin detemir (LEVEMIR) 100 units/mL subcutaneous injection Inject 27 Units under the skin daily at bedtime 15units Qam and 18units QHS      lisinopril (ZESTRIL) 10 mg tablet Take 10 mg by mouth daily        ONE TOUCH ULTRA TEST test strip       simvastatin (ZOCOR) 40 mg tablet Take 40 mg by mouth daily at bedtime         No current facility-administered medications for this visit        Current Outpatient Medications on File Prior to Visit   Medication Sig    aspirin (ASPIR-81) 81 mg EC tablet Take 1 tablet by mouth daily    clotrimazole-betamethasone (LOTRISONE) 1-0 05 % cream Apply topically 2 (two) times a day    insulin detemir (LEVEMIR) 100 units/mL subcutaneous injection Inject 27 Units under the skin daily at bedtime 15units Qam and 18units QHS    lisinopril (ZESTRIL) 10 mg tablet Take 10 mg by mouth daily      ONE TOUCH ULTRA TEST test strip     simvastatin (ZOCOR) 40 mg tablet Take 40 mg by mouth daily at bedtime       No current facility-administered medications on file prior to visit  He has No Known Allergies       Review of Systems   Constitutional: Negative  HENT: Negative  Eyes: Negative  Respiratory: Negative  Cardiovascular: Negative  Gastrointestinal: Positive for blood in stool and diarrhea  Negative for abdominal distention, abdominal pain, anal bleeding, constipation, nausea, rectal pain and vomiting  Endocrine: Negative  Genitourinary: Negative  Musculoskeletal: Negative  Skin: Negative  Allergic/Immunologic: Negative  Neurological: Positive for tremors, weakness and light-headedness  Negative for dizziness, seizures, syncope, facial asymmetry, speech difficulty, numbness and headaches  Hematological: Negative  Psychiatric/Behavioral: Negative  Objective:      /70 (BP Location: Left arm, Patient Position: Sitting)   Pulse 61   Temp 98 °F (36 7 °C)   Resp 16   Ht 5' 10" (1 778 m)   Wt 74 3 kg (163 lb 12 8 oz)   SpO2 94%   BMI 23 50 kg/m²          Physical Exam   Constitutional: He is oriented to person, place, and time  He appears well-developed and well-nourished  No distress  Very pleasant, soft-spoken 64-year-old male who is awake alert in no acute distress and oriented x3   HENT:   Head: Normocephalic and atraumatic  Right Ear: External ear normal    Left Ear: External ear normal    Nose: Nose normal    Mouth/Throat: Oropharynx is clear and moist  No oropharyngeal exudate  Eyes: Pupils are equal, round, and reactive to light  Conjunctivae and EOM are normal  Right eye exhibits no discharge  Left eye exhibits no discharge  No scleral icterus  Neck: Normal range of motion  Neck supple  No JVD present  No tracheal deviation present  No thyromegaly present  Cardiovascular: Normal rate, regular rhythm, normal heart sounds and intact distal pulses  Exam reveals no gallop and no friction rub     No murmur heard  Pulmonary/Chest: Effort normal and breath sounds normal  No stridor  No respiratory distress  He has no wheezes  He has no rales  He exhibits no tenderness  Abdominal: Soft  Bowel sounds are normal  He exhibits no distension and no mass  There is no tenderness  There is no rebound and no guarding  No hernia  Musculoskeletal: Normal range of motion  He exhibits deformity  He exhibits no edema or tenderness  Lymphadenopathy:     He has no cervical adenopathy  Neurological: He is alert and oriented to person, place, and time  He displays normal reflexes  No cranial nerve deficit or sensory deficit  He exhibits normal muscle tone  Coordination normal    On evaluation cranial nerves 2-12 were grossly intact on exam   Patient has no focal neurologic changes either sensory or motor a  Unable to elicit a tremor he either resting or intentional   Patient has no weakness significantly to the right side compared to the left  Patient has no micrographia  No facial asymmetry  Skin: Skin is dry  Capillary refill takes less than 2 seconds  No rash noted  He is not diaphoretic  No erythema  No pallor  Psychiatric: His behavior is normal  Judgment and thought content normal    Somewhat flat affect and mood   Nursing note and vitals reviewed

## 2019-08-13 NOTE — ASSESSMENT & PLAN NOTE
Patient is being seen today after an episode of gastroenteritis of unknown etiology  Patient apparently had severe profuse diarrhea after eating at a [employer]  This went on for approximately 2 days and then he did call the office  We suggested with him having no abdominal cramps fever or chills to try Imodium right ear and this was successful and stating his diarrhea illness  He states his appetite is back to normal and he is keeping himself well hydrated  Patient noted that with a few stools he did have some blood in the stools  Because of this we will have a consultation with a gastroenterologist   He recently had a routine colonoscopy and he was told he did not have to have another performed but this is urgent with change in his bowel habits

## 2019-08-19 ENCOUNTER — HOSPITAL ENCOUNTER (OUTPATIENT)
Dept: RADIOLOGY | Age: 84
Discharge: HOME | End: 2019-08-19
Payer: COMMERCIAL

## 2019-08-19 DIAGNOSIS — R29.898 RIGHT ARM WEAKNESS: ICD-10-CM

## 2019-08-19 DIAGNOSIS — R25.1 TREMOR: ICD-10-CM

## 2019-08-19 PROCEDURE — 70450 CT HEAD/BRAIN W/O DYE: CPT

## 2019-08-21 ENCOUNTER — OFFICE VISIT (OUTPATIENT)
Dept: INTERNAL MEDICINE CLINIC | Facility: CLINIC | Age: 84
End: 2019-08-21
Payer: COMMERCIAL

## 2019-08-21 VITALS
OXYGEN SATURATION: 97 % | HEIGHT: 70 IN | SYSTOLIC BLOOD PRESSURE: 122 MMHG | WEIGHT: 163 LBS | HEART RATE: 97 BPM | BODY MASS INDEX: 23.34 KG/M2 | DIASTOLIC BLOOD PRESSURE: 80 MMHG | TEMPERATURE: 97.6 F

## 2019-08-21 DIAGNOSIS — G20 PARKINSON'S SYNDROME (HCC): Primary | ICD-10-CM

## 2019-08-21 DIAGNOSIS — I10 BENIGN ESSENTIAL HYPERTENSION: ICD-10-CM

## 2019-08-21 DIAGNOSIS — R29.898 RIGHT ARM WEAKNESS: ICD-10-CM

## 2019-08-21 DIAGNOSIS — IMO0001 CONTROLLED INSULIN DEPENDENT DIABETES MELLITUS: ICD-10-CM

## 2019-08-21 PROCEDURE — 99214 OFFICE O/P EST MOD 30 MIN: CPT | Performed by: INTERNAL MEDICINE

## 2019-08-21 NOTE — ASSESSMENT & PLAN NOTE
Patient is a walk-in today  He was complaining her recent exam of right arm weakness, some problems with coordination especially when bowling  With his symptoms patient was sent for a CT scan of the brain which shows microangiopathic changes but nothing acute no mass and no evidence of stroke  We did discuss the results of the CT scan  With discussion with the patient and his wife apparently he is having some problems with coordination and tremor which may be early signs of Parkinson's disease  We will make arrangements for the patient be seen by Neurology but in the interim we a place the patient on a low dose of carbo levodopa at 10/101 pill 3 times a day  We discussed possible side effects of the medication and he was told to stop it immediately if any adverse effects from the medication    We will be checking in on the patient in approximately 2 weeks

## 2019-08-21 NOTE — ASSESSMENT & PLAN NOTE
Blood pressure is controlled  Patient will continue present medication and surveillance    We will continue to monitor his renal function

## 2019-08-21 NOTE — PROGRESS NOTES
Assessment/Plan:    Right arm weakness  Patient is a walk-in today  He was complaining her recent exam of right arm weakness, some problems with coordination especially when bowling  With his symptoms patient was sent for a CT scan of the brain which shows microangiopathic changes but nothing acute no mass and no evidence of stroke  We did discuss the results of the CT scan  With discussion with the patient and his wife apparently he is having some problems with coordination and tremor which may be early signs of Parkinson's disease  We will make arrangements for the patient be seen by Neurology but in the interim we a place the patient on a low dose of carbo levodopa at 10/101 pill 3 times a day  We discussed possible side effects of the medication and he was told to stop it immediately if any adverse effects from the medication  We will be checking in on the patient in approximately 2 weeks    Controlled insulin dependent diabetes mellitus ([facility] )  Lab Results   Component Value Date    HGBA1C 6 8 03/21/2019       No results for input(s): POCGLU in the last 72 hours  Blood Sugar Average: Last 72 hrs:   as noted despite stopping his metformin is hemoglobin A1c shows good control of his blood sugars  Patient will continue with present surveillance, diet and medication  He continues to follow-up with Endocrinology and they continue to make adjustments to medication in order to keep his sugars under control  Benign essential hypertension  Blood pressure is controlled  Patient will continue present medication and surveillance  We will continue to monitor his renal function       Diagnoses and all orders for this visit:    Parkinson's syndrome (HCC)  -     carbidopa-levodopa (SINEMET)  mg per tablet; Take 1 tablet by mouth 3 (three) times a day    Right arm weakness  -     Ambulatory referral to Neurology;  Future    Controlled insulin dependent diabetes mellitus (HCC)    Benign essential hypertension          Subjective:      Patient ID: Chantal Henriquez  is a 81 y o  male  Patient is an 71-year-old male with a history of multiple medical problems as outlined previously  Patient is a walk-in today  Recently underwent a CAT scan of the brain because of complaints of right arm weakness, difficulties with coordination, patient who is here today with his wife who has an appointment with another physician decided to come in to be seen and discussed results of the CT scan      The following portions of the patient's history were reviewed and updated as appropriate:   He  has a past medical history of Diabetes mellitus ([facility] ) and Hypertension  He   Patient Active Problem List    Diagnosis Date Noted    Blood in stool, mis 08/13/2019    Tremor 08/13/2019    Right arm weakness 08/13/2019    Gastroenteritis 08/06/2019    Type 2 diabetes mellitus with diabetic peripheral 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 ([facility] ) 03/27/2019    Peripheral vascular disease, unspecified ([place] [address] ) 03/27/2019    Healthcare maintenance 10/04/2018    Other age-related cataract 10/01/2018    Dehydration 08/23/2018    Coronary artery disease involving native coronary artery of native heart 08/23/2018    Lactic acidosis 08/23/2018    Fever 08/23/2018    Dizziness 08/23/2018    Arteriosclerotic cardiovascular disease 02/07/2013    Benign essential hypertension 02/07/2013    Controlled insulin dependent diabetes mellitus ([facility] ) 02/07/2013    Enlarged prostate without lower urinary tract symptoms (luts) 02/07/2013    Hyperlipidemia 02/07/2013     He  has a past surgical history that includes Cardiac surgery  His family history is not on file  He  reports that he has quit smoking  His smoking use included cigarettes  He has never used smokeless tobacco  He reports that he drinks alcohol  He reports that he does not use drugs    Current Outpatient Medications   Medication Sig Dispense Refill    aspirin (ASPIR-81) 81 mg EC tablet Take 1 tablet by mouth daily      clotrimazole-betamethasone (LOTRISONE) 1-0 05 % cream Apply topically 2 (two) times a day 30 g 4    insulin detemir (LEVEMIR) 100 units/mL subcutaneous injection Inject 27 Units under the skin daily at bedtime 15units Qam and 18units QHS      lisinopril (ZESTRIL) 10 mg tablet Take 10 mg by mouth daily        ONE TOUCH ULTRA TEST test strip       simvastatin (ZOCOR) 40 mg tablet Take 40 mg by mouth daily at bedtime        carbidopa-levodopa (SINEMET)  mg per tablet Take 1 tablet by mouth 3 (three) times a day 90 tablet 5     No current facility-administered medications for this visit  Current Outpatient Medications on File Prior to Visit   Medication Sig    aspirin (ASPIR-81) 81 mg EC tablet Take 1 tablet by mouth daily    clotrimazole-betamethasone (LOTRISONE) 1-0 05 % cream Apply topically 2 (two) times a day    insulin detemir (LEVEMIR) 100 units/mL subcutaneous injection Inject 27 Units under the skin daily at bedtime 15units Qam and 18units QHS    lisinopril (ZESTRIL) 10 mg tablet Take 10 mg by mouth daily      ONE TOUCH ULTRA TEST test strip     simvastatin (ZOCOR) 40 mg tablet Take 40 mg by mouth daily at bedtime       No current facility-administered medications on file prior to visit  He has No Known Allergies       Review of Systems   Constitutional: Negative  HENT: Negative  Eyes: Negative  Respiratory: Negative  Cardiovascular: Negative  Gastrointestinal: Negative  Endocrine: Negative  Genitourinary: Negative  Musculoskeletal: Positive for arthralgias (Some diffuse arthritic aches and pains but nothing new or disabling) and neck stiffness  Negative for back pain, gait problem, joint swelling, myalgias and neck pain  Skin: Negative  Allergic/Immunologic: Negative  Neurological: Positive for tremors   Negative for dizziness, seizures, syncope, facial asymmetry, speech difficulty, weakness, light-headedness, numbness and headaches  Some difficulties with coordination with his right arm, slight instability with gait  He states he has changes of it been ongoing for at least the last year  He notices it more in his problems with his bowling   Hematological: Negative  Psychiatric/Behavioral: Negative  Objective:      /80 (BP Location: Left arm, Patient Position: Sitting, Cuff Size: Adult)   Pulse 97   Temp 97 6 °F (36 4 °C) (Tympanic)   Ht 5' 10" (1 778 m)   Wt 73 9 kg (163 lb)   SpO2 97%   BMI 23 39 kg/m²          Physical Exam   Constitutional: He is oriented to person, place, and time  He appears well-developed and well-nourished  No distress  Extremely soft spoken but articulate 25-year-old male who is awake alert in no acute distress, accompanied by his wife today for the appointment   HENT:   Head: Normocephalic and atraumatic  Right Ear: External ear normal    Left Ear: External ear normal    Nose: Nose normal    Mouth/Throat: Oropharynx is clear and moist  No oropharyngeal exudate  Eyes: Pupils are equal, round, and reactive to light  Conjunctivae and EOM are normal  Right eye exhibits no discharge  Left eye exhibits no discharge  No scleral icterus  Neck: Normal range of motion  Neck supple  No JVD present  No tracheal deviation present  No thyromegaly present  Cardiovascular: Normal rate and regular rhythm  Exam reveals no gallop and no friction rub  Murmur heard  Pulmonary/Chest: Effort normal and breath sounds normal  No stridor  No respiratory distress  He has no wheezes  He has no rales  He exhibits no tenderness  Abdominal: Soft  Bowel sounds are normal  He exhibits no distension and no mass  There is no tenderness  There is no rebound and no guarding  No hernia  Musculoskeletal: Normal range of motion  He exhibits deformity  He exhibits no edema or tenderness  Lymphadenopathy:     He has no cervical adenopathy     Neurological: He is alert and oriented to person, place, and time  He displays normal reflexes  No cranial nerve deficit or sensory deficit  He exhibits normal muscle tone  Coordination normal    Patient on neurologic examination shows no significant focal neurologic changes other than a mild tremor in his right arm  Note difficulties with coordination no cerebellar dysfunction with finger-nose, heel-shin, no problems with gait   Skin: Skin is warm and dry  No rash noted  He is not diaphoretic  No erythema  No pallor  Psychiatric: He has a normal mood and affect  His behavior is normal  Judgment and thought content normal    Nursing note and vitals reviewed

## 2019-08-21 NOTE — ASSESSMENT & PLAN NOTE
Lab Results   Component Value Date    HGBA1C 6 8 03/21/2019       No results for input(s): POCGLU in the last 72 hours  Blood Sugar Average: Last 72 hrs:   as noted despite stopping his metformin is hemoglobin A1c shows good control of his blood sugars  Patient will continue with present surveillance, diet and medication  He continues to follow-up with Endocrinology and they continue to make adjustments to medication in order to keep his sugars under control

## 2019-09-05 ENCOUNTER — OFFICE VISIT (OUTPATIENT)
Dept: INTERNAL MEDICINE CLINIC | Facility: CLINIC | Age: 84
End: 2019-09-05
Payer: COMMERCIAL

## 2019-09-05 VITALS
OXYGEN SATURATION: 94 % | HEART RATE: 50 BPM | WEIGHT: 162 LBS | DIASTOLIC BLOOD PRESSURE: 64 MMHG | HEIGHT: 70 IN | SYSTOLIC BLOOD PRESSURE: 146 MMHG | TEMPERATURE: 96.5 F | BODY MASS INDEX: 23.19 KG/M2

## 2019-09-05 DIAGNOSIS — Z79.4 TYPE 2 DIABETES MELLITUS WITH DIABETIC PERIPHERAL ANGIOPATHY WITHOUT GANGRENE, WITH LONG-TERM CURRENT USE OF INSULIN (HCC): ICD-10-CM

## 2019-09-05 DIAGNOSIS — G20 PARKINSON'S DISEASE (HCC): Primary | ICD-10-CM

## 2019-09-05 DIAGNOSIS — E11.51 TYPE 2 DIABETES MELLITUS WITH DIABETIC PERIPHERAL ANGIOPATHY WITHOUT GANGRENE, WITH LONG-TERM CURRENT USE OF INSULIN (HCC): ICD-10-CM

## 2019-09-05 DIAGNOSIS — I10 BENIGN ESSENTIAL HYPERTENSION: ICD-10-CM

## 2019-09-05 PROBLEM — G20.A1 PARKINSON'S DISEASE: Status: ACTIVE | Noted: 2019-09-05

## 2019-09-05 PROCEDURE — 99214 OFFICE O/P EST MOD 30 MIN: CPT | Performed by: INTERNAL MEDICINE

## 2019-09-05 NOTE — PROGRESS NOTES
Assessment/Plan:    Parkinson's disease [facility])  Patient presented recently with problems with coordination, tremor mostly noted to the right arm  Patient did undergo CT scan showing no specific abnormalities in the brain  After examination patient was placed on a trial of carbo levodopa 10/100 to take 1 pill 3 times a day  Patient states that he has had significant improvement with coordination, ambulation, complete resolution of the weakness in his right arm with this medication  No side effects from the medication  Patient is being given at a tentative diagnosis of Parkinson's disease with his dramatic response and improvement with treatment  Patient does have a visit to be seen by Neurology in a few weeks for further evaluation and modification of treatment as necessary  Again patient is extremely pleased with the results  States he is now improving physically and is back to bowling with some improvement with his gain  Patient is extremely pleased with the results of treatment    Benign essential hypertension  As noted patient's blood pressure is slightly elevated from his previous baseline  We will be checking this in the near future  Make adjustments to medication if needed  We do not feel that the Sinemet is causing elevations in blood pressure reading and patient is slightly anxious today with taking his wife to the doctor to be seen    Type 2 diabetes mellitus with diabetic peripheral 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 ([facility] )  Lab Results   Component Value Date    HGBA1C 6 8 03/21/2019       No results for input(s): POCGLU in the last 72 hours  Blood Sugar Average: Last 72 hrs:   patient has longstanding history of diabetes mellitus type 2 on insulin  As noted his last hemoglobin A1c did show good control will level of 6 8  Were hoping that with the patient's increase exercise capacity we will continue to see good control of his diabetes  Patient continues to follow-up with Endocrinology    History of peripheral vascular disease and no new complaints of claudication symptoms or difficulties with chest pain or pressure  Diagnoses and all orders for this visit:    Parkinson's [facility] ([facility] )    Benign essential hypertension    Type 2 diabetes mellitus with diabetic peripheral 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, with long-term current use of insulin ([facility])          Subjective:      Patient ID: Amanda Montelongo  is a 81 y o  male  Patient is an 81-year-old male with a history of multiple medical problems as outlined previously  Patient is here today for routine follow-up after a 2 week period of time  Patient because of his neurologic symptomatology consistent with Parkinson's disease was placed on a low dose of Sinemet 10/101 pill 3 times a day  Patient states he is feeling much improved and has better coordination and his back to bowling and his average she is improving dramatically since starting the medication  Patient states he no longer has any problems with weakness over coordination to his right arm  He states no side effects notably from the medication      The following portions of the patient's history were reviewed and updated as appropriate:   He  has a past medical history of Diabetes mellitus ([facility] ) and Hypertension    He   Patient Active Problem List    Diagnosis Date Noted    Parkinson's disease ([facility] ) 09/05/2019    Blood in stool, mis 08/13/2019    Tremor 08/13/2019    Right arm weakness 08/13/2019    Gastroenteritis 08/06/2019    Type 2 diabetes mellitus with diabetic peripheral 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 ([facility] ) 03/27/2019    Peripheral vascular disease, unspecified ([facility] ) 03/27/2019    Healthcare maintenance 10/04/2018    Other age-related cataract 10/01/2018    Dehydration 08/23/2018    Coronary artery disease involving native coronary artery of native heart 08/23/2018    Lactic acidosis 08/23/2018    Fever 08/23/2018    Dizziness 08/23/2018    Arteriosclerotic cardiovascular disease 02/07/2013    Benign essential hypertension 02/07/2013    Controlled insulin dependent diabetes mellitus ([facility] ) 02/07/2013    Enlarged prostate without lower urinary tract symptoms (luts) 02/07/2013    Hyperlipidemia 02/07/2013     He  has a past surgical history that includes Cardiac surgery  His family history is not on file  He  reports that he has quit smoking  His smoking use included cigarettes  He has never used smokeless tobacco  He reports that he drinks alcohol  He reports that he does not use drugs  Current Outpatient Medications   Medication Sig Dispense Refill    aspirin (ASPIR-81) 81 mg EC tablet Take 1 tablet by mouth daily      carbidopa-levodopa (SINEMET)  mg per tablet Take 1 tablet by mouth 3 (three) times a day 90 tablet 5    clotrimazole-betamethasone (LOTRISONE) 1-0 05 % cream Apply topically 2 (two) times a day 30 g 4    insulin detemir (LEVEMIR) 100 units/mL subcutaneous injection Inject 27 Units under the skin daily at bedtime 15units Qam and 18units QHS      lisinopril (ZESTRIL) 10 mg tablet Take 10 mg by mouth daily        ONE TOUCH ULTRA TEST test strip       simvastatin (ZOCOR) 40 mg tablet Take 40 mg by mouth daily at bedtime         No current facility-administered medications for this visit        Current Outpatient Medications on File Prior to Visit   Medication Sig    aspirin (ASPIR-81) 81 mg EC tablet Take 1 tablet by mouth daily    carbidopa-levodopa (SINEMET)  mg per tablet Take 1 tablet by mouth 3 (three) times a day    clotrimazole-betamethasone (LOTRISONE) 1-0 05 % cream Apply topically 2 (two) times a day    insulin detemir (LEVEMIR) 100 units/mL subcutaneous injection Inject 27 Units under the skin daily at bedtime 15units Qam and 18units QHS    lisinopril (ZESTRIL) 10 mg tablet Take 10 mg by mouth daily      ONE TOUCH ULTRA TEST test strip     simvastatin (ZOCOR) 40 mg tablet Take 40 mg by mouth daily at bedtime       No current facility-administered medications on file prior to visit  He has No Known Allergies       Review of Systems   Constitutional: Positive for activity change (Patient is slowly increasing his activity level)  Negative for appetite change, chills, diaphoresis, fatigue, fever and unexpected weight change  HENT: Negative  Eyes: Negative  Respiratory: Negative  Cardiovascular: Negative  Gastrointestinal: Negative  Endocrine: Negative  Genitourinary: Negative  Musculoskeletal: Positive for arthralgias ( some minor diffuse arthritic aches and pains but nothing new or disabling)  Negative for back pain, gait problem, joint swelling and myalgias  Allergic/Immunologic: Negative  Neurological: Negative  As noted patient has had a dramatic improvement in his diffuse neurologic symptomatology  No further problems with tremor  Increasing strength and coordination to the upper lower extremities  Hematological: Negative  Psychiatric/Behavioral: Negative  Objective:      /64   Pulse (!) 50   Temp (!) 96 5 °F (35 8 °C)   Ht 5' 10" (1 778 m)   Wt 73 5 kg (162 lb)   SpO2 94%   BMI 23 24 kg/m²          Physical Exam   Constitutional: He is oriented to person, place, and time  He appears well-developed and well-nourished  No distress  Pleasant, extremely cheerful 50-year-old male who is awake alert no acute distress and oriented x3   HENT:   Head: Normocephalic and atraumatic  Right Ear: External ear normal    Left Ear: External ear normal    Nose: Nose normal    Mouth/Throat: Oropharynx is clear and moist  No oropharyngeal exudate  Eyes: Pupils are equal, round, and reactive to light  Conjunctivae and EOM are normal  Right eye exhibits no discharge  Left eye exhibits no discharge  No scleral icterus  Neck: Normal range of motion  Neck supple  No JVD present  No tracheal deviation present  No thyromegaly present     Cardiovascular: Normal rate, regular rhythm, normal heart sounds and intact distal pulses  Exam reveals no gallop and no friction rub  No murmur heard  Pulmonary/Chest: Effort normal and breath sounds normal  No stridor  No respiratory distress  He has no wheezes  He has no rales  He exhibits no tenderness  Abdominal: Soft  Bowel sounds are normal  He exhibits no distension and no mass  There is no tenderness  There is no rebound and no guarding  No hernia  Musculoskeletal: He exhibits deformity  He exhibits no edema or tenderness  Lymphadenopathy:     He has no cervical adenopathy  Neurological: He is alert and oriented to person, place, and time  He displays normal reflexes  No cranial nerve deficit or sensory deficit  He exhibits normal muscle tone  Coordination normal    Patient did go through thorough neurologic exam today  Patient has no focal findings on examination  Patient has complete resolution of his tremor  No cogwheeling her facial abnormalities  Good tone and strength to upper lower extremities   Skin: Skin is warm  No rash noted  He is not diaphoretic  No erythema  No pallor  Psychiatric: He has a normal mood and affect  His behavior is normal  Thought content normal    Nursing note and vitals reviewed

## 2019-09-05 NOTE — ASSESSMENT & PLAN NOTE
Patient presented recently with problems with coordination, tremor mostly noted to the right arm  Patient did undergo CT scan showing no specific abnormalities in the brain  After examination patient was placed on a trial of carbo levodopa 10/100 to take 1 pill 3 times a day  Patient states that he has had significant improvement with coordination, ambulation, complete resolution of the weakness in his right arm with this medication  No side effects from the medication  Patient is being given at a tentative diagnosis of Parkinson's disease with his dramatic response and improvement with treatment  Patient does have a visit to be seen by Neurology in a few weeks for further evaluation and modification of treatment as necessary  Again patient is extremely pleased with the results  States he is now improving physically and is back to bowling with some improvement with his gain    Patient is extremely pleased with the results of treatment

## 2019-09-05 NOTE — ASSESSMENT & PLAN NOTE
Lab Results   Component Value Date    HGBA1C 6 8 03/21/2019       No results for input(s): POCGLU in the last 72 hours  Blood Sugar Average: Last 72 hrs:   patient has longstanding history of diabetes mellitus type 2 on insulin  As noted his last hemoglobin A1c did show good control will level of 6 8  Were hoping that with the patient's increase exercise capacity we will continue to see good control of his diabetes  Patient continues to follow-up with Endocrinology  History of peripheral vascular disease and no new complaints of claudication symptoms or difficulties with chest pain or pressure

## 2019-09-05 NOTE — ASSESSMENT & PLAN NOTE
As noted patient's blood pressure is slightly elevated from his previous baseline  We will be checking this in the near future  Make adjustments to medication if needed    We do not feel that the Sinemet is causing elevations in blood pressure reading and patient is slightly anxious today with taking his wife to the doctor to be seen

## 2019-09-18 ENCOUNTER — CONSULT (OUTPATIENT)
Dept: NEUROLOGY | Facility: CLINIC | Age: 84
End: 2019-09-18
Payer: COMMERCIAL

## 2019-09-18 DIAGNOSIS — R25.1 TREMOR: Primary | ICD-10-CM

## 2019-09-18 DIAGNOSIS — R00.1 BRADYCARDIA: ICD-10-CM

## 2019-09-18 DIAGNOSIS — Z79.4 TYPE 2 DIABETES MELLITUS WITH DIABETIC PERIPHERAL ANGIOPATHY WITHOUT GANGRENE, WITH LONG-TERM CURRENT USE OF INSULIN (HCC): ICD-10-CM

## 2019-09-18 DIAGNOSIS — IMO0001 CONTROLLED INSULIN DEPENDENT DIABETES MELLITUS: ICD-10-CM

## 2019-09-18 DIAGNOSIS — E11.51 TYPE 2 DIABETES MELLITUS WITH DIABETIC PERIPHERAL ANGIOPATHY WITHOUT GANGRENE, WITH LONG-TERM CURRENT USE OF INSULIN (HCC): ICD-10-CM

## 2019-09-18 DIAGNOSIS — E78.00 PURE HYPERCHOLESTEROLEMIA: ICD-10-CM

## 2019-09-18 DIAGNOSIS — R29.898 RIGHT ARM WEAKNESS: ICD-10-CM

## 2019-09-18 DIAGNOSIS — R26.89 BALANCE DISORDER: ICD-10-CM

## 2019-09-18 PROCEDURE — 99205 OFFICE O/P NEW HI 60 MIN: CPT | Performed by: PSYCHIATRY & NEUROLOGY

## 2019-09-18 NOTE — PROGRESS NOTES
[facility] MULTIPLE [facility]CLEROSIS CENTER  PATIENT:  Rosaleen Skiff  MRN:  [id number]  :  1935  DATE OF SERVICE:  2019    Assessment/Plan:     Problem List Items Addressed This Visit        Endocrine    Controlled insulin dependent diabetes mellitus ([facility] [address] )    Type 2 diabetes mellitus with diabetic peripheral 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 ([facility] [address] )       Other    Hyperlipidemia    Tremor - Primary    Relevant Orders    MRI brain without contrast    Right arm weakness    Bradycardia      Other Visit Diagnoses     Balance disorder        Relevant Orders    MRI brain without contrast           Mr Nithya Green has carotid cardiovascular disease/CAD, HTN, DM/lactic acidosis, PVD, HLD, bradycardia, who presented for evaluation of tremor and balance disorder  Patient described symptoms started 2-3 years ago, with progressive worsening noted by the family  We personally reviewed his CT head with mild atrophy on midbrain noted  Patient has significant improvement in his tremor and ambulatory function as well as coordination, as he is able to play bowling with his friends  Patient did not described  Hypophonia/RLS/constipation, no sense of smell changes, no falls, no family history of parkinsonism  Patient had completed several tests in the office with mainly unremarkable spirals noted, intact ambulation, no turning en bloc, no cogwheel rigidity appreciated  No tremors seen during the visit  Patient has no brisk reflexes, and mild abnormality noted on retropulsion test  Patient does accepts he has forgetfulness, but no profound cognitive decline noted  No signs of NPH on his CT head  Patient was advised to consider MRI brain WO contrast and follow up with [facility] Dr Gal Mcbride  Patient is to continue cabidopa-levodopa  mg tid for parkinsonism, concern for CBD as flailed arm was described as well, no side effects to Sinemet  Reported  Frontal lobe atrophy noted - some concern might be parkinsonism with FTD  Patient responded to Sinemet well, might be he is in early stage pf Parkinson's disease  DatScan was discussed as well  High blood pressure and bradycardia was discussed as well- patient was asymptomatic, follow up with primary care team          Subjective: tremor, balance disorder    HPI History of Present Illness    Mr Josefa Flores has presented for evaluation of tremor and disbalance  Patient' wife stated he has improved tremor in right hand as he also had flailed arm on frequent occasions  Since [id number], patient had progression of his disbalance and coordination; patient has no hypophonia, and no dysphagia; Patient has remarkable improvement in his rigth arm and tremor after he was started sinemet  mg tid  Patient has no weakness, no head tremor; no voice tremor  No family history of parkinsonism in his family  Patient had CT head - we personally reviewed his studies and no pathology noted  We discussed high pressure with bradycardia, he has known CAD  The following portions of the patient's history were reviewed and updated as appropriate: He  has a past medical history of Diabetes mellitus ([facility] [address] ) and Hypertension    He   Patient Active Problem List    Diagnosis Date Noted    Bradycardia 09/18/2019    Parkinson's disease ([facility] [address] ) 09/05/2019    Blood in stool, mis 08/13/2019    Tremor 08/13/2019    Right arm weakness 08/13/2019    Gastroenteritis 08/06/2019    Type 2 diabetes mellitus with diabetic peripheral 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 ([facility] [address] ) 03/27/2019    Peripheral vascular disease, unspecified ([facility] [address] ) 03/27/2019    Healthcare maintenance 10/04/2018    Other age-related cataract 10/01/2018    Dehydration 08/23/2018    Coronary artery disease involving native coronary artery of native heart 08/23/2018    Lactic acidosis 08/23/2018    Fever 08/23/2018    Dizziness 08/23/2018    Arteriosclerotic cardiovascular disease 02/07/2013    Benign essential hypertension 02/07/2013    Controlled insulin dependent diabetes mellitus ([facility] [address] ) 02/07/2013    Enlarged prostate without lower urinary tract symptoms (luts) 02/07/2013    Hyperlipidemia 02/07/2013     He  has a past surgical history that includes Cardiac surgery  His family history is not on file  He  reports that he has quit smoking  His smoking use included cigarettes  He has never used smokeless tobacco  He reports that he drinks alcohol  He reports that he does not use drugs  Current Outpatient Medications   Medication Sig Dispense Refill    aspirin (ASPIR-81) 81 mg EC tablet Take 1 tablet by mouth daily      carbidopa-levodopa (SINEMET)  mg per tablet Take 1 tablet by mouth 3 (three) times a day 90 tablet 5    clotrimazole-betamethasone (LOTRISONE) 1-0 05 % cream Apply topically 2 (two) times a day 30 g 4    insulin detemir (LEVEMIR) 100 units/mL subcutaneous injection Inject 27 Units under the skin daily at bedtime 15units Qam and 18units QHS      lisinopril (ZESTRIL) 10 mg tablet Take 10 mg by mouth daily        ONE TOUCH ULTRA TEST test strip       simvastatin (ZOCOR) 40 mg tablet Take 40 mg by mouth daily at bedtime         No current facility-administered medications for this visit  Current Outpatient Medications on File Prior to Visit   Medication Sig    aspirin (ASPIR-81) 81 mg EC tablet Take 1 tablet by mouth daily    carbidopa-levodopa (SINEMET)  mg per tablet Take 1 tablet by mouth 3 (three) times a day    clotrimazole-betamethasone (LOTRISONE) 1-0 05 % cream Apply topically 2 (two) times a day    insulin detemir (LEVEMIR) 100 units/mL subcutaneous injection Inject 27 Units under the skin daily at bedtime 15units Qam and 18units QHS    lisinopril (ZESTRIL) 10 mg tablet Take 10 mg by mouth daily      ONE TOUCH ULTRA TEST test strip     simvastatin (ZOCOR) 40 mg tablet Take 40 mg by mouth daily at bedtime       No current facility-administered medications on file prior to visit  He has No Known Allergies            Objective: There were no vitals taken for this visit  Physical Exam/Neurological Exam  CONSTITUTIONAL: NAD, pleasant  NECK: supple, no lymphadenopathy, no thyromegaly, no JVD  CARDIOVASCULAR: RRR, normal S1S2, no murmurs, no rubs  RESP: clear to auscultation bilaterally, no wheezes/rhonchi/rales  ABDOMEN: soft, non tender, non distended  SKIN: no rash or skin lesions  EXTREMITIES: no edema, pulses 2+bilaterally  PSYCH: appropriate mood and affect  NEUROLOGIC COMPREHENSIVE EXAM: Patient is oriented to person, place and time, NAD; appropriate affect  CN II, III, IV, V, VI, VII,VIII,IX,X,XI-XII intact with EOMI, PERRLA, OKN intact, VF grossly intact, fundi poorly visualized secondary to pupillary constriction; symmetric face noted  Motor: 5/5 UE/LE bilateral symmetric; Sensory: intact to light touch and pinprick bilaterally; normal vibration sensation feet bilaterally; Coordination within normal limits on FTN and CHERYL testing; DTR: 2/4 through, no Babinski, no clonus  Tandem gait is intact  Romberg: negative  ROS:  12 points of review of system was reviewed with the patient and was unremarkable with exception: see HPI  Review of Systems   Musculoskeletal: Positive for gait problem  Neurological: Positive for tremors and weakness  All other systems reviewed and are negative

## 2019-09-19 ENCOUNTER — APPOINTMENT (OUTPATIENT)
Dept: LAB | Facility: CLINIC | Age: 84
End: 2019-09-19
Payer: COMMERCIAL

## 2019-09-19 ENCOUNTER — TRANSCRIBE ORDERS (OUTPATIENT)
Dept: LAB | Facility: CLINIC | Age: 84
End: 2019-09-19

## 2019-09-19 DIAGNOSIS — I51.9 MYXEDEMA HEART DISEASE: ICD-10-CM

## 2019-09-19 DIAGNOSIS — E13.8 DIABETES MELLITUS OF OTHER TYPE WITH COMPLICATION, UNSPECIFIED WHETHER LONG TERM INSULIN USE: ICD-10-CM

## 2019-09-19 DIAGNOSIS — E03.9 MYXEDEMA HEART DISEASE: ICD-10-CM

## 2019-09-19 DIAGNOSIS — E13.8 DIABETES MELLITUS OF OTHER TYPE WITH COMPLICATION, UNSPECIFIED WHETHER LONG TERM INSULIN USE: Primary | ICD-10-CM

## 2019-09-19 DIAGNOSIS — E78.5 HYPERLIPIDEMIA, UNSPECIFIED HYPERLIPIDEMIA TYPE: ICD-10-CM

## 2019-09-19 LAB
ALBUMIN SERPL BCP-MCNC: 3.8 G/DL (ref 3.5–5)
ALP SERPL-CCNC: 62 U/L (ref 46–116)
ALT SERPL W P-5'-P-CCNC: 14 U/L (ref 12–78)
ANION GAP SERPL CALCULATED.3IONS-SCNC: 6 MMOL/L (ref 4–13)
AST SERPL W P-5'-P-CCNC: 17 U/L (ref 5–45)
BASOPHILS # BLD AUTO: 0.05 THOUSANDS/ΜL (ref 0–0.1)
BASOPHILS NFR BLD AUTO: 1 % (ref 0–1)
BILIRUB SERPL-MCNC: 0.43 MG/DL (ref 0.2–1)
BUN SERPL-MCNC: 24 MG/DL (ref 5–25)
CALCIUM SERPL-MCNC: 9.4 MG/DL (ref 8.3–10.1)
CHLORIDE SERPL-SCNC: 107 MMOL/L (ref 100–108)
CO2 SERPL-SCNC: 29 MMOL/L (ref 21–32)
CREAT SERPL-MCNC: 1.36 MG/DL (ref 0.6–1.3)
CREAT UR-MCNC: 64.6 MG/DL
EOSINOPHIL # BLD AUTO: 0.33 THOUSAND/ΜL (ref 0–0.61)
EOSINOPHIL NFR BLD AUTO: 6 % (ref 0–6)
ERYTHROCYTE [DISTWIDTH] IN BLOOD BY AUTOMATED COUNT: 13.4 % (ref 11.6–15.1)
EST. AVERAGE GLUCOSE BLD GHB EST-MCNC: 137 MG/DL
GFR SERPL CREATININE-BSD FRML MDRD: 48 ML/MIN/1.73SQ M
GLUCOSE P FAST SERPL-MCNC: 135 MG/DL (ref 65–99)
HBA1C MFR BLD: 6.4 % (ref 4.2–6.3)
HCT VFR BLD AUTO: 41.1 % (ref 36.5–49.3)
HGB BLD-MCNC: 13.3 G/DL (ref 12–17)
IMM GRANULOCYTES # BLD AUTO: 0.01 THOUSAND/UL (ref 0–0.2)
IMM GRANULOCYTES NFR BLD AUTO: 0 % (ref 0–2)
LYMPHOCYTES # BLD AUTO: 1.35 THOUSANDS/ΜL (ref 0.6–4.47)
LYMPHOCYTES NFR BLD AUTO: 23 % (ref 14–44)
MAGNESIUM SERPL-MCNC: 2.2 MG/DL (ref 1.6–2.6)
MCH RBC QN AUTO: 30.3 PG (ref 26.8–34.3)
MCHC RBC AUTO-ENTMCNC: 32.4 G/DL (ref 31.4–37.4)
MCV RBC AUTO: 94 FL (ref 82–98)
MICROALBUMIN UR-MCNC: 5.8 MG/L (ref 0–20)
MICROALBUMIN/CREAT 24H UR: 9 MG/G CREATININE (ref 0–30)
MONOCYTES # BLD AUTO: 0.37 THOUSAND/ΜL (ref 0.17–1.22)
MONOCYTES NFR BLD AUTO: 6 % (ref 4–12)
NEUTROPHILS # BLD AUTO: 3.8 THOUSANDS/ΜL (ref 1.85–7.62)
NEUTS SEG NFR BLD AUTO: 64 % (ref 43–75)
NRBC BLD AUTO-RTO: 0 /100 WBCS
PHOSPHATE SERPL-MCNC: 3.6 MG/DL (ref 2.3–4.1)
PLATELET # BLD AUTO: 177 THOUSANDS/UL (ref 149–390)
PMV BLD AUTO: 10.9 FL (ref 8.9–12.7)
POTASSIUM SERPL-SCNC: 4.7 MMOL/L (ref 3.5–5.3)
PROT SERPL-MCNC: 6.8 G/DL (ref 6.4–8.2)
RBC # BLD AUTO: 4.39 MILLION/UL (ref 3.88–5.62)
SODIUM SERPL-SCNC: 142 MMOL/L (ref 136–145)
T4 FREE SERPL-MCNC: 0.71 NG/DL (ref 0.76–1.46)
TSH SERPL DL<=0.05 MIU/L-ACNC: 3.17 UIU/ML (ref 0.36–3.74)
WBC # BLD AUTO: 5.91 THOUSAND/UL (ref 4.31–10.16)

## 2019-09-19 PROCEDURE — 80053 COMPREHEN METABOLIC PANEL: CPT

## 2019-09-19 PROCEDURE — 84443 ASSAY THYROID STIM HORMONE: CPT

## 2019-09-19 PROCEDURE — 83036 HEMOGLOBIN GLYCOSYLATED A1C: CPT

## 2019-09-19 PROCEDURE — 36415 COLL VENOUS BLD VENIPUNCTURE: CPT

## 2019-09-19 PROCEDURE — 83735 ASSAY OF MAGNESIUM: CPT

## 2019-09-19 PROCEDURE — 85025 COMPLETE CBC W/AUTO DIFF WBC: CPT

## 2019-09-19 PROCEDURE — 84100 ASSAY OF PHOSPHORUS: CPT

## 2019-09-19 PROCEDURE — 84439 ASSAY OF FREE THYROXINE: CPT

## 2019-09-19 PROCEDURE — 82570 ASSAY OF URINE CREATININE: CPT

## 2019-09-19 PROCEDURE — 82043 UR ALBUMIN QUANTITATIVE: CPT

## 2019-10-02 ENCOUNTER — HOSPITAL ENCOUNTER (OUTPATIENT)
Dept: MRI IMAGING | Facility: HOSPITAL | Age: 84
Discharge: HOME | End: 2019-10-02
Attending: PSYCHIATRY & NEUROLOGY

## 2019-10-02 DIAGNOSIS — R25.1 TREMOR: ICD-10-CM

## 2019-10-02 DIAGNOSIS — R26.89 BALANCE DISORDER: ICD-10-CM

## 2019-10-03 ENCOUNTER — TELEPHONE (OUTPATIENT)
Dept: NEUROLOGY | Facility: CLINIC | Age: 84
End: 2019-10-03

## 2019-10-03 DIAGNOSIS — F40.240 CLAUSTROPHOBIA: Primary | ICD-10-CM

## 2019-10-03 NOTE — TELEPHONE ENCOUNTER
Patients wife called in to report patient attempted MRI yesterday  He was unable to complete due to severe claustrophobia and panic attack  MRI tech requested patient attempt MRI with anesthesia if necessary to attempt again  Please advise how you would like to proceed

## 2019-10-04 RX ORDER — DIAZEPAM 10 MG/1
TABLET ORAL
Qty: 2 TABLET | Refills: 0 | Status: SHIPPED | OUTPATIENT
Start: 2019-10-04 | End: 2019-10-06 | Stop reason: SDUPTHER

## 2019-10-04 NOTE — TELEPHONE ENCOUNTER
Diazepam 10 mg tab was provided for sedation- please relate to the patient  He will require help with driving on that day

## 2019-10-06 DIAGNOSIS — F40.240 CLAUSTROPHOBIA: ICD-10-CM

## 2019-10-06 RX ORDER — DIAZEPAM 10 MG/1
TABLET ORAL
Qty: 2 TABLET | Refills: 0 | Status: SHIPPED | OUTPATIENT
Start: 2019-10-06 | End: 2020-11-17 | Stop reason: ALTCHOICE

## 2019-10-06 NOTE — PROGRESS NOTES
Received a call from patient's wife, he is supposed to get a MRI tomorrow morning  A prescription for diazepam was supposed to be sent to [facility], checked records it was sent by Dr Leopold Bodo on Friday, pharmacy did nto receive it  Called in another prescription

## 2019-10-07 ENCOUNTER — HOSPITAL ENCOUNTER (OUTPATIENT)
Dept: RADIOLOGY | Age: 84
Discharge: HOME | End: 2019-10-07
Attending: PSYCHIATRY & NEUROLOGY
Payer: COMMERCIAL

## 2019-10-07 PROCEDURE — 70551 MRI BRAIN STEM W/O DYE: CPT

## 2019-10-31 ENCOUNTER — APPOINTMENT (OUTPATIENT)
Dept: LAB | Facility: CLINIC | Age: 84
End: 2019-10-31
Payer: COMMERCIAL

## 2019-10-31 ENCOUNTER — TRANSCRIBE ORDERS (OUTPATIENT)
Dept: LAB | Facility: CLINIC | Age: 84
End: 2019-10-31

## 2019-10-31 ENCOUNTER — OFFICE VISIT (OUTPATIENT)
Dept: INTERNAL MEDICINE CLINIC | Facility: CLINIC | Age: 84
End: 2019-10-31
Payer: COMMERCIAL

## 2019-10-31 VITALS
OXYGEN SATURATION: 98 % | HEART RATE: 51 BPM | HEIGHT: 70 IN | BODY MASS INDEX: 23.91 KG/M2 | DIASTOLIC BLOOD PRESSURE: 58 MMHG | TEMPERATURE: 97.1 F | WEIGHT: 167 LBS | SYSTOLIC BLOOD PRESSURE: 126 MMHG

## 2019-10-31 DIAGNOSIS — R29.898 RIGHT ARM WEAKNESS: ICD-10-CM

## 2019-10-31 DIAGNOSIS — Z79.4 TYPE 2 DIABETES MELLITUS WITH DIABETIC PERIPHERAL ANGIOPATHY WITHOUT GANGRENE, WITH LONG-TERM CURRENT USE OF INSULIN (HCC): ICD-10-CM

## 2019-10-31 DIAGNOSIS — I25.10 ARTERIOSCLEROTIC CARDIOVASCULAR DISEASE: ICD-10-CM

## 2019-10-31 DIAGNOSIS — N40.1 ENLARGED PROSTATE WITH URINARY OBSTRUCTION: ICD-10-CM

## 2019-10-31 DIAGNOSIS — N13.8 ENLARGED PROSTATE WITH URINARY OBSTRUCTION: Primary | ICD-10-CM

## 2019-10-31 DIAGNOSIS — Z23 NEED FOR INFLUENZA VACCINATION: ICD-10-CM

## 2019-10-31 DIAGNOSIS — N13.8 ENLARGED PROSTATE WITH URINARY OBSTRUCTION: ICD-10-CM

## 2019-10-31 DIAGNOSIS — N40.1 ENLARGED PROSTATE WITH URINARY OBSTRUCTION: Primary | ICD-10-CM

## 2019-10-31 DIAGNOSIS — R06.02 SHORTNESS OF BREATH: Primary | ICD-10-CM

## 2019-10-31 DIAGNOSIS — R25.1 TREMOR: ICD-10-CM

## 2019-10-31 DIAGNOSIS — R97.20 ELEVATED PROSTATE SPECIFIC ANTIGEN (PSA): ICD-10-CM

## 2019-10-31 DIAGNOSIS — E11.51 TYPE 2 DIABETES MELLITUS WITH DIABETIC PERIPHERAL ANGIOPATHY WITHOUT GANGRENE, WITH LONG-TERM CURRENT USE OF INSULIN (HCC): ICD-10-CM

## 2019-10-31 DIAGNOSIS — Z00.00 HEALTHCARE MAINTENANCE: ICD-10-CM

## 2019-10-31 DIAGNOSIS — I10 BENIGN ESSENTIAL HYPERTENSION: ICD-10-CM

## 2019-10-31 LAB
BUN SERPL-MCNC: 19 MG/DL (ref 5–25)
CREAT SERPL-MCNC: 1.38 MG/DL (ref 0.6–1.3)
GFR SERPL CREATININE-BSD FRML MDRD: 47 ML/MIN/1.73SQ M
PSA SERPL-MCNC: 7.6 NG/ML (ref 0–4)

## 2019-10-31 PROCEDURE — 1170F FXNL STATUS ASSESSED: CPT

## 2019-10-31 PROCEDURE — G0008 ADMIN INFLUENZA VIRUS VAC: HCPCS

## 2019-10-31 PROCEDURE — 99214 OFFICE O/P EST MOD 30 MIN: CPT | Performed by: INTERNAL MEDICINE

## 2019-10-31 PROCEDURE — 1125F AMNT PAIN NOTED PAIN PRSNT: CPT

## 2019-10-31 PROCEDURE — G0439 PPPS, SUBSEQ VISIT: HCPCS | Performed by: INTERNAL MEDICINE

## 2019-10-31 PROCEDURE — 36415 COLL VENOUS BLD VENIPUNCTURE: CPT

## 2019-10-31 PROCEDURE — 3078F DIAST BP <80 MM HG: CPT | Performed by: INTERNAL MEDICINE

## 2019-10-31 PROCEDURE — 3074F SYST BP LT 130 MM HG: CPT | Performed by: INTERNAL MEDICINE

## 2019-10-31 PROCEDURE — 84520 ASSAY OF UREA NITROGEN: CPT

## 2019-10-31 PROCEDURE — 84153 ASSAY OF PSA TOTAL: CPT

## 2019-10-31 PROCEDURE — 90662 IIV NO PRSV INCREASED AG IM: CPT

## 2019-10-31 PROCEDURE — 82565 ASSAY OF CREATININE: CPT

## 2019-10-31 NOTE — ASSESSMENT & PLAN NOTE
Patient is an 81-year-old male with a history of multiple medical problems who is here today for Medicare wellness visit  Patient did have labs performed prior to the visit today extensively as noted by his endocrinologist   We did discuss the results  His sugar showing good control and there is no significant abnormalities noted on exam   Patient continues to follow-up with Endocrinology, Cardiology, Urology and his to have a PSA performed in the near future  Patient states he still having problems with his possible Parkinson's disease but has not had definitive appointment by Urology for further evaluation although under went a MRI of the brain and he does have an appointment in the near future to discuss the results  He states with the Kristina levodopa he is having less difficulties with ambulation and clumsiness but still is having some problems with bowling and is upset because his average has gone down

## 2019-10-31 NOTE — PROGRESS NOTES
Assessment and Plan:     Problem List Items Addressed This Visit     None           Preventive health issues were discussed with patient, and age appropriate screening tests were ordered as noted in patient's After Visit Summary  Personalized health advice and appropriate referrals for health education or preventive services given if needed, as noted in patient's After Visit Summary  History of Present Illness:     Patient presents for Medicare Annual Wellness visit    Patient Care Team:  Jacquelin Friedman DO as PCP - General (Internal Medicine)     Problem List:     Patient Active Problem List   Diagnosis    Arteriosclerotic cardiovascular disease    Benign essential hypertension    Controlled insulin dependent diabetes mellitus ([facility] [address] )    Enlarged prostate without lower urinary tract symptoms (luts)    Hyperlipidemia    Dehydration    Coronary artery disease involving native coronary artery of native heart    Lactic acidosis    Fever    Dizziness    Other age-related cataract    Healthcare maintenance    Type 2 diabetes mellitus with diabetic peripheral 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 ([facility] [address] )    Peripheral vascular disease, unspecified ([facility] [address] )    Gastroenteritis    Blood in stool, mis    Tremor    Right arm weakness    Parkinson's disease ([facility] [address] )    Bradycardia      Past Medical and Surgical History:     Past Medical History:   Diagnosis Date    Diabetes mellitus ([facility] [address] )     Hypertension      Past Surgical History:   Procedure Laterality Date    CARDIAC SURGERY        Family History:     No family history on file     Social History:     Social History     Socioeconomic History    Marital status: /Civil Union     Spouse name: None    Number of children: None    Years of education: None    Highest education level: None   Occupational History    None   Social Needs    Financial resource strain: None    Food insecurity:     Worry: None     Inability: None    Transportation needs: Medical: None     Non-medical: None   Tobacco Use    Smoking status: Former Smoker     Types: Cigarettes    Smokeless tobacco: Never Used   Substance and Sexual Activity    Alcohol use: Yes     Comment: sparingly    Drug use: No    Sexual activity: None   Lifestyle    Physical activity:     Days per week: None     Minutes per session: None    Stress: None   Relationships    Social connections:     Talks on phone: None     Gets together: None     Attends Jainism service: None     Active member of club or organization: None     Attends meetings of clubs or organizations: None     Relationship status: None    Intimate partner violence:     Fear of current or ex partner: None     Emotionally abused: None     Physically abused: None     Forced sexual activity: None   Other Topics Concern    None   Social History Narrative    None       Medications and Allergies:     Current Outpatient Medications   Medication Sig Dispense Refill    aspirin (ASPIR-81) 81 mg EC tablet Take 1 tablet by mouth daily      carbidopa-levodopa (SINEMET)  mg per tablet Take 1 tablet by mouth 3 (three) times a day 90 tablet 5    clotrimazole-betamethasone (LOTRISONE) 1-0 05 % cream Apply topically 2 (two) times a day 30 g 4    diazepam (VALIUM) 10 mg tablet Take 1 tab 90 min prior to MRI with a second dose 40 min prior to imaging 2 tablet 0    insulin detemir (LEVEMIR) 100 units/mL subcutaneous injection Inject 27 Units under the skin daily at bedtime 15units Qam and 18units QHS      lisinopril (ZESTRIL) 10 mg tablet Take 10 mg by mouth daily        ONE TOUCH ULTRA TEST test strip       simvastatin (ZOCOR) 40 mg tablet Take 40 mg by mouth daily at bedtime         No current facility-administered medications for this visit  No Known Allergies   Immunizations: There is no immunization history on file for this patient  Health Maintenance: There are no preventive care reminders to display for this patient  Topic Date Due    HEPATITIS B VACCINES (1 of 3 - Risk 3-dose series) 01/20/1954    DTaP,Tdap,and Td Vaccines (1 - Tdap) 01/20/1957    Pneumococcal Vaccine: 65+ Years (1 of 2 - PCV13) 01/20/2001    INFLUENZA VACCINE  07/01/2019      Medicare Health Risk Assessment:     /58   Pulse (!) 51   Temp (!) 97 1 °F (36 2 °C)   Ht 5' 10" (1 778 m)   Wt 75 8 kg (167 lb)   SpO2 98%   BMI 23 96 kg/m²      Major Meigs is here for his Subsequent Wellness visit  Health Risk Assessment:   Patient rates overall health as good  Patient feels that their physical health rating is same  Eyesight was rated as same  Hearing was rated as same  Patient feels that their emotional and mental health rating is same  Pain experienced in the last 7 days has been none  Patient states that he has experienced no weight loss or gain in last 6 months  Depression Screening:   PHQ-2 Score: 0      Fall Risk Screening: In the past year, patient has experienced: no history of falling in past year      Home Safety:  Patient does not have trouble with stairs inside or outside of their home  Patient has working smoke alarms and has working carbon monoxide detector  Home safety hazards include: none  Nutrition:   Current diet is Regular and Frequent junk food  Medications:   Patient is currently taking over-the-counter supplements  OTC medications include: see medication list  Patient is able to manage medications  Activities of Daily Living (ADLs)/Instrumental Activities of Daily Living (IADLs):   Walk and transfer into and out of bed and chair?: Yes  Dress and groom yourself?: Yes    Bathe or shower yourself?: Yes    Feed yourself?  Yes  Do your laundry/housekeeping?: Yes  Manage your money, pay your bills and track your expenses?: Yes  Make your own meals?: Yes    Do your own shopping?: Yes    Previous Hospitalizations:   Any hospitalizations or ED visits within the last 12 months?: No      Advance Care Planning:   Living will: Yes    Durable POA for healthcare:  Yes    Advanced directive: Yes      PREVENTIVE SCREENINGS      Cardiovascular Screening:    General: Screening Not Indicated and History Lipid Disorder      Diabetes Screening:     General: Screening Not Indicated and History Diabetes      Prostate Cancer Screening:    General: Screening Not Indicated      Abdominal Aortic Aneurysm (AAA) Screening:    Risk factors include: tobacco use        Temple Jessie, DO

## 2019-10-31 NOTE — ASSESSMENT & PLAN NOTE
Lab Results   Component Value Date    HGBA1C 6 4 (H) 09/19/2019    Patient does have a longstanding history of diabetes  Is on insulin injections and his sugars are showing excellent control  Patient is having no episodes of hypoglycemia  Continues to follow-up with Endocrinology and adjustment of treatment as needed    Patient is up-to-date with diabetic foot exam diabetic eye exam

## 2019-10-31 NOTE — ASSESSMENT & PLAN NOTE
Patient does have an upcoming appointment to be seen by Cardiology  Patient denies any chest pain or pressure and no increasing shortness of breath  Patient will remain on present treatment unless there alterations by Cardiology    Further testing as indicated

## 2019-10-31 NOTE — ASSESSMENT & PLAN NOTE
Blood pressure is showing excellent control  Patient will continue present medication and treatment  Patient's renal function is stable

## 2019-10-31 NOTE — ASSESSMENT & PLAN NOTE
Tremor is much better control with low dose of Sinemet  Again we are waiting for definitive evaluation by Neurology

## 2019-10-31 NOTE — PROGRESS NOTES
Assessment/Plan:    Healthcare maintenance  Patient is an 31-year-old male with a history of multiple medical problems who is here today for Medicare wellness visit  Patient did have labs performed prior to the visit today extensively as noted by his endocrinologist   We did discuss the results  His sugar showing good control and there is no significant abnormalities noted on exam   Patient continues to follow-up with Endocrinology, Cardiology, Urology and his to have a PSA performed in the near future  Patient states he still having problems with his possible Parkinson's disease but has not had definitive appointment by Urology for further evaluation although under went a MRI of the brain and he does have an appointment in the near future to discuss the results  He states with the Kristina levodopa he is having less difficulties with ambulation and clumsiness but still is having some problems with bowling and is upset because his average has gone down  Tremor  Tremor is much better control with low dose of Sinemet  Again we are waiting for definitive evaluation by Neurology  Type 2 diabetes mellitus with diabetic peripheral 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 [facility])    Lab Results   Component Value Date    HGBA1C 6 4 (H) 09/19/2019    Patient does have a longstanding history of diabetes  Is on insulin injections and his sugars are showing excellent control  Patient is having no episodes of hypoglycemia  Continues to follow-up with Endocrinology and adjustment of treatment as needed  Patient is up-to-date with diabetic foot exam diabetic eye exam    Benign essential hypertension  Blood pressure is showing excellent control  Patient will continue present medication and treatment  Patient's renal function is stable  Arteriosclerotic cardiovascular disease  Patient does have an upcoming appointment to be seen by Cardiology  Patient denies any chest pain or pressure and no increasing shortness of breath  Patient will remain on present treatment unless there alterations by Cardiology  Further testing as indicated       Diagnoses and all orders for this visit:    Shortness of breath  -     XR chest pa & lateral; Future    Need for influenza vaccination  -     influenza vaccine, [id number], high-dose, PF 0 5 mL (FLUZONE HIGH-DOSE)    Type 2 diabetes mellitus with diabetic peripheral 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, with long-term current use of insulin ([address] )    Arteriosclerotic cardiovascular disease    Healthcare maintenance    Right arm weakness    Tremor    Benign essential hypertension          Subjective:      Patient ID: Angelique Medina  is a 81 y o  male  Patient is an 81-year-old male with a history of multiple medical problems as outlined previously  Patient is here today for repeat Medicare wellness visit  He did have extensive testing, lab testing done prior to the visit today and we did discuss the results  Patient continues to follow-up with his endocrinologist, cardiologist, urologist, has an upcoming appointment to be seen and re-evaluated by Neurology after having an MRI of the brain  States he is doing better with ambulation and less tremor with starting on Sinemet  No definitive diagnosis of Parkinson's disease at this point      The following portions of the patient's history were reviewed and updated as appropriate: He  has a past medical history of Diabetes mellitus ([address] ) and Hypertension    He   Patient Active Problem List    Diagnosis Date Noted    Shortness of breath 10/31/2019    Bradycardia 09/18/2019    Parkinson's disease ([address] ) 09/05/2019    Blood in stool, mis 08/13/2019    Tremor 08/13/2019    Right arm weakness 08/13/2019    Gastroenteritis 08/06/2019    Type 2 diabetes mellitus with diabetic peripheral 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 ([address] ) 03/27/2019    Peripheral vascular disease, unspecified ([facility] ) 03/27/2019    Healthcare maintenance 10/04/2018    Other age-related cataract 10/01/2018    Dehydration 08/23/2018    Coronary artery disease involving native coronary artery of native heart 08/23/2018    Lactic acidosis 08/23/2018    Fever 08/23/2018    Dizziness 08/23/2018    Arteriosclerotic cardiovascular disease 02/07/2013    Benign essential hypertension 02/07/2013    Controlled insulin dependent diabetes mellitus ([facility] ) 02/07/2013    Enlarged prostate without lower urinary tract symptoms (luts) 02/07/2013    Hyperlipidemia 02/07/2013     He  has a past surgical history that includes Cardiac surgery  His family history is not on file  He  reports that he has quit smoking  His smoking use included cigarettes  He has never used smokeless tobacco  He reports that he drinks alcohol  He reports that he does not use drugs  Current Outpatient Medications   Medication Sig Dispense Refill    aspirin (ASPIR-81) 81 mg EC tablet Take 1 tablet by mouth daily      carbidopa-levodopa (SINEMET)  mg per tablet Take 1 tablet by mouth 3 (three) times a day 90 tablet 5    clotrimazole-betamethasone (LOTRISONE) 1-0 05 % cream Apply topically 2 (two) times a day 30 g 4    diazepam (VALIUM) 10 mg tablet Take 1 tab 90 min prior to MRI with a second dose 40 min prior to imaging 2 tablet 0    insulin detemir (LEVEMIR) 100 units/mL subcutaneous injection Inject 27 Units under the skin daily at bedtime 15units Qam and 18units QHS      lisinopril (ZESTRIL) 10 mg tablet Take 10 mg by mouth daily        ONE TOUCH ULTRA TEST test strip       simvastatin (ZOCOR) 40 mg tablet Take 40 mg by mouth daily at bedtime         No current facility-administered medications for this visit        Current Outpatient Medications on File Prior to Visit   Medication Sig    aspirin (ASPIR-81) 81 mg EC tablet Take 1 tablet by mouth daily    carbidopa-levodopa (SINEMET)  mg per tablet Take 1 tablet by mouth 3 (three) times a day    clotrimazole-betamethasone (LOTRISONE) 1-0 05 % cream Apply topically 2 (two) times a day    diazepam (VALIUM) 10 mg tablet Take 1 tab 90 min prior to MRI with a second dose 40 min prior to imaging    insulin detemir (LEVEMIR) 100 units/mL subcutaneous injection Inject 27 Units under the skin daily at bedtime 15units Qam and 18units QHS    lisinopril (ZESTRIL) 10 mg tablet Take 10 mg by mouth daily      ONE TOUCH ULTRA TEST test strip     simvastatin (ZOCOR) 40 mg tablet Take 40 mg by mouth daily at bedtime       No current facility-administered medications on file prior to visit  He has No Known Allergies       Review of Systems   Constitutional: Negative  HENT: Negative  Eyes: Negative  Respiratory: Positive for shortness of breath (States recently he has had some mild increased shortness of breath  Is going for chest x-ray because of some also increase in chest congestion)  Negative for apnea, cough, choking, chest tightness, wheezing and stridor  Cardiovascular: Negative  Gastrointestinal: Negative  Endocrine: Negative  Genitourinary: Negative  Musculoskeletal: Positive for arthralgias  Negative for back pain, gait problem, joint swelling, myalgias, neck pain and neck stiffness  Skin: Negative  Allergic/Immunologic: Negative  Neurological: Positive for tremors and weakness  Negative for dizziness, seizures, syncope, facial asymmetry, speech difficulty, light-headedness, numbness and headaches  Hematological: Negative  Psychiatric/Behavioral: Negative  Objective:      /58   Pulse (!) 51   Temp (!) 97 1 °F (36 2 °C)   Ht 5' 10" (1 778 m)   Wt 75 8 kg (167 lb)   SpO2 98%   BMI 23 96 kg/m²          Physical Exam   Constitutional: He is oriented to person, place, and time  He appears well-developed and well-nourished  No distress  Pleasant 27-year-old male who is awake alert no acute distress and oriented x3   HENT:   Head: Normocephalic and atraumatic     Right Ear: External ear normal    Left Ear: External ear normal    Nose: Nose normal    Mouth/Throat: No oropharyngeal exudate  Oral mucous membranes are slightly dry but pink   Eyes: Pupils are equal, round, and reactive to light  Conjunctivae and EOM are normal  Right eye exhibits no discharge  Left eye exhibits no discharge  No scleral icterus  Neck: Normal range of motion  Neck supple  No JVD present  No tracheal deviation present  No thyromegaly present  Cardiovascular: Regular rhythm and intact distal pulses  Exam reveals no gallop and no friction rub  Murmur heard  Patient on examination has a sinus bradycardia with no ectopy noted on auscultation   Pulmonary/Chest: Effort normal  No stridor  No respiratory distress  He has no wheezes  He has no rales  He exhibits no tenderness  Slightly decreased breath sounds anteriorly and posteriorly but no rales rhonchi or wheezes could be appreciated  Some rhonchi were heard but these cleared with cough   Abdominal: Soft  Bowel sounds are normal  He exhibits no distension and no mass  There is no tenderness  There is no rebound and no guarding  No hernia  Genitourinary:   Genitourinary Comments: Defers prostate exam, rectal exam, his upcoming appointment to be seen by Urology   Musculoskeletal: Normal range of motion  He exhibits deformity  He exhibits no edema or tenderness  Lymphadenopathy:     He has no cervical adenopathy  Neurological: He is alert and oriented to person, place, and time  He displays abnormal reflex ( absent patella and Achilles tendon reflexes bilaterally)  A sensory deficit is present  No cranial nerve deficit  He exhibits abnormal muscle tone ( some generalized decreased muscle tone and strength to both upper lower extremities)  Coordination ( patient is still complaining of some problems with coordination with his right arm but this is improved ) abnormal    Skin: Skin is warm and dry  No rash noted  He is not diaphoretic  No erythema  No pallor     Psychiatric: He has a normal mood and affect  His behavior is normal  Judgment and thought content normal    Nursing note and vitals reviewed

## 2019-11-18 ENCOUNTER — TELEPHONE (OUTPATIENT)
Dept: NEUROLOGY | Facility: CLINIC | Age: 84
End: 2019-11-18

## 2019-11-21 ENCOUNTER — OFFICE VISIT (OUTPATIENT)
Dept: NEUROLOGY | Facility: CLINIC | Age: 84
End: 2019-11-21
Payer: COMMERCIAL

## 2019-11-21 VITALS
HEIGHT: 70 IN | DIASTOLIC BLOOD PRESSURE: 50 MMHG | HEART RATE: 57 BPM | SYSTOLIC BLOOD PRESSURE: 100 MMHG | BODY MASS INDEX: 23.48 KG/M2 | WEIGHT: 164 LBS

## 2019-11-21 DIAGNOSIS — G20 PARKINSON'S DISEASE (HCC): Primary | ICD-10-CM

## 2019-11-21 DIAGNOSIS — G25.3 MYOCLONUS: ICD-10-CM

## 2019-11-21 PROCEDURE — 99215 OFFICE O/P EST HI 40 MIN: CPT | Performed by: PSYCHIATRY & NEUROLOGY

## 2019-11-21 NOTE — PATIENT INSTRUCTIONS
Keep going with the medication as is  I'm happy its helping  You can try moving the doses closer together (every 5 hours) if you'd like

## 2019-11-21 NOTE — PROGRESS NOTES
Assessment/Plan:    Parkinson's disease ([facility] )  Davee Mcardle is an 81year-old right-handed man with CAD, HTN, DM, PVD, HLD, and bradycardia who presents for evaluation bilateral hand tremor, symptom onset around 2018 with significant improvement per his wife with the initiation carbidopa/levodopa  The patient's physical exam is interesting with right-sided myoclonus of the arm and leg and minimal if any parkinsonism on exam   This may be because his parkinsonism is well treated with the Sinemet  Would have been useful to see him off of medications prior to initiation  No dystonia or apraxia noted  CBD remains on the differential however less likely given his robust response to sinemet  It is also unusual that the patient has none of the typical non motor symptoms seen in Parkinson's disease  I reviewed the patient's MRI which does show some mid brain atrophy in addition to generalized parenchymal atrophy  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   Will repeat his MRI scan in a year  - continue sinemet TID  Will switch to 25/100 when refilling   - Repeat MRI in 1 year to assess right parietal atrophy     Not interested in PT or our mailing list       Diagnoses and all orders for this visit:    Parkinson's disease ([facility] )    Myoclonus    Total time spent today 50 minutes  Greater than 50% of total time was spent with the patient and / or family counseling and / or coordination of care      Subjective:     Patient ID: Wanda Hartman  is a 81 y o  male  I had the pleasure of seeing your patient, Wanda Hartman  in the Movement Disorders Clinic at the [facility]  The patient is a 81 y o  right handed male who presents for tremor  History is primarily provided by the patient's wife  She reports that his tremor began about 1 year ago in the bilateral hands    It is gotten progressively worse with time and then spread to the legs  It had gotten to the point that he was having difficulty using his fork and difficulty performing his favor past time of bowling  He was started on carbidopa/levodopa 10/100 by his primary care provider and his symptoms improved dramatically  His wife reports that his tremor is almost completely gone now he is able to get back to a bowling  The patient's wife, when asked specifically, noticed emergence jerk like movements in the right arm and leg also starting about a year ago  He was evaluated by Dr Alejandro Johnson who recommended an MRI scan  He denies any neuroleptics exposure  No family history of neurologic disease including Parkinson's disease or essential tremor  Previous medication trials:   Sinemet: obvious benefit in tremor control  Regarding motor symptoms:   Tremor: bl hands and legs   Slowness: not bad   Stiffness: not really   Dystonia: no   Changes in facial expression: no   Changes in voice: no   Changes in writing: no worse, always bad   Changes in gait: right arm can be "jerky"  Falls: no   Freezing: no   Trouble with swallowing: no   Clumsiness/dexterity: mild     Regarding non-motor symptoms:   Anosmia: no   Constipation: no   Urinary sx: DM, BPH  Lightheadedness: sometimes, mild   Changes in Mood: no issues   Trouble with sleep: 3-4x nocturia      REM behavior Disorder: probably  Bed is all a mess when he wakes up  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 issues  Repair/carpentry    Hallucinations: no     Regarding medication complication:   Wearing off: 5 hours about     Dyskinesias: no       The following portions of the patient's history were reviewed and updated as appropriate: allergies, current medications, past family history, past medical history, past social history, past surgical history and problem list       Objective:  /50 (BP Location: Right arm, Patient Position: Standing, Cuff Size: Standard)   Pulse 57   Ht 5' 10" (1 778 m)   Wt 74 4 kg (164 lb)   BMI 23 53 kg/m²     Physical Exam    Neurological Exam    GENERAL MEDICAL EXAMINATION:  General appearance: alert, in no apparent distress  Appropriately dressed and groomed  Conversing and interacting appropriately  Eyes: Sclera are non-injected  Ears, nose, Mouth, Throat: Mucous membranes are moist    Resp: Breathing comfortably on RA   Musculoskeletal: No evidence of deformities  No contractures  No Edema  Skin: No visible rashes  Warm and well perfused  Psych: normal and appropriate affect     Mental Status:  Alert and oriented to person place and time  Able to relate history without difficulty  Attentive to conversation  Language is fluent and appropriate with normal prosody  There were no paraphasic errors  Speech was not dysarthric  Able to follow both midline and appendicular commands       Right sided myoclonus   Some tongue movements, dentures   Somewhat irregular tremor verses frequent myoclonus of the right leg  Copies hand postures well   No evidence of dystonia   No stimulus induced myoclonus     UPDRS motor:                              Time since last dose:  5     Speech  0     Facial Expression  1     Rigidity - Neck  0     Rigidity - Upper Extremity (Right)  0     Rigidity - Upper Extremity (Left)   0     Rigidity - Lower Extremity (Right)  0     Rigidity - Lower Extremity (Left)   0     Finger Taps (Right)   0     Finger Taps (Left)   0     Hand Movement (Right)  0     Hand Movement (Left)   0     Pronation/Supination (Right)  0     Pronation/Supination (Left)   0     Toe Tapping (Right) 1     Toe Tapping (Left) 0     Leg Agility (Right)  0     Leg Agility (Left)   0     Arising from Chair   0     Gait   0     Freezing of Gait 0     Postural Stability        Posture 0     Global spontaneity of movement 0     Postural Tremor (Right) 0     Postural Tremor (Left) 0     Kinetic Tremor (Right)  0     Kinetic Tremor (Left)  0     Rest tremor amplitude RUE 0     Rest tremor amplitude LUE 0     Rest tremor amplitude RLE 0     Reset tremor amplitude LLE 0     Lip/Jaw Tremor  0     Consistency of tremor 0     Motor Exam Total:          Reduced arm swing on the left  (old left shoulder injury)     deminished reflexes throughout  Review of Systems   Constitutional: Positive for fatigue  Negative for appetite change and fever  HENT: Negative  Negative for hearing loss, tinnitus, trouble swallowing and voice change  Eyes: Negative  Negative for photophobia and pain  Respiratory: Negative  Negative for shortness of breath  Cardiovascular: Negative  Negative for palpitations  Gastrointestinal: Negative  Negative for nausea and vomiting  Endocrine: Negative  Negative for cold intolerance and heat intolerance  Genitourinary: Positive for frequency and urgency  Negative for dysuria  Musculoskeletal: Negative  Negative for myalgias and neck pain  Skin: Negative  Negative for rash  Neurological: Positive for dizziness, tremors (bilateral hands and legs) and light-headedness  Negative for seizures, syncope, facial asymmetry, speech difficulty, weakness, numbness and headaches  Hematological: Negative  Does not bruise/bleed easily  Psychiatric/Behavioral: Negative  Negative for confusion, hallucinations and sleep disturbance  The above ROS was reviewed and updated       Milo Black MD  Medical Director   [facility]  Movement and Memory Specialist       Current Outpatient Medications on File Prior to Visit   Medication Sig Dispense Refill    aspirin (ASPIR-81) 81 mg EC tablet Take 1 tablet by mouth daily      carbidopa-levodopa (SINEMET)  mg per tablet Take 1 tablet by mouth 3 (three) times a day 90 tablet 5    clotrimazole-betamethasone (LOTRISONE) 1-0 05 % cream Apply topically 2 (two) times a day 30 g 4    diazepam (VALIUM) 10 mg tablet Take 1 tab 90 min prior to MRI with a second dose 40 min prior to imaging 2 tablet 0    insulin detemir (LEVEMIR) 100 units/mL subcutaneous injection Inject 27 Units under the skin daily at bedtime 15units Qam and 18units QHS      lisinopril (ZESTRIL) 10 mg tablet Take 10 mg by mouth daily        ONE TOUCH ULTRA TEST test strip       simvastatin (ZOCOR) 40 mg tablet Take 40 mg by mouth daily at bedtime         No current facility-administered medications on file prior to visit

## 2019-11-21 NOTE — ASSESSMENT & PLAN NOTE
Lo Timmons is an 81year-old right-handed man with CAD, HTN, DM, PVD, HLD, and bradycardia who presents for evaluation bilateral hand tremor, symptom onset around 2018 with significant improvement per his wife with the initiation carbidopa/levodopa  The patient's physical exam is interesting with right-sided myoclonus of the arm and leg and minimal if any parkinsonism on exam   This may be because his parkinsonism is well treated with the Sinemet  Would have been useful to see him off of medications prior to initiation  No dystonia or apraxia noted  CBD remains on the differential however less likely given his robust response to sinemet  It is also unusual that the patient has none of the typical non motor symptoms seen in Parkinson's disease  I reviewed the patient's MRI which does show some mid brain atrophy in addition to generalized parenchymal atrophy  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

## 2019-11-26 ENCOUNTER — APPOINTMENT (OUTPATIENT)
Dept: RADIOLOGY | Age: 84
End: 2019-11-26
Payer: COMMERCIAL

## 2019-11-26 DIAGNOSIS — R06.02 SHORTNESS OF BREATH: ICD-10-CM

## 2019-11-26 PROCEDURE — 71046 X-RAY EXAM CHEST 2 VIEWS: CPT

## 2020-02-06 DIAGNOSIS — G20 PARKINSON'S DISEASE (HCC): Primary | ICD-10-CM

## 2020-02-06 NOTE — TELEPHONE ENCOUNTER
Patient calling regarding carbidopa-levodopa 10-100mg medication  Per office note, patient to switch to carbidopa-levodopa 25-100mg TID when refilling  I have queued this up for you if agreeable  Please review/sign      TY

## 2020-02-27 ENCOUNTER — OFFICE VISIT (OUTPATIENT)
Dept: INTERNAL MEDICINE CLINIC | Facility: CLINIC | Age: 85
End: 2020-02-27
Payer: COMMERCIAL

## 2020-02-27 VITALS
HEIGHT: 70 IN | TEMPERATURE: 97.3 F | BODY MASS INDEX: 24.48 KG/M2 | DIASTOLIC BLOOD PRESSURE: 64 MMHG | HEART RATE: 48 BPM | OXYGEN SATURATION: 98 % | WEIGHT: 171 LBS | SYSTOLIC BLOOD PRESSURE: 130 MMHG

## 2020-02-27 DIAGNOSIS — N40.0 ENLARGED PROSTATE WITHOUT LOWER URINARY TRACT SYMPTOMS (LUTS): ICD-10-CM

## 2020-02-27 DIAGNOSIS — G20 PARKINSON'S DISEASE (HCC): ICD-10-CM

## 2020-02-27 DIAGNOSIS — I10 BENIGN ESSENTIAL HYPERTENSION: ICD-10-CM

## 2020-02-27 DIAGNOSIS — IMO0001 CONTROLLED INSULIN DEPENDENT DIABETES MELLITUS: Primary | ICD-10-CM

## 2020-02-27 DIAGNOSIS — R00.1 BRADYCARDIA: ICD-10-CM

## 2020-02-27 DIAGNOSIS — E78.00 PURE HYPERCHOLESTEROLEMIA: ICD-10-CM

## 2020-02-27 PROCEDURE — 3078F DIAST BP <80 MM HG: CPT | Performed by: INTERNAL MEDICINE

## 2020-02-27 PROCEDURE — 3008F BODY MASS INDEX DOCD: CPT | Performed by: INTERNAL MEDICINE

## 2020-02-27 PROCEDURE — 3075F SYST BP GE 130 - 139MM HG: CPT | Performed by: INTERNAL MEDICINE

## 2020-02-27 PROCEDURE — 99214 OFFICE O/P EST MOD 30 MIN: CPT | Performed by: INTERNAL MEDICINE

## 2020-02-27 PROCEDURE — 1160F RVW MEDS BY RX/DR IN RCRD: CPT | Performed by: INTERNAL MEDICINE

## 2020-02-27 PROCEDURE — 1036F TOBACCO NON-USER: CPT | Performed by: INTERNAL MEDICINE

## 2020-02-27 NOTE — ASSESSMENT & PLAN NOTE
Patient does have a history of coronary artery disease  He remains on simvastatin 40 mg a day  Again patient admits that he is not always perfect with his diet  We will continue to monitor his cholesterol profile  He is encouraged to watches diet especially the intake of fats and cholesterol with his diet

## 2020-02-27 NOTE — ASSESSMENT & PLAN NOTE
Patient chest had a recent increase in dosage of his cover levodopa and states there has been a dramatic improvement in his function  He continues to follow-up with Neurology

## 2020-02-27 NOTE — PROGRESS NOTES
1st attempt, unable to LM.  Sent [employer] message asking patient what she needs.  Need specific list of which specialties and for what reason.    For Paxil, we can either send a PA request or she should call her insurance to see which alternative would be covered, or at which pharmacy.  Will await response.    Saurabh Brizuela CMA ([facility])     Assessment/Plan:    Controlled insulin dependent diabetes mellitus ([facility] [address] )  As noted patient continues to show excellent control of his diabetes and he continues to follow-up with his endocrinologist   Patient admits that he is not always perfect with his diet but he is trying to stay physically active and with adjustments the been made with his dosage of carbo levodopa for his Parkinson's disease he feels he is much more physically active  Patient does have an upcoming visit with Endocrinology and they continue as noted to follow his blood sugars, he is up-to-date with evaluations as far as diabetic eye exam, diabetic foot exam  Lab Results   Component Value Date    HGBA1C 6 4 (H) 09/19/2019       Benign essential hypertension  Patient's blood pressure is still showing excellent control  Patient will continue present medication and surveillance  He is on lisinopril at 10 mg a day  Parkinson's disease [facility])  Patient chest had a recent increase in dosage of his cover levodopa and states there has been a dramatic improvement in his function  He continues to follow-up with Neurology  Enlarged prostate without lower urinary tract symptoms (luts)  History of BPH  His urologist despite is aged continues to follow his PSA which is elevated  He is not undergoing any type of further evaluation or treatment  No change in his urologic symptoms  Bradycardia  Despite his heart rate being low patient is asymptomatic with no lightheadedness or dizziness  He will continue to follow up with Cardiology history of coronary artery disease in the past    Hyperlipidemia  Patient does have a history of coronary artery disease  He remains on simvastatin 40 mg a day  Again patient admits that he is not always perfect with his diet  We will continue to monitor his cholesterol profile  He is encouraged to watches diet especially the intake of fats and cholesterol with his diet         Diagnoses and all orders for this visit:    Controlled insulin dependent diabetes mellitus ([facility] [address] )    Benign essential hypertension    Parkinson's disease ([facility] [address] )    Enlarged prostate without lower urinary tract symptoms (luts)    Bradycardia    Pure hypercholesterolemia          Subjective:      Patient ID: Jem Brunson  is a 81 y o  male  Patient is an 55-year-old male with a history of hypertension, hyperlipidemia, insulin-dependent diabetes mellitus type 2, recently diagnosed within the last year with Parkinson's disease  Patient is here today for routine follow-up  He continues to follow-up with his specialists including his neurologist, endocrinologist   Recent increased dose of carbo levodopa 0 which she states has been helping him with activity and ambulation  He is pleased with the results      The following portions of the patient's history were reviewed and updated as appropriate: He  has a past medical history of Diabetes mellitus ([facility] [address] ) and Hypertension    He   Patient Active Problem List    Diagnosis Date Noted    Shortness of breath 10/31/2019    Bradycardia 09/18/2019    Parkinson's disease ([facility] [address] ) 09/05/2019    Blood in stool, mis 08/13/2019    Tremor 08/13/2019    Right arm weakness 08/13/2019    Gastroenteritis 08/06/2019    Type 2 diabetes mellitus with diabetic peripheral 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 ([facility] [address] ) 03/27/2019    Peripheral vascular disease, unspecified ([facility] ) 03/27/2019    Healthcare maintenance 10/04/2018    Other age-related cataract 10/01/2018    Dehydration 08/23/2018    Coronary artery disease involving native coronary artery of native heart 08/23/2018    Lactic acidosis 08/23/2018    Fever 08/23/2018    Dizziness 08/23/2018    Arteriosclerotic cardiovascular disease 02/07/2013    Benign essential hypertension 02/07/2013    Controlled insulin dependent diabetes mellitus ([address] ) 02/07/2013    Enlarged prostate without lower urinary tract symptoms (luts) 02/07/2013    Hyperlipidemia 02/07/2013 He  has a past surgical history that includes Cardiac surgery  His family history includes Diabetes in his mother; Heart attack in his mother; Hypertension in his father; Stroke in his father  He  reports that he has quit smoking  His smoking use included cigarettes  He quit after 45 00 years of use  He has never used smokeless tobacco  He reports that he drinks alcohol  He reports that he does not use drugs  Current Outpatient Medications   Medication Sig Dispense Refill    aspirin (ASPIR-81) 81 mg EC tablet Take 1 tablet by mouth daily      carbidopa-levodopa (SINEMET)  mg per tablet Take 1 tablet by mouth 3 (three) times a day 90 tablet 5    clotrimazole-betamethasone (LOTRISONE) 1-0 05 % cream Apply topically 2 (two) times a day 30 g 4    diazepam (VALIUM) 10 mg tablet Take 1 tab 90 min prior to MRI with a second dose 40 min prior to imaging 2 tablet 0    insulin detemir (LEVEMIR) 100 units/mL subcutaneous injection Inject 27 Units under the skin daily at bedtime 15units Qam and 18units QHS      lisinopril (ZESTRIL) 10 mg tablet Take 10 mg by mouth daily        ONE TOUCH ULTRA TEST test strip       simvastatin (ZOCOR) 40 mg tablet Take 40 mg by mouth daily at bedtime         No current facility-administered medications for this visit        Current Outpatient Medications on File Prior to Visit   Medication Sig    aspirin (ASPIR-81) 81 mg EC tablet Take 1 tablet by mouth daily    carbidopa-levodopa (SINEMET)  mg per tablet Take 1 tablet by mouth 3 (three) times a day    clotrimazole-betamethasone (LOTRISONE) 1-0 05 % cream Apply topically 2 (two) times a day    diazepam (VALIUM) 10 mg tablet Take 1 tab 90 min prior to MRI with a second dose 40 min prior to imaging    insulin detemir (LEVEMIR) 100 units/mL subcutaneous injection Inject 27 Units under the skin daily at bedtime 15units Qam and 18units QHS    lisinopril (ZESTRIL) 10 mg tablet Take 10 mg by mouth daily      ONE TOUCH ULTRA TEST test strip     simvastatin (ZOCOR) 40 mg tablet Take 40 mg by mouth daily at bedtime       No current facility-administered medications on file prior to visit  He has No Known Allergies       Review of Systems   Constitutional: Positive for activity change (States that with the increased dose of Sinemet he is more physically active)  Negative for appetite change, chills, diaphoresis, fatigue, fever and unexpected weight change  HENT: Negative  Eyes: Negative  Respiratory: Negative  Cardiovascular: Negative  Gastrointestinal: Negative  Endocrine: Negative  Genitourinary: Negative  Musculoskeletal: Negative  Skin: Negative  Allergic/Immunologic: Negative  Neurological: Positive for tremors ( states there has been some improvement with less tremor with increased dose of Sinemet)  Negative for dizziness, seizures, syncope, facial asymmetry, speech difficulty, weakness, light-headedness, numbness and headaches  Hematological: Negative  Psychiatric/Behavioral: Negative  Objective:      /64   Pulse (!) 48   Temp (!) 97 3 °F (36 3 °C)   Ht 5' 10" (1 778 m)   Wt 77 6 kg (171 lb)   SpO2 98%   BMI 24 54 kg/m²          Physical Exam   Constitutional: He is oriented to person, place, and time  He appears well-developed and well-nourished  No distress  Pleasant, cheerful 20-year-old male who is awake alert no acute distress and oriented x3   HENT:   Head: Normocephalic and atraumatic  Right Ear: External ear normal    Left Ear: External ear normal    Nose: Nose normal    Mouth/Throat: Oropharynx is clear and moist  No oropharyngeal exudate  Eyes: Pupils are equal, round, and reactive to light  Conjunctivae and EOM are normal  Right eye exhibits no discharge  Left eye exhibits no discharge  No scleral icterus  Neck: Normal range of motion  Neck supple  No JVD present  No tracheal deviation present  No thyromegaly present     Cardiovascular: Regular rhythm, normal heart sounds and intact distal pulses  Exam reveals no gallop and no friction rub  No murmur heard  Pulmonary/Chest: Effort normal and breath sounds normal  No stridor  No respiratory distress  He has no wheezes  He has no rales  He exhibits no tenderness  Abdominal: Soft  Bowel sounds are normal  He exhibits no distension and no mass  There is no tenderness  There is no rebound and no guarding  No hernia  Musculoskeletal: Normal range of motion  He exhibits deformity  He exhibits no edema or tenderness  Some diffuse arthritic changes noted to the hands and digits, flattening to his lumbar curve, no acute lesions on exam today   Lymphadenopathy:     He has no cervical adenopathy  Neurological: He is alert and oriented to person, place, and time  He displays normal reflexes  No cranial nerve deficit or sensory deficit  He exhibits normal muscle tone  Coordination normal    Patient does have some dramatic improvement in his neurologic status with the increased dose of Sinemet  Complete absence of tremor today  No rigidity and no cogwheeling  Gait is normalized   Skin: Skin is warm and dry  Capillary refill takes less than 2 seconds  No rash noted  He is not diaphoretic  No erythema  No pallor  Psychiatric: He has a normal mood and affect  His behavior is normal  Judgment and thought content normal    Nursing note and vitals reviewed

## 2020-02-27 NOTE — ASSESSMENT & PLAN NOTE
Despite his heart rate being low patient is asymptomatic with no lightheadedness or dizziness    He will continue to follow up with Cardiology history of coronary artery disease in the past

## 2020-02-27 NOTE — ASSESSMENT & PLAN NOTE
As noted patient continues to show excellent control of his diabetes and he continues to follow-up with his endocrinologist   Patient admits that he is not always perfect with his diet but he is trying to stay physically active and with adjustments the been made with his dosage of carbo levodopa for his Parkinson's disease he feels he is much more physically active    Patient does have an upcoming visit with Endocrinology and they continue as noted to follow his blood sugars, he is up-to-date with evaluations as far as diabetic eye exam, diabetic foot exam  Lab Results   Component Value Date    HGBA1C 6 4 (H) 09/19/2019

## 2020-02-27 NOTE — ASSESSMENT & PLAN NOTE
Patient's blood pressure is still showing excellent control  Patient will continue present medication and surveillance  He is on lisinopril at 10 mg a day

## 2020-02-27 NOTE — ASSESSMENT & PLAN NOTE
History of BPH  His urologist despite is aged continues to follow his PSA which is elevated  He is not undergoing any type of further evaluation or treatment  No change in his urologic symptoms

## 2020-03-18 ENCOUNTER — TELEPHONE (OUTPATIENT)
Dept: NEUROLOGY | Facility: CLINIC | Age: 85
End: 2020-03-18

## 2020-03-18 NOTE — TELEPHONE ENCOUNTER
LMOM in regards to pts upcoming appt  Seeing if patient would like to keep appt or would be interested in alternative appts  Due to patient being at high risk for COVID-19   Will await pts c/b

## 2020-03-19 NOTE — TELEPHONE ENCOUNTER
Returned patient's wife's call, Kaushik Barker, due to the covid-19 situation patient would like to be r/s  I r/s appt to Mamie

## 2020-05-27 ENCOUNTER — APPOINTMENT (OUTPATIENT)
Dept: LAB | Facility: CLINIC | Age: 85
End: 2020-05-27
Payer: COMMERCIAL

## 2020-05-27 ENCOUNTER — TRANSCRIBE ORDERS (OUTPATIENT)
Dept: LAB | Facility: CLINIC | Age: 85
End: 2020-05-27

## 2020-05-27 DIAGNOSIS — E11.649 UNCONTROLLED TYPE 2 DIABETES MELLITUS WITH HYPOGLYCEMIA, UNSPECIFIED HYPOGLYCEMIA COMA STATUS (HCC): ICD-10-CM

## 2020-05-27 DIAGNOSIS — E11.649 UNCONTROLLED TYPE 2 DIABETES MELLITUS WITH HYPOGLYCEMIA, UNSPECIFIED HYPOGLYCEMIA COMA STATUS (HCC): Primary | ICD-10-CM

## 2020-05-27 DIAGNOSIS — E66.8 OBESITY OF ENDOCRINE ORIGIN: ICD-10-CM

## 2020-05-27 LAB
ALBUMIN SERPL BCP-MCNC: 3.9 G/DL (ref 3.5–5)
ALP SERPL-CCNC: 68 U/L (ref 46–116)
ALT SERPL W P-5'-P-CCNC: 11 U/L (ref 12–78)
ANION GAP SERPL CALCULATED.3IONS-SCNC: 4 MMOL/L (ref 4–13)
AST SERPL W P-5'-P-CCNC: 22 U/L (ref 5–45)
BACTERIA UR QL AUTO: NORMAL /HPF
BASOPHILS # BLD AUTO: 0.04 THOUSANDS/ΜL (ref 0–0.1)
BASOPHILS NFR BLD AUTO: 1 % (ref 0–1)
BILIRUB SERPL-MCNC: 0.84 MG/DL (ref 0.2–1)
BILIRUB UR QL STRIP: NEGATIVE
BUN SERPL-MCNC: 21 MG/DL (ref 5–25)
CALCIUM SERPL-MCNC: 9.3 MG/DL (ref 8.3–10.1)
CHLORIDE SERPL-SCNC: 108 MMOL/L (ref 100–108)
CLARITY UR: CLEAR
CO2 SERPL-SCNC: 27 MMOL/L (ref 21–32)
COLOR UR: YELLOW
CREAT SERPL-MCNC: 1.43 MG/DL (ref 0.6–1.3)
CREAT UR-MCNC: 122 MG/DL
EOSINOPHIL # BLD AUTO: 0.24 THOUSAND/ΜL (ref 0–0.61)
EOSINOPHIL NFR BLD AUTO: 4 % (ref 0–6)
ERYTHROCYTE [DISTWIDTH] IN BLOOD BY AUTOMATED COUNT: 13.7 % (ref 11.6–15.1)
EST. AVERAGE GLUCOSE BLD GHB EST-MCNC: 140 MG/DL
GFR SERPL CREATININE-BSD FRML MDRD: 45 ML/MIN/1.73SQ M
GLUCOSE P FAST SERPL-MCNC: 138 MG/DL (ref 65–99)
GLUCOSE UR STRIP-MCNC: NEGATIVE MG/DL
HBA1C MFR BLD: 6.5 %
HCT VFR BLD AUTO: 45.4 % (ref 36.5–49.3)
HGB BLD-MCNC: 15 G/DL (ref 12–17)
HGB UR QL STRIP.AUTO: NEGATIVE
HYALINE CASTS #/AREA URNS LPF: NORMAL /LPF
IMM GRANULOCYTES # BLD AUTO: 0.02 THOUSAND/UL (ref 0–0.2)
IMM GRANULOCYTES NFR BLD AUTO: 0 % (ref 0–2)
KETONES UR STRIP-MCNC: NEGATIVE MG/DL
LEUKOCYTE ESTERASE UR QL STRIP: NEGATIVE
LYMPHOCYTES # BLD AUTO: 1.63 THOUSANDS/ΜL (ref 0.6–4.47)
LYMPHOCYTES NFR BLD AUTO: 26 % (ref 14–44)
MAGNESIUM SERPL-MCNC: 2.3 MG/DL (ref 1.6–2.6)
MCH RBC QN AUTO: 30.8 PG (ref 26.8–34.3)
MCHC RBC AUTO-ENTMCNC: 33 G/DL (ref 31.4–37.4)
MCV RBC AUTO: 93 FL (ref 82–98)
MICROALBUMIN UR-MCNC: 19.5 MG/L (ref 0–20)
MICROALBUMIN/CREAT 24H UR: 16 MG/G CREATININE (ref 0–30)
MONOCYTES # BLD AUTO: 0.43 THOUSAND/ΜL (ref 0.17–1.22)
MONOCYTES NFR BLD AUTO: 7 % (ref 4–12)
NEUTROPHILS # BLD AUTO: 4.02 THOUSANDS/ΜL (ref 1.85–7.62)
NEUTS SEG NFR BLD AUTO: 62 % (ref 43–75)
NITRITE UR QL STRIP: NEGATIVE
NON-SQ EPI CELLS URNS QL MICRO: NORMAL /HPF
NRBC BLD AUTO-RTO: 0 /100 WBCS
PH UR STRIP.AUTO: 5.5 [PH]
PHOSPHATE SERPL-MCNC: 3.5 MG/DL (ref 2.3–4.1)
PLATELET # BLD AUTO: 169 THOUSANDS/UL (ref 149–390)
PMV BLD AUTO: 11 FL (ref 8.9–12.7)
POTASSIUM SERPL-SCNC: 4.7 MMOL/L (ref 3.5–5.3)
PROT SERPL-MCNC: 7.4 G/DL (ref 6.4–8.2)
PROT UR STRIP-MCNC: NEGATIVE MG/DL
RBC # BLD AUTO: 4.87 MILLION/UL (ref 3.88–5.62)
RBC #/AREA URNS AUTO: NORMAL /HPF
SODIUM SERPL-SCNC: 139 MMOL/L (ref 136–145)
SP GR UR STRIP.AUTO: 1.02 (ref 1–1.03)
T4 FREE SERPL-MCNC: 0.9 NG/DL (ref 0.76–1.46)
TSH SERPL DL<=0.05 MIU/L-ACNC: 4.36 UIU/ML (ref 0.36–3.74)
UROBILINOGEN UR QL STRIP.AUTO: 0.2 E.U./DL
WBC # BLD AUTO: 6.38 THOUSAND/UL (ref 4.31–10.16)
WBC #/AREA URNS AUTO: NORMAL /HPF

## 2020-05-27 PROCEDURE — 82525 ASSAY OF COPPER: CPT

## 2020-05-27 PROCEDURE — 80053 COMPREHEN METABOLIC PANEL: CPT

## 2020-05-27 PROCEDURE — 83735 ASSAY OF MAGNESIUM: CPT

## 2020-05-27 PROCEDURE — 85025 COMPLETE CBC W/AUTO DIFF WBC: CPT

## 2020-05-27 PROCEDURE — 36415 COLL VENOUS BLD VENIPUNCTURE: CPT

## 2020-05-27 PROCEDURE — 83036 HEMOGLOBIN GLYCOSYLATED A1C: CPT

## 2020-05-27 PROCEDURE — 82043 UR ALBUMIN QUANTITATIVE: CPT

## 2020-05-27 PROCEDURE — 84439 ASSAY OF FREE THYROXINE: CPT

## 2020-05-27 PROCEDURE — 82390 ASSAY OF CERULOPLASMIN: CPT

## 2020-05-27 PROCEDURE — 81001 URINALYSIS AUTO W/SCOPE: CPT

## 2020-05-27 PROCEDURE — 84443 ASSAY THYROID STIM HORMONE: CPT

## 2020-05-27 PROCEDURE — 84100 ASSAY OF PHOSPHORUS: CPT

## 2020-05-27 PROCEDURE — 82570 ASSAY OF URINE CREATININE: CPT

## 2020-05-28 LAB — CERULOPLASMIN SERPL-MCNC: 22.6 MG/DL (ref 16–31)

## 2020-05-30 LAB — COPPER SERPL-MCNC: 91 UG/DL (ref 72–166)

## 2020-07-14 ENCOUNTER — OFFICE VISIT (OUTPATIENT)
Dept: INTERNAL MEDICINE CLINIC | Facility: CLINIC | Age: 85
End: 2020-07-14
Payer: COMMERCIAL

## 2020-07-14 VITALS
HEART RATE: 48 BPM | DIASTOLIC BLOOD PRESSURE: 62 MMHG | WEIGHT: 171 LBS | HEIGHT: 70 IN | BODY MASS INDEX: 24.48 KG/M2 | OXYGEN SATURATION: 97 % | TEMPERATURE: 98.7 F | SYSTOLIC BLOOD PRESSURE: 116 MMHG

## 2020-07-14 DIAGNOSIS — E78.00 PURE HYPERCHOLESTEROLEMIA: ICD-10-CM

## 2020-07-14 DIAGNOSIS — I10 BENIGN ESSENTIAL HYPERTENSION: ICD-10-CM

## 2020-07-14 DIAGNOSIS — Z79.4 TYPE 2 DIABETES MELLITUS WITH DIABETIC PERIPHERAL ANGIOPATHY WITHOUT GANGRENE, WITH LONG-TERM CURRENT USE OF INSULIN (HCC): ICD-10-CM

## 2020-07-14 DIAGNOSIS — I25.10 ARTERIOSCLEROTIC CARDIOVASCULAR DISEASE: ICD-10-CM

## 2020-07-14 DIAGNOSIS — G20 PARKINSON'S DISEASE (HCC): Primary | ICD-10-CM

## 2020-07-14 DIAGNOSIS — E11.51 TYPE 2 DIABETES MELLITUS WITH DIABETIC PERIPHERAL ANGIOPATHY WITHOUT GANGRENE, WITH LONG-TERM CURRENT USE OF INSULIN (HCC): ICD-10-CM

## 2020-07-14 PROCEDURE — 3074F SYST BP LT 130 MM HG: CPT | Performed by: INTERNAL MEDICINE

## 2020-07-14 PROCEDURE — 3044F HG A1C LEVEL LT 7.0%: CPT | Performed by: INTERNAL MEDICINE

## 2020-07-14 PROCEDURE — 1160F RVW MEDS BY RX/DR IN RCRD: CPT | Performed by: INTERNAL MEDICINE

## 2020-07-14 PROCEDURE — 3008F BODY MASS INDEX DOCD: CPT | Performed by: INTERNAL MEDICINE

## 2020-07-14 PROCEDURE — 3078F DIAST BP <80 MM HG: CPT | Performed by: INTERNAL MEDICINE

## 2020-07-14 PROCEDURE — 99214 OFFICE O/P EST MOD 30 MIN: CPT | Performed by: INTERNAL MEDICINE

## 2020-07-14 PROCEDURE — 1036F TOBACCO NON-USER: CPT | Performed by: INTERNAL MEDICINE

## 2020-07-14 NOTE — ASSESSMENT & PLAN NOTE
Blood pressure showing excellent control  Patient will continue present medication and evaluation  Patient's renal function is stable

## 2020-07-14 NOTE — ASSESSMENT & PLAN NOTE
History of hyperlipidemia  Patient remains on simvastatin 40 mg a day  He states he tries to watch his diet but is not always perfect  He does again have a slip to have labs performed looking at a lipid profile as ordered by his a cardiologist   Further adjustment of medication depending on the results of testing

## 2020-07-14 NOTE — ASSESSMENT & PLAN NOTE
Patient does have a history of Parkinson's disease  We did make some adjustment with his medication including increasing his carbo levodopa but to 25/100 3 times a day  He states he has had a significant improvement  He is not back to his normal activity level  He states he is back to bowling at least 3 times a week and is bowling average is gone up  He is working on a lot of projects around the house  He is pleased with the improvement    He was told that if there is any deterioration in his neurologic status to please inform us or his neurologist

## 2020-07-14 NOTE — ASSESSMENT & PLAN NOTE
Again patient does have a history of atherosclerotic cardiovascular disease  He does follow up with his cardiologist   He denies any chest pain or pressure and no increasing shortness of breath with exertion  He remains on simvastatin in order to control his cholesterol  He states he tries to watch his diet but he is not always perfect  He will have a lipid profile performed in the near future as ordered by Cardiology

## 2020-07-14 NOTE — PROGRESS NOTES
Assessment/Plan:    Type 2 diabetes mellitus with diabetic peripheral 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 [facility])    Lab Results   Component Value Date    HGBA1C 6 5 (H) 05/27/2020    Patient does have a longstanding history of diabetes mellitus type 2  Insulin dependent  As noted with his last blood test performed by his endocrinologist his sugar showing excellent control  Along with his diabetes he does also have a history of peripheral vascular disease, coronary artery disease  Patient is been stable  The patient continues to follow-up with his endocrinologist they made no changes with his medication  He continues to monitor his blood sugars at home    Arteriosclerotic cardiovascular disease  Again patient does have a history of atherosclerotic cardiovascular disease  He does follow up with his cardiologist   He denies any chest pain or pressure and no increasing shortness of breath with exertion  He remains on simvastatin in order to control his cholesterol  He states he tries to watch his diet but he is not always perfect  He will have a lipid profile performed in the near future as ordered by Cardiology  Benign essential hypertension  Blood pressure showing excellent control  Patient will continue present medication and evaluation  Patient's renal function is stable  Parkinson's disease [facility])  Patient does have a history of Parkinson's disease  We did make some adjustment with his medication including increasing his carbo levodopa but to 25/100 3 times a day  He states he has had a significant improvement  He is not back to his normal activity level  He states he is back to bowling at least 3 times a week and is bowling average is gone up  He is working on a lot of projects around the house  He is pleased with the improvement  He was told that if there is any deterioration in his neurologic status to please inform us or his neurologist     Hyperlipidemia  History of hyperlipidemia    Patient remains on simvastatin 40 mg a day  He states he tries to watch his diet but is not always perfect  He does again have a slip to have labs performed looking at a lipid profile as ordered by his a cardiologist   Further adjustment of medication depending on the results of testing  Diagnoses and all orders for this visit:    Parkinson's disease ([facility] )    Type 2 diabetes mellitus with diabetic peripheral 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, with long-term current use of insulin (HCC)    Arteriosclerotic cardiovascular disease    Benign essential hypertension    Pure hypercholesterolemia          Subjective:      Patient ID: Rocky Blank  is a 81 y o  male  Patient is an 78-year-old male history of multiple medical problems including hypertension, hyperlipidemia, coronary artery disease, diabetes mellitus type 2 which is insulin controlled, within the last year has diagnosis of Parkinson's disease and has been started on Sinemet which is helped  Patient states in general he is doing extremely well  Is remaining active around the house and is back to bowling 3 times a week  He did have extensive lab testing performed by his endocrinologist recently is going for lipid profile in the near future  He denies any chest pain or pressure and no shortness of breath with exertion  He states his appetite is good and he has no bowel or bladder difficulties at this time  The following portions of the patient's history were reviewed and updated as appropriate: He  has a past medical history of Diabetes mellitus ([facility] ) and Hypertension    He   Patient Active Problem List    Diagnosis Date Noted    Shortness of breath 10/31/2019    Bradycardia 09/18/2019    Parkinson's disease ([facility] ) 09/05/2019    Blood in stool, mis 08/13/2019    Tremor 08/13/2019    Right arm weakness 08/13/2019    Gastroenteritis 08/06/2019    Type 2 diabetes mellitus with diabetic peripheral 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 ([facility] ) 03/27/2019    Peripheral vascular disease, unspecified ([facility] [address] ) 03/27/2019    Healthcare maintenance 10/04/2018    Other age-related cataract 10/01/2018    Dehydration 08/23/2018    Coronary artery disease involving native coronary artery of native heart 08/23/2018    Lactic acidosis 08/23/2018    Fever 08/23/2018    Dizziness 08/23/2018    Arteriosclerotic cardiovascular disease 02/07/2013    Benign essential hypertension 02/07/2013    Controlled insulin dependent diabetes mellitus 02/07/2013    Enlarged prostate without lower urinary tract symptoms (luts) 02/07/2013    Hyperlipidemia 02/07/2013     He  has a past surgical history that includes Cardiac surgery  His family history includes Diabetes in his mother; Heart attack in his mother; Hypertension in his father; Stroke in his father  He  reports that he has quit smoking  His smoking use included cigarettes  He quit after 45 00 years of use  He has never used smokeless tobacco  He reports that he drinks alcohol  He reports that he does not use drugs    Current Outpatient Medications   Medication Sig Dispense Refill    aspirin (ASPIR-81) 81 mg EC tablet Take 1 tablet by mouth daily      carbidopa-levodopa (SINEMET)  mg per tablet Take 1 tablet by mouth 3 (three) times a day 90 tablet 5    insulin detemir (LEVEMIR) 100 units/mL subcutaneous injection Inject 27 Units under the skin daily at bedtime 15units Qam and 18units QHS      lisinopril (ZESTRIL) 10 mg tablet Take 10 mg by mouth daily        ONE TOUCH ULTRA TEST test strip       simvastatin (ZOCOR) 40 mg tablet Take 40 mg by mouth daily at bedtime        clotrimazole-betamethasone (LOTRISONE) 1-0 05 % cream Apply topically 2 (two) times a day (Patient not taking: Reported on 7/14/2020) 30 g 4    diazepam (VALIUM) 10 mg tablet Take 1 tab 90 min prior to MRI with a second dose 40 min prior to imaging (Patient not taking: Reported on 7/14/2020) 2 tablet 0     No current facility-administered medications for this visit  Current Outpatient Medications on File Prior to Visit   Medication Sig    aspirin (ASPIR-81) 81 mg EC tablet Take 1 tablet by mouth daily    carbidopa-levodopa (SINEMET)  mg per tablet Take 1 tablet by mouth 3 (three) times a day    insulin detemir (LEVEMIR) 100 units/mL subcutaneous injection Inject 27 Units under the skin daily at bedtime 15units Qam and 18units QHS    lisinopril (ZESTRIL) 10 mg tablet Take 10 mg by mouth daily      ONE TOUCH ULTRA TEST test strip     simvastatin (ZOCOR) 40 mg tablet Take 40 mg by mouth daily at bedtime      clotrimazole-betamethasone (LOTRISONE) 1-0 05 % cream Apply topically 2 (two) times a day (Patient not taking: Reported on 7/14/2020)    diazepam (VALIUM) 10 mg tablet Take 1 tab 90 min prior to MRI with a second dose 40 min prior to imaging (Patient not taking: Reported on 7/14/2020)     No current facility-administered medications on file prior to visit  He has No Known Allergies       Review of Systems   Constitutional: Positive for activity change (Patient relates that with adjustments to his medication for his Parkinson's disease he is more physically active)  Negative for appetite change, chills, diaphoresis, fatigue, fever and unexpected weight change  HENT: Negative  Eyes: Negative  Respiratory: Negative  Cardiovascular: Negative  Gastrointestinal: Negative  Endocrine: Negative  Genitourinary: Negative  Musculoskeletal: Positive for arthralgias  Negative for back pain, gait problem, joint swelling, myalgias, neck pain and neck stiffness  Patient does have some minor arthritic aches and pains but nothing new or disabling  Skin: Negative  Allergic/Immunologic: Negative  Neurological: Negative  Hematological: Negative  Psychiatric/Behavioral: Negative            Objective:      /62   Pulse (!) 48   Temp 98 7 °F (37 1 °C)   Ht 5' 10" (1 778 m)   Wt 77 6 kg (171 lb) SpO2 97%   BMI 24 54 kg/m²          Physical Exam   Constitutional: He is oriented to person, place, and time  He appears well-developed and well-nourished  No distress  Very pleasant, cheerful 71-year-old male who is awake alert no acute distress and oriented x3   HENT:   Head: Normocephalic and atraumatic  Right Ear: External ear normal    Left Ear: External ear normal    Nose: Nose normal    Mouth/Throat: Oropharynx is clear and moist  No oropharyngeal exudate  Eyes: Pupils are equal, round, and reactive to light  Conjunctivae and EOM are normal  Right eye exhibits no discharge  Left eye exhibits no discharge  No scleral icterus  Neck: Normal range of motion  Neck supple  No JVD present  No tracheal deviation present  No thyromegaly present  Cardiovascular: Normal rate, regular rhythm and intact distal pulses  Exam reveals no gallop and no friction rub  Murmur heard  Pulmonary/Chest: Effort normal and breath sounds normal  No stridor  No respiratory distress  He has no wheezes  He has no rales  He exhibits no tenderness  Abdominal: Soft  Bowel sounds are normal  He exhibits no distension and no mass  There is no tenderness  There is no rebound and no guarding  No hernia  Musculoskeletal: Normal range of motion  He exhibits deformity  He exhibits no edema or tenderness  Patient does have some diffuse arthritic changes, some flattening to lumbar curve but no decreased range of motion either actively and passively  Patient does have some degenerative changes the hands and digits but nothing acute or disabling  Lymphadenopathy:     He has no cervical adenopathy  Neurological: He is alert and oriented to person, place, and time  He displays normal reflexes  No cranial nerve deficit or sensory deficit  He exhibits normal muscle tone  Coordination normal    Patient has had a dramatic improvement with his neurologic symptoms secondary to his Parkinson's disease    Patient has no rigidity no tremor no weakness to the upper lower extremities no cogwheeling  No difficulties with gait  Skin: Skin is warm  Capillary refill takes less than 2 seconds  No rash noted  He is not diaphoretic  No erythema  No pallor  Psychiatric: He has a normal mood and affect  His behavior is normal  Judgment and thought content normal    Nursing note and vitals reviewed

## 2020-07-14 NOTE — ASSESSMENT & PLAN NOTE
Lab Results   Component Value Date    HGBA1C 6 5 (H) 05/27/2020    Patient does have a longstanding history of diabetes mellitus type 2  Insulin dependent  As noted with his last blood test performed by his endocrinologist his sugar showing excellent control  Along with his diabetes he does also have a history of peripheral vascular disease, coronary artery disease  Patient is been stable  The patient continues to follow-up with his endocrinologist they made no changes with his medication    He continues to monitor his blood sugars at home

## 2020-07-23 ENCOUNTER — OFFICE VISIT (OUTPATIENT)
Dept: NEUROLOGY | Facility: CLINIC | Age: 85
End: 2020-07-23
Payer: COMMERCIAL

## 2020-07-23 VITALS
DIASTOLIC BLOOD PRESSURE: 70 MMHG | HEIGHT: 70 IN | TEMPERATURE: 97.7 F | BODY MASS INDEX: 24.47 KG/M2 | SYSTOLIC BLOOD PRESSURE: 112 MMHG | WEIGHT: 170.9 LBS

## 2020-07-23 DIAGNOSIS — G25.3 MYOCLONUS: ICD-10-CM

## 2020-07-23 DIAGNOSIS — G20 PARKINSON'S DISEASE (HCC): Primary | ICD-10-CM

## 2020-07-23 PROCEDURE — 3078F DIAST BP <80 MM HG: CPT | Performed by: PSYCHIATRY & NEUROLOGY

## 2020-07-23 PROCEDURE — 99214 OFFICE O/P EST MOD 30 MIN: CPT | Performed by: PSYCHIATRY & NEUROLOGY

## 2020-07-23 PROCEDURE — 1036F TOBACCO NON-USER: CPT | Performed by: PSYCHIATRY & NEUROLOGY

## 2020-07-23 PROCEDURE — 3074F SYST BP LT 130 MM HG: CPT | Performed by: PSYCHIATRY & NEUROLOGY

## 2020-07-23 PROCEDURE — 3008F BODY MASS INDEX DOCD: CPT | Performed by: PSYCHIATRY & NEUROLOGY

## 2020-07-23 PROCEDURE — 1160F RVW MEDS BY RX/DR IN RCRD: CPT | Performed by: PSYCHIATRY & NEUROLOGY

## 2020-07-23 PROCEDURE — 3044F HG A1C LEVEL LT 7.0%: CPT | Performed by: PSYCHIATRY & NEUROLOGY

## 2020-07-23 RX ORDER — VITAMIN B COMPLEX
TABLET ORAL DAILY
COMMUNITY

## 2020-07-23 RX ORDER — MELATONIN
1000 DAILY
COMMUNITY

## 2020-07-23 RX ORDER — MULTIVITAMIN
1 TABLET ORAL DAILY
COMMUNITY

## 2020-07-23 RX ORDER — VITAMIN B COMPLEX
1 CAPSULE ORAL DAILY
COMMUNITY

## 2020-07-23 RX ORDER — CHLORAL HYDRATE 500 MG
1000 CAPSULE ORAL EVERY OTHER DAY
COMMUNITY

## 2020-07-23 NOTE — PROGRESS NOTES
Assessment/Plan:    Parkinson's disease [facility])  60-year-old man presents in follow-up for probable Parkinson's disease  He has no complaints today  No adverse effects from the Sinemet  He continues to feel like his tremors under excellent control  Denies myoclonus today, none observed on exam     We discussed different options regarding medication management and agreed to leave his medications unchanged        Diagnoses and all orders for this visit:    Parkinson's disease ([address] )  -     carbidopa-levodopa (SINEMET)  mg per tablet; Take 1 tablet by mouth 3 (three) times a day    Myoclonus    Other orders  -     cholecalciferol (VITAMIN D3) 1,000 units tablet; Take 1,000 Units by mouth daily  -     b complex vitamins capsule; Take 1 capsule by mouth daily  -     Multiple Vitamin (MULTIVITAMIN) tablet; Take 1 tablet by mouth daily  -     Omega-3 Fatty Acids (FISH OIL) 1,000 mg; Take 1,000 mg by mouth daily  -     Coenzyme Q10 (COQ10) 100 MG CAPS; Take by mouth daily        Subjective:     Patient ID: Alberot Turcios  is a 81 y o  male  I had the pleasure of seeing your patient, Alberto Turcios  in the Movement Disorders Clinic at the [facility]  George Vega is an 81year-old right-handed man with CAD, HTN, DM, PVD, HLD, and bradycardia who presents for evaluation bilateral hand tremor, symptom onset around 2018 with significant improvement per his wife with the initiation carbidopa/levodopa  On initial presentation he had minimal parkinsonism but he was noted to have rare right-sided myoclonus of the arm and leg  Current medications and timing:  Sinemet 25/100 1 tab TID @ 9:30 3:30 9:30     Interval history:  No complaints  Feels a little weaker  Bowling 3 days a week again  No wearing off  No walking troubles no falls  No swallowing troubles   No hallucinations or memory troubles     No return of the tremor  Denies myoclonus     The following portions of the patient's history were reviewed and updated as appropriate: allergies, current medications, past family history, past medical history, past social history, past surgical history and problem list       Objective:  /70   Temp 97 7 °F (36 5 °C)   Ht 5' 10" (1 778 m)   Wt 77 5 kg (170 lb 14 4 oz)   BMI 24 52 kg/m²     Physical Exam    Neurological Exam   GENERAL MEDICAL EXAMINATION:  General appearance: alert, in no apparent distress  Appropriately dressed and groomed  Conversing and interacting appropriately  Eyes: Sclera are non-injected  Ears, nose, Mouth, Throat: Mucous membranes are moist    Resp: Breathing comfortably on RA   Musculoskeletal: No evidence of deformities  No contractures  No Edema  Skin: No visible rashes  Warm and well perfused  Psych: normal and appropriate affect     Mental Status:  Alert and oriented to person place and time  Able to relate history without difficulty  Attentive to conversation  Language is fluent and appropriate with normal prosody  There were no paraphasic errors  Speech was not dysarthric  Able to follow both midline and appendicular commands       General Neurology examination:       UPDRS motor:                              Time since last dose:  11     Speech  0     Facial Expression  0     Rigidity - Neck  0     Rigidity - Upper Extremity (Right)  0     Rigidity - Upper Extremity (Left)   0     Rigidity - Lower Extremity (Right)  0     Rigidity - Lower Extremity (Left)   0     Finger Taps (Right)   1     Finger Taps (Left)   1     Hand Movement (Right)  1     Hand Movement (Left)   1     Pronation/Supination (Right)  0     Pronation/Supination (Left)   0     Toe Tapping (Right) 1     Toe Tapping (Left) 0     Leg Agility (Right)  0     Leg Agility (Left)   0 No decrement or hesitations anywhere    Arising from Chair   0     Gait   1     Freezing of Gait 0     Postural Stability        Posture 0     Global spontaneity of movement 0     Postural Tremor (Right) 0     Postural Tremor (Left) 0     Kinetic Tremor (Right)  0     Kinetic Tremor (Left)  0     Rest tremor amplitude RUE 0     Rest tremor amplitude LUE 0     Rest tremor amplitude RLE 0     Reset tremor amplitude LLE 0     Lip/Jaw Tremor  0     Consistency of tremor 0     Motor Exam Total:          Dysmetria: none on FNF  Dyskinesia: none  Dystonia: none     Stance: narrow-based and stable   Stride: normal speed, stride length, step height, walks with no assistive device   Arm swing: reduced on the left  Turn: stable, 2-3 steps       Review of Systems   Constitutional: Negative  Negative for appetite change and fever  HENT: Negative  Negative for hearing loss, tinnitus, trouble swallowing and voice change  Eyes: Negative  Negative for photophobia and pain  Respiratory: Negative  Negative for shortness of breath  Cardiovascular: Negative  Negative for palpitations  Gastrointestinal: Negative  Negative for nausea and vomiting  Endocrine: Negative  Negative for cold intolerance  Genitourinary: Negative  Negative for dysuria, frequency and urgency  Musculoskeletal: Negative  Negative for myalgias and neck pain  Skin: Negative  Negative for rash  Neurological: Negative  Negative for dizziness, tremors, seizures, syncope, facial asymmetry, speech difficulty, weakness, light-headedness, numbness and headaches  Hematological: Negative  Does not bruise/bleed easily  Psychiatric/Behavioral: Negative  Negative for confusion, hallucinations and sleep disturbance  The above ROS was reviewed and updated       Hay Ordoñez MD  Medical Director   [facility]  Movement and Memory Specialist       Current Outpatient Medications on File Prior to Visit   Medication Sig Dispense Refill    aspirin (ASPIR-81) 81 mg EC tablet Take 1 tablet by mouth daily      b complex vitamins capsule Take 1 capsule by mouth daily      cholecalciferol (VITAMIN D3) 1,000 units tablet Take 1,000 Units by mouth daily      clotrimazole-betamethasone (LOTRISONE) 1-0 05 % cream Apply topically 2 (two) times a day 30 g 4    Coenzyme Q10 (COQ10) 100 MG CAPS Take by mouth daily      diazepam (VALIUM) 10 mg tablet Take 1 tab 90 min prior to MRI with a second dose 40 min prior to imaging 2 tablet 0    insulin detemir (LEVEMIR) 100 units/mL subcutaneous injection Inject 27 Units under the skin daily at bedtime 15units Qam and 18units QHS      lisinopril (ZESTRIL) 10 mg tablet Take 10 mg by mouth daily        Multiple Vitamin (MULTIVITAMIN) tablet Take 1 tablet by mouth daily      Omega-3 Fatty Acids (FISH OIL) 1,000 mg Take 1,000 mg by mouth daily      ONE TOUCH ULTRA TEST test strip       simvastatin (ZOCOR) 40 mg tablet Take 40 mg by mouth daily at bedtime        [DISCONTINUED] carbidopa-levodopa (SINEMET)  mg per tablet Take 1 tablet by mouth 3 (three) times a day 90 tablet 5     No current facility-administered medications on file prior to visit

## 2020-07-23 NOTE — ASSESSMENT & PLAN NOTE
62-year-old man presents in follow-up for probable Parkinson's disease  He has no complaints today  No adverse effects from the Sinemet  He continues to feel like his tremors under excellent control    Denies myoclonus today, none observed on exam     We discussed different options regarding medication management and agreed to leave his medications unchanged

## 2020-07-23 NOTE — PATIENT INSTRUCTIONS
Parkinson's Disease Resources     Helpful web sites   -  www [employer]  org   -  www parkinson  org ([employer])   -  www [employer] org ([address] for [employer]) - Order the "Every Victory Counts" sana under the "resources" tab  Or visit [facility] org/EV or call [phone]  - [facility]  org/resources/parkinsons-exercise-essentials   - Contact the [employer] for an "Aware in care kit" @ [phone] (this is a kit to take with you if you ever have to go to the hospital)    - Check out "The [address]" for help paying for your medications: www tafcares  org  -  parkinsons  ernestina edu/exercise_videos  html  - [facility] Exercise helpline: [phone]  - [facility]

## 2020-09-16 ENCOUNTER — IMMUNIZATIONS (OUTPATIENT)
Dept: INTERNAL MEDICINE CLINIC | Facility: CLINIC | Age: 85
End: 2020-09-16
Payer: COMMERCIAL

## 2020-09-16 DIAGNOSIS — Z23 ENCOUNTER FOR IMMUNIZATION: Primary | ICD-10-CM

## 2020-09-16 PROCEDURE — 90662 IIV NO PRSV INCREASED AG IM: CPT

## 2020-09-16 PROCEDURE — G0008 ADMIN INFLUENZA VIRUS VAC: HCPCS

## 2020-09-25 ENCOUNTER — APPOINTMENT (OUTPATIENT)
Dept: LAB | Facility: CLINIC | Age: 85
End: 2020-09-25
Payer: COMMERCIAL

## 2020-09-25 ENCOUNTER — TRANSCRIBE ORDERS (OUTPATIENT)
Dept: LAB | Facility: CLINIC | Age: 85
End: 2020-09-25

## 2020-09-25 DIAGNOSIS — E03.9 MYXEDEMA HEART DISEASE: Primary | ICD-10-CM

## 2020-09-25 DIAGNOSIS — E08.00 DIABETES MELLITUS DUE TO UNDERLYING CONDITION WITH HYPEROSMOLARITY WITHOUT COMA, WITHOUT LONG-TERM CURRENT USE OF INSULIN (HCC): ICD-10-CM

## 2020-09-25 DIAGNOSIS — I51.9 MYXEDEMA HEART DISEASE: Primary | ICD-10-CM

## 2020-09-25 DIAGNOSIS — E78.00 PURE HYPERCHOLESTEROLEMIA: ICD-10-CM

## 2020-09-25 DIAGNOSIS — E03.9 MYXEDEMA HEART DISEASE: ICD-10-CM

## 2020-09-25 DIAGNOSIS — I51.9 MYXEDEMA HEART DISEASE: ICD-10-CM

## 2020-09-25 LAB
ALBUMIN SERPL BCP-MCNC: 3.9 G/DL (ref 3.5–5)
ALP SERPL-CCNC: 73 U/L (ref 46–116)
ALT SERPL W P-5'-P-CCNC: 14 U/L (ref 12–78)
ANION GAP SERPL CALCULATED.3IONS-SCNC: 3 MMOL/L (ref 4–13)
AST SERPL W P-5'-P-CCNC: 17 U/L (ref 5–45)
BASOPHILS # BLD AUTO: 0.04 THOUSANDS/ΜL (ref 0–0.1)
BASOPHILS NFR BLD AUTO: 1 % (ref 0–1)
BILIRUB SERPL-MCNC: 0.68 MG/DL (ref 0.2–1)
BILIRUB UR QL STRIP: NEGATIVE
BUN SERPL-MCNC: 22 MG/DL (ref 5–25)
CALCIUM SERPL-MCNC: 9.6 MG/DL (ref 8.3–10.1)
CHLORIDE SERPL-SCNC: 107 MMOL/L (ref 100–108)
CHOLEST SERPL-MCNC: 136 MG/DL (ref 50–200)
CLARITY UR: CLEAR
CO2 SERPL-SCNC: 32 MMOL/L (ref 21–32)
COLOR UR: YELLOW
CREAT SERPL-MCNC: 1.65 MG/DL (ref 0.6–1.3)
CREAT UR-MCNC: 113 MG/DL
EOSINOPHIL # BLD AUTO: 0.28 THOUSAND/ΜL (ref 0–0.61)
EOSINOPHIL NFR BLD AUTO: 4 % (ref 0–6)
ERYTHROCYTE [DISTWIDTH] IN BLOOD BY AUTOMATED COUNT: 13.5 % (ref 11.6–15.1)
EST. AVERAGE GLUCOSE BLD GHB EST-MCNC: 143 MG/DL
GFR SERPL CREATININE-BSD FRML MDRD: 38 ML/MIN/1.73SQ M
GLUCOSE SERPL-MCNC: 103 MG/DL (ref 65–140)
GLUCOSE UR STRIP-MCNC: NEGATIVE MG/DL
HBA1C MFR BLD: 6.6 %
HCT VFR BLD AUTO: 43.4 % (ref 36.5–49.3)
HDLC SERPL-MCNC: 38 MG/DL
HGB BLD-MCNC: 14.4 G/DL (ref 12–17)
HGB UR QL STRIP.AUTO: NEGATIVE
IMM GRANULOCYTES # BLD AUTO: 0.02 THOUSAND/UL (ref 0–0.2)
IMM GRANULOCYTES NFR BLD AUTO: 0 % (ref 0–2)
KETONES UR STRIP-MCNC: NEGATIVE MG/DL
LDLC SERPL CALC-MCNC: 79 MG/DL (ref 0–100)
LEUKOCYTE ESTERASE UR QL STRIP: NEGATIVE
LYMPHOCYTES # BLD AUTO: 1.7 THOUSANDS/ΜL (ref 0.6–4.47)
LYMPHOCYTES NFR BLD AUTO: 23 % (ref 14–44)
MAGNESIUM SERPL-MCNC: 2.4 MG/DL (ref 1.6–2.6)
MCH RBC QN AUTO: 31.2 PG (ref 26.8–34.3)
MCHC RBC AUTO-ENTMCNC: 33.2 G/DL (ref 31.4–37.4)
MCV RBC AUTO: 94 FL (ref 82–98)
MICROALBUMIN UR-MCNC: 12.1 MG/L (ref 0–20)
MICROALBUMIN/CREAT 24H UR: 11 MG/G CREATININE (ref 0–30)
MONOCYTES # BLD AUTO: 0.44 THOUSAND/ΜL (ref 0.17–1.22)
MONOCYTES NFR BLD AUTO: 6 % (ref 4–12)
NEUTROPHILS # BLD AUTO: 4.9 THOUSANDS/ΜL (ref 1.85–7.62)
NEUTS SEG NFR BLD AUTO: 66 % (ref 43–75)
NITRITE UR QL STRIP: NEGATIVE
NONHDLC SERPL-MCNC: 98 MG/DL
NRBC BLD AUTO-RTO: 0 /100 WBCS
PH UR STRIP.AUTO: 6 [PH]
PHOSPHATE SERPL-MCNC: 3.4 MG/DL (ref 2.3–4.1)
PLATELET # BLD AUTO: 157 THOUSANDS/UL (ref 149–390)
PMV BLD AUTO: 11.1 FL (ref 8.9–12.7)
POTASSIUM SERPL-SCNC: 4.3 MMOL/L (ref 3.5–5.3)
PROT SERPL-MCNC: 7.2 G/DL (ref 6.4–8.2)
PROT UR STRIP-MCNC: NEGATIVE MG/DL
RBC # BLD AUTO: 4.62 MILLION/UL (ref 3.88–5.62)
SODIUM SERPL-SCNC: 142 MMOL/L (ref 136–145)
SP GR UR STRIP.AUTO: 1.01 (ref 1–1.03)
T4 FREE SERPL-MCNC: 0.9 NG/DL (ref 0.76–1.46)
TRIGL SERPL-MCNC: 93 MG/DL
TSH SERPL DL<=0.05 MIU/L-ACNC: 4.74 UIU/ML (ref 0.36–3.74)
UROBILINOGEN UR QL STRIP.AUTO: 0.2 E.U./DL
WBC # BLD AUTO: 7.38 THOUSAND/UL (ref 4.31–10.16)

## 2020-09-25 PROCEDURE — 80053 COMPREHEN METABOLIC PANEL: CPT

## 2020-09-25 PROCEDURE — 36415 COLL VENOUS BLD VENIPUNCTURE: CPT

## 2020-09-25 PROCEDURE — 80061 LIPID PANEL: CPT

## 2020-09-25 PROCEDURE — 82043 UR ALBUMIN QUANTITATIVE: CPT

## 2020-09-25 PROCEDURE — 83036 HEMOGLOBIN GLYCOSYLATED A1C: CPT

## 2020-09-25 PROCEDURE — 84100 ASSAY OF PHOSPHORUS: CPT

## 2020-09-25 PROCEDURE — 81003 URINALYSIS AUTO W/O SCOPE: CPT

## 2020-09-25 PROCEDURE — 84443 ASSAY THYROID STIM HORMONE: CPT

## 2020-09-25 PROCEDURE — 82570 ASSAY OF URINE CREATININE: CPT

## 2020-09-25 PROCEDURE — 83735 ASSAY OF MAGNESIUM: CPT

## 2020-09-25 PROCEDURE — 85025 COMPLETE CBC W/AUTO DIFF WBC: CPT

## 2020-09-25 PROCEDURE — 84439 ASSAY OF FREE THYROXINE: CPT

## 2020-11-17 ENCOUNTER — OFFICE VISIT (OUTPATIENT)
Dept: INTERNAL MEDICINE CLINIC | Facility: CLINIC | Age: 85
End: 2020-11-17
Payer: COMMERCIAL

## 2020-11-17 VITALS
TEMPERATURE: 96.7 F | SYSTOLIC BLOOD PRESSURE: 128 MMHG | HEIGHT: 70 IN | HEART RATE: 42 BPM | OXYGEN SATURATION: 97 % | BODY MASS INDEX: 24.77 KG/M2 | WEIGHT: 173 LBS | DIASTOLIC BLOOD PRESSURE: 70 MMHG

## 2020-11-17 DIAGNOSIS — Z79.4 TYPE 2 DIABETES MELLITUS WITH DIABETIC PERIPHERAL ANGIOPATHY WITHOUT GANGRENE, WITH LONG-TERM CURRENT USE OF INSULIN (HCC): Primary | ICD-10-CM

## 2020-11-17 DIAGNOSIS — I25.10 CORONARY ARTERY DISEASE INVOLVING NATIVE CORONARY ARTERY OF NATIVE HEART WITHOUT ANGINA PECTORIS: ICD-10-CM

## 2020-11-17 DIAGNOSIS — E78.00 PURE HYPERCHOLESTEROLEMIA: ICD-10-CM

## 2020-11-17 DIAGNOSIS — I10 BENIGN ESSENTIAL HYPERTENSION: ICD-10-CM

## 2020-11-17 DIAGNOSIS — Z00.00 HEALTHCARE MAINTENANCE: ICD-10-CM

## 2020-11-17 DIAGNOSIS — G20 PARKINSON'S DISEASE (HCC): ICD-10-CM

## 2020-11-17 DIAGNOSIS — E11.51 TYPE 2 DIABETES MELLITUS WITH DIABETIC PERIPHERAL ANGIOPATHY WITHOUT GANGRENE, WITH LONG-TERM CURRENT USE OF INSULIN (HCC): Primary | ICD-10-CM

## 2020-11-17 DIAGNOSIS — R00.1 BRADYCARDIA: ICD-10-CM

## 2020-11-17 PROCEDURE — 1036F TOBACCO NON-USER: CPT | Performed by: INTERNAL MEDICINE

## 2020-11-17 PROCEDURE — 1160F RVW MEDS BY RX/DR IN RCRD: CPT | Performed by: INTERNAL MEDICINE

## 2020-11-17 PROCEDURE — 99214 OFFICE O/P EST MOD 30 MIN: CPT | Performed by: INTERNAL MEDICINE

## 2020-11-17 PROCEDURE — 3074F SYST BP LT 130 MM HG: CPT | Performed by: INTERNAL MEDICINE

## 2020-11-17 PROCEDURE — T1015 CLINIC SERVICE: HCPCS | Performed by: INTERNAL MEDICINE

## 2020-11-17 PROCEDURE — 3078F DIAST BP <80 MM HG: CPT | Performed by: INTERNAL MEDICINE

## 2020-11-23 ENCOUNTER — TRANSCRIBE ORDERS (OUTPATIENT)
Dept: LAB | Facility: CLINIC | Age: 85
End: 2020-11-23

## 2020-11-23 ENCOUNTER — LAB (OUTPATIENT)
Dept: LAB | Facility: CLINIC | Age: 85
End: 2020-11-23
Payer: COMMERCIAL

## 2020-11-23 DIAGNOSIS — N40.1 ENLARGED PROSTATE WITH URINARY OBSTRUCTION: ICD-10-CM

## 2020-11-23 DIAGNOSIS — R97.20 ELEVATED PROSTATE SPECIFIC ANTIGEN (PSA): Primary | ICD-10-CM

## 2020-11-23 DIAGNOSIS — N13.8 ENLARGED PROSTATE WITH URINARY OBSTRUCTION: ICD-10-CM

## 2020-11-23 DIAGNOSIS — R97.20 ELEVATED PROSTATE SPECIFIC ANTIGEN (PSA): ICD-10-CM

## 2020-11-23 LAB
BUN SERPL-MCNC: 21 MG/DL (ref 5–25)
CREAT SERPL-MCNC: 1.4 MG/DL (ref 0.6–1.3)
GFR SERPL CREATININE-BSD FRML MDRD: 46 ML/MIN/1.73SQ M
PSA SERPL-MCNC: 6.8 NG/ML (ref 0–4)

## 2020-11-23 PROCEDURE — 84153 ASSAY OF PSA TOTAL: CPT

## 2020-11-23 PROCEDURE — 84520 ASSAY OF UREA NITROGEN: CPT

## 2020-11-23 PROCEDURE — 82565 ASSAY OF CREATININE: CPT

## 2020-11-23 PROCEDURE — 36415 COLL VENOUS BLD VENIPUNCTURE: CPT

## 2021-01-21 ENCOUNTER — IMMUNIZATIONS (OUTPATIENT)
Dept: FAMILY MEDICINE CLINIC | Facility: HOSPITAL | Age: 86
End: 2021-01-21

## 2021-01-21 DIAGNOSIS — Z23 ENCOUNTER FOR IMMUNIZATION: Primary | ICD-10-CM

## 2021-01-21 PROCEDURE — 0001A SARS-COV-2 / COVID-19 MRNA VACCINE (PFIZER-BIONTECH) 30 MCG: CPT

## 2021-01-21 PROCEDURE — 91300 SARS-COV-2 / COVID-19 MRNA VACCINE (PFIZER-BIONTECH) 30 MCG: CPT

## 2021-01-26 ENCOUNTER — OFFICE VISIT (OUTPATIENT)
Dept: NEUROLOGY | Facility: CLINIC | Age: 86
End: 2021-01-26
Payer: COMMERCIAL

## 2021-01-26 VITALS
WEIGHT: 173 LBS | HEART RATE: 72 BPM | DIASTOLIC BLOOD PRESSURE: 74 MMHG | BODY MASS INDEX: 24.82 KG/M2 | SYSTOLIC BLOOD PRESSURE: 134 MMHG

## 2021-01-26 DIAGNOSIS — G20 PARKINSON'S DISEASE (HCC): Primary | ICD-10-CM

## 2021-01-26 PROCEDURE — 3078F DIAST BP <80 MM HG: CPT | Performed by: PHYSICIAN ASSISTANT

## 2021-01-26 PROCEDURE — 99213 OFFICE O/P EST LOW 20 MIN: CPT | Performed by: PHYSICIAN ASSISTANT

## 2021-01-26 PROCEDURE — 3075F SYST BP GE 130 - 139MM HG: CPT | Performed by: PHYSICIAN ASSISTANT

## 2021-01-26 PROCEDURE — 1036F TOBACCO NON-USER: CPT | Performed by: PHYSICIAN ASSISTANT

## 2021-01-26 PROCEDURE — 1160F RVW MEDS BY RX/DR IN RCRD: CPT | Performed by: PHYSICIAN ASSISTANT

## 2021-01-26 RX ORDER — DIPHENOXYLATE HYDROCHLORIDE AND ATROPINE SULFATE 2.5; .025 MG/1; MG/1
1 TABLET ORAL DAILY
COMMUNITY

## 2021-01-26 NOTE — ASSESSMENT & PLAN NOTE
Patient continues to do well with low dose Sinemet  He no longer notices any tremors in the hands  He feels that he is otherwise functioning well  He does have some left greater than right bradykinesia on exam however this is not noticeable to the patient  At this time will have him remain on his current dose of Sinemet  He was also encouraged to remain active, he goes bowling 3 days a week

## 2021-01-26 NOTE — PROGRESS NOTES
Patient ID: Latricia Dyer  is a 81 y o  male  Assessment/Plan:    Parkinson's disease ([facility] [address] )  Patient continues to do well with low dose Sinemet  He no longer notices any tremors in the hands  He feels that he is otherwise functioning well  He does have some left greater than right bradykinesia on exam however this is not noticeable to the patient  At this time will have him remain on his current dose of Sinemet  He was also encouraged to remain active, he goes bowling 3 days a week  Subjective:    Latricia Dyer  is an 81 y o  male with CAD, HTN, DM, PVD, HLD and probable Parkinson's disease who presents for follow up  To review, he initially presented for evaluation bilateral hand tremor, symptom onset around 2018 with significant improvement per his wife with the initiation carbidopa/levodopa  On initial presentation he had minimal parkinsonism but he was noted to have rare right-sided myoclonus of the arm and leg  At his last visit he was doing well and felt that his tremors remained well controlled with Sinemet  No changes were made  INTERVAL HISTORY:  He feels that he is overall still doing good  His tremors remain well controlled  He does not notice any tremors at this time  He is able to perform all of his ADLs on his own  He resides with his wife  His wife does the cooking  No issues with swallowing  No falls  He sleeps well other than having to wake to use the bathroom  He feels that his memory is still good  No hallucinations  Current medications and timing:  Sinemet 25/100 1 tab TID @ 9:30 3:30 9:30       I personally reviewed and updated the ROS  Objective:    Blood pressure 134/74, pulse 72, weight 78 5 kg (173 lb)  Physical Exam  Constitutional:       Appearance: Normal appearance     HENT:      Right Ear: Hearing normal       Left Ear: Hearing normal    Eyes:      General: Lids are normal       Extraocular Movements: Extraocular movements intact  Pupils: Pupils are equal, round, and reactive to light  Pulmonary:      Effort: Pulmonary effort is normal    Neurological:      Mental Status: He is alert  Deep Tendon Reflexes: Strength normal    Psychiatric:         Speech: Speech normal          Neurological Exam  Mental Status  Alert  Oriented to person, place and time  Speech is normal     Cranial Nerves  CN III, IV, VI: Extraocular movements intact bilaterally  Normal lids and orbits bilaterally  Pupils equal round and reactive to light bilaterally  CN V:  Right: Facial sensation is normal   Left: Facial sensation is normal on the left  CN VIII:  Right: Hearing is normal   Left: Hearing is normal   CN XI: Shoulder shrug strength is normal   CN XII: Tongue midline without atrophy or fasciculations  Motor   Strength is 5/5 throughout all four extremities  Sensory  Light touch is normal in upper and lower extremities  Coordination  Right: Finger-to-nose abnormality:  Left: Finger-to-nose abnormality:    Gait  Arose without issues  Slightly decreased stride, slightly wider based gait  Stooped posture  Dortha Jonathon       UPDRS motor:                              Time since last dose:  7/23/20 1/26/21   Speech  0  0   Facial Expression  0     Rigidity - Neck  0     Rigidity - Upper Extremity (Right)  0  0   Rigidity - Upper Extremity (Left)   0  1   Rigidity - Lower Extremity (Right)  0  0   Rigidity - Lower Extremity (Left)   0  0   Finger Taps (Right)   1  1   Finger Taps (Left)   1  2   Hand Movement (Right)  1  1   Hand Movement (Left)   1  2   Pronation/Supination (Right)  0  0   Pronation/Supination (Left)   0  1   Toe Tapping (Right) 1  0   Toe Tapping (Left) 0  0   Leg Agility (Right)  0  0   Leg Agility (Left)   0 0   Arising from Chair   0  0   Gait   1  1   Freezing of Gait 0  0   Postural Stability         Posture 0  1   Global spontaneity of movement 0  0   Postural Tremor (Right) 0  0   Postural Tremor (Left) 0  0   Kinetic Tremor (Right)  0  0   Kinetic Tremor (Left)  0  0   Rest tremor amplitude RUE 0  0   Rest tremor amplitude LUE 0  0   Rest tremor amplitude RLE 0  0   Reset tremor amplitude LLE 0  0   Lip/Jaw Tremor  0  0   Consistency of tremor 0  0   Motor Exam Total:           ROS:    Review of Systems   Constitutional: Negative  Negative for appetite change and fever  HENT: Negative  Negative for hearing loss, tinnitus, trouble swallowing and voice change  Eyes: Negative  Negative for photophobia and pain  Respiratory: Negative  Negative for shortness of breath  Cardiovascular: Negative  Negative for palpitations  Gastrointestinal: Negative  Negative for nausea and vomiting  Endocrine: Negative  Negative for cold intolerance  Genitourinary: Negative  Negative for dysuria, frequency and urgency  Musculoskeletal: Negative  Negative for myalgias and neck pain  Skin: Negative  Negative for rash  Allergic/Immunologic: Negative  Neurological: Negative  Negative for dizziness, tremors, seizures, syncope, facial asymmetry, speech difficulty, weakness, light-headedness, numbness and headaches  Hematological: Negative  Does not bruise/bleed easily  Psychiatric/Behavioral: Negative  Negative for confusion, hallucinations and sleep disturbance  All other systems reviewed and are negative

## 2021-02-09 ENCOUNTER — IMMUNIZATIONS (OUTPATIENT)
Dept: FAMILY MEDICINE CLINIC | Facility: HOSPITAL | Age: 86
End: 2021-02-09

## 2021-02-09 DIAGNOSIS — Z23 ENCOUNTER FOR IMMUNIZATION: Primary | ICD-10-CM

## 2021-02-09 PROCEDURE — 91300 SARS-COV-2 / COVID-19 MRNA VACCINE (PFIZER-BIONTECH) 30 MCG: CPT

## 2021-02-09 PROCEDURE — 0002A SARS-COV-2 / COVID-19 MRNA VACCINE (PFIZER-BIONTECH) 30 MCG: CPT

## 2021-02-26 ENCOUNTER — TELEMEDICINE (OUTPATIENT)
Dept: INTERNAL MEDICINE CLINIC | Facility: CLINIC | Age: 86
End: 2021-02-26
Payer: COMMERCIAL

## 2021-02-26 DIAGNOSIS — R06.00 DOE (DYSPNEA ON EXERTION): Primary | ICD-10-CM

## 2021-02-26 DIAGNOSIS — E11.51 TYPE 2 DIABETES MELLITUS WITH DIABETIC PERIPHERAL ANGIOPATHY WITHOUT GANGRENE, WITH LONG-TERM CURRENT USE OF INSULIN (HCC): ICD-10-CM

## 2021-02-26 DIAGNOSIS — Z79.4 TYPE 2 DIABETES MELLITUS WITH DIABETIC PERIPHERAL ANGIOPATHY WITHOUT GANGRENE, WITH LONG-TERM CURRENT USE OF INSULIN (HCC): ICD-10-CM

## 2021-02-26 PROBLEM — R06.09 DOE (DYSPNEA ON EXERTION): Status: ACTIVE | Noted: 2021-02-26

## 2021-02-26 PROCEDURE — 99214 OFFICE O/P EST MOD 30 MIN: CPT | Performed by: NURSE PRACTITIONER

## 2021-02-26 RX ORDER — AZITHROMYCIN 250 MG/1
TABLET, FILM COATED ORAL
Qty: 6 TABLET | Refills: 0 | OUTPATIENT
Start: 2021-02-26 | End: 2021-03-02

## 2021-02-26 RX ORDER — ALBUTEROL SULFATE 90 UG/1
2 AEROSOL, METERED RESPIRATORY (INHALATION) EVERY 6 HOURS PRN
Qty: 1 INHALER | Refills: 5 | Status: SHIPPED | OUTPATIENT
Start: 2021-02-26

## 2021-02-26 NOTE — ASSESSMENT & PLAN NOTE
Blood sugar today was 147, they have been stable  Continue with current treatment plan    Lab Results   Component Value Date    HGBA1C 6 6 (H) 09/25/2020

## 2021-02-26 NOTE — ASSESSMENT & PLAN NOTE
PANTOJA with improving URI sx including cough, decreased appetite  His cough is non-productive, no fever, chills  No edema  Pt has had both covid vaccines and has been homebound for about a month  I explained that it is difficulty to assess via video and he is not willing to go to the ER  I would like him to go for a chest xray and will start him on azithromycin and prn albuterol, but explained that if he does not improve or condition worsens he needs to be evaluated  He verbalized understanding

## 2021-02-26 NOTE — PROGRESS NOTES
Virtual Regular Visit      Assessment/Plan:    Problem List Items Addressed This Visit        Endocrine    Type 2 diabetes mellitus with diabetic peripheral 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 ([facility] [address] )     Blood sugar today was 147, they have been stable  Continue with current treatment plan  Lab Results   Component Value Date    HGBA1C 6 6 (H) 09/25/2020               Other    PANTOJA (dyspnea on exertion) - Primary     PANTOJA with improving URI sx including cough, decreased appetite  His cough is non-productive, no fever, chills  No edema  Pt has had both covid vaccines and has been homebound for about a month  I explained that it is difficulty to assess via video and he is not willing to go to the ER  I would like him to go for a chest xray and will start him on azithromycin and prn albuterol, but explained that if he does not improve or condition worsens he needs to be evaluated  He verbalized understanding  Relevant Medications    azithromycin (ZITHROMAX) 250 mg tablet    albuterol (PROVENTIL HFA,VENTOLIN HFA) 90 mcg/act inhaler    Other Relevant Orders    XR chest pa & lateral               Reason for visit is   Chief Complaint   Patient presents with    Virtual Regular Visit        Encounter provider Radhika Herndon, [address] [place] [address]    Provider located at [address] [address]      Recent Visits  No visits were found meeting these conditions  Showing recent visits within past 7 days and meeting all other requirements     Today's Visits  Date Type Provider Dept   02/26/21 [address] [facility], [address] Internal Med   Showing today's visits and meeting all other requirements     Future Appointments  No visits were found meeting these conditions  Showing future appointments within next 150 days and meeting all other requirements        The patient was identified by name and date of birth   Lynn Husain  was informed that this is a telemedicine visit and that the visit is being conducted through [facility] and patient was informed that this is a secure, HIPAA-compliant platform  He agrees to proceed     My office door was closed  The patient was notified the following individuals were present in the room MSN student  He acknowledged consent and understanding of privacy and security of the video platform  The patient has agreed to participate and understands they can discontinue the visit at any time  Patient is aware this is a billable service  Subjective  Lynn Husain  is a 81 y o  male seen for evaluation of PANTOJA   Pt is an 80 yo male seen via video for evaluation of PANTOJA  PMH of DM, CAD, PVD, PD, HLD  He states he started about 2 weeks ago with a cough and poor appetite  These symptoms have improved a lot but he continues with PANTOJA  States he can hardly walk at all without becoming short of breath  His cough is not productive, he denies fever, chills, headache, body ache, n/v   C/o umbilical pain  LBM was today  He has been taking otc cold medicine which contains tylenol, guaifenesin, sudafed, and dextromethorphan  He does not feel this is helping           Past Medical History:   Diagnosis Date    Diabetes mellitus ([facility] [address] )     Hypertension        Past Surgical History:   Procedure Laterality Date    CARDIAC SURGERY         Current Outpatient Medications   Medication Sig Dispense Refill    albuterol (PROVENTIL HFA,VENTOLIN HFA) 90 mcg/act inhaler Inhale 2 puffs every 6 (six) hours as needed for wheezing or shortness of breath 1 Inhaler 5    aspirin (ASPIR-81) 81 mg EC tablet Take 1 tablet by mouth daily      azithromycin (ZITHROMAX) 250 mg tablet Take 2 tablets on day 1 of treatment, then 1 tablet daily x 4 days 6 tablet 0    b complex vitamins capsule Take 1 capsule by mouth daily      carbidopa-levodopa (SINEMET)  mg per tablet Take 1 tablet by mouth 3 (three) times a day 270 tablet 3    cholecalciferol (VITAMIN D3) 1,000 units tablet Take 1,000 Units by mouth daily      Coenzyme Q10 (COQ10) 100 MG CAPS Take by mouth daily      insulin detemir (LEVEMIR) 100 units/mL subcutaneous injection Inject 27 Units under the skin daily at bedtime 15units Qam and 18units QHS      lisinopril (ZESTRIL) 10 mg tablet Take 10 mg by mouth daily        Multiple Vitamin (MULTIVITAMIN) tablet Take 1 tablet by mouth daily      multivitamin (THERAGRAN) TABS Take 1 tablet by mouth daily      Omega-3 Fatty Acids (FISH OIL) 1,000 mg Take 1,000 mg by mouth daily      ONE TOUCH ULTRA TEST test strip       simvastatin (ZOCOR) 40 mg tablet Take 40 mg by mouth daily at bedtime         No current facility-administered medications for this visit  Allergies   Allergen Reactions    Metformin GI Intolerance       Review of Systems   Constitutional: Negative for appetite change, chills, fatigue and fever  HENT: Negative for congestion, hearing loss, sinus pressure and sore throat  Respiratory: Positive for cough and shortness of breath  Negative for chest tightness and wheezing  Cardiovascular: Negative for chest pain and leg swelling  Gastrointestinal: Positive for abdominal pain  Negative for diarrhea, nausea and vomiting  Genitourinary: Negative for dysuria  Musculoskeletal: Negative for myalgias  Neurological: Negative for dizziness, light-headedness and headaches  Hematological: Negative for adenopathy  Video Exam    There were no vitals filed for this visit  Physical Exam  Constitutional:       Appearance: Normal appearance  He is well-developed  He is not ill-appearing or toxic-appearing  Eyes:      General: Lids are normal       Conjunctiva/sclera: Conjunctivae normal    Pulmonary:      Effort: Pulmonary effort is normal  No tachypnea, accessory muscle usage or respiratory distress  Skin:     General: Skin is dry     Neurological:      Mental Status: He is alert and oriented to person, place, and time  Psychiatric:         Attention and Perception: Attention normal          Mood and Affect: Mood normal          Speech: Speech normal          Behavior: Behavior normal  Behavior is cooperative  Thought Content: Thought content normal          Judgment: Judgment normal           I spent 30 minutes with patient today in which greater than 50% of the time was spent in counseling/coordination of care regarding symptoms, evaluation, management, diabetes, f/u      VIRTUAL VISIT 2001 Josh Montero  acknowledges that he has consented to an online visit or consultation  He understands that the online visit is based solely on information provided by him, and that, in the absence of a face-to-face physical evaluation by the physician, the diagnosis he receives is both limited and provisional in terms of accuracy and completeness  This is not intended to replace a full medical face-to-face evaluation by the physician  Dexterradha Ruth  understands and accepts these terms

## 2021-02-27 ENCOUNTER — APPOINTMENT (OUTPATIENT)
Dept: RADIOLOGY | Age: 86
End: 2021-02-27
Payer: COMMERCIAL

## 2021-02-27 DIAGNOSIS — R06.00 DOE (DYSPNEA ON EXERTION): ICD-10-CM

## 2021-02-27 PROCEDURE — 71046 X-RAY EXAM CHEST 2 VIEWS: CPT

## 2021-03-01 ENCOUNTER — TELEPHONE (OUTPATIENT)
Dept: INTERNAL MEDICINE CLINIC | Facility: CLINIC | Age: 86
End: 2021-03-01

## 2021-03-01 NOTE — TELEPHONE ENCOUNTER
Patients wife called to go over Chest Xray results from 2/27/21  She would like to know what the next step is for her   Please call her back at [phone]    Thank you

## 2021-03-01 NOTE — TELEPHONE ENCOUNTER
Patient called for report of chest x-ray that was performed recently  We do not have report as of yet  I did look at the x-ray itself which I do not feel shows any specific abnormalities  Apparently patient did well with the oral steroids but he is almost finished    We will await the results of his chest x-ray report and then decide whether further workup and evaluation is needed, possible pulmonary function testing or referral to pulmonologist

## 2021-03-02 ENCOUNTER — APPOINTMENT (EMERGENCY)
Dept: CT IMAGING | Facility: HOSPITAL | Age: 86
End: 2021-03-02
Payer: COMMERCIAL

## 2021-03-02 ENCOUNTER — TELEPHONE (OUTPATIENT)
Dept: INTERNAL MEDICINE CLINIC | Facility: CLINIC | Age: 86
End: 2021-03-02

## 2021-03-02 ENCOUNTER — APPOINTMENT (EMERGENCY)
Dept: RADIOLOGY | Facility: HOSPITAL | Age: 86
End: 2021-03-02
Payer: COMMERCIAL

## 2021-03-02 ENCOUNTER — HOSPITAL ENCOUNTER (EMERGENCY)
Facility: HOSPITAL | Age: 86
Discharge: HOME | End: 2021-03-02
Attending: EMERGENCY MEDICINE | Admitting: EMERGENCY MEDICINE
Payer: COMMERCIAL

## 2021-03-02 VITALS
RESPIRATION RATE: 20 BRPM | DIASTOLIC BLOOD PRESSURE: 59 MMHG | HEART RATE: 65 BPM | OXYGEN SATURATION: 98 % | TEMPERATURE: 98.5 F | SYSTOLIC BLOOD PRESSURE: 129 MMHG

## 2021-03-02 DIAGNOSIS — R06.02 SOB (SHORTNESS OF BREATH): ICD-10-CM

## 2021-03-02 DIAGNOSIS — J18.9 PNEUMONIA OF BOTH LUNGS DUE TO INFECTIOUS ORGANISM, UNSPECIFIED PART OF LUNG: Primary | ICD-10-CM

## 2021-03-02 DIAGNOSIS — J12.82 PNEUMONIA DUE TO COVID-19 VIRUS: Primary | ICD-10-CM

## 2021-03-02 DIAGNOSIS — U07.1 PNEUMONIA DUE TO COVID-19 VIRUS: Primary | ICD-10-CM

## 2021-03-02 LAB
ALBUMIN SERPL BCP-MCNC: 3.1 G/DL (ref 3.5–5)
ALP SERPL-CCNC: 85 U/L (ref 46–116)
ALT SERPL W P-5'-P-CCNC: 13 U/L (ref 12–78)
ANION GAP SERPL CALCULATED.3IONS-SCNC: 6 MMOL/L (ref 4–13)
AST SERPL W P-5'-P-CCNC: 21 U/L (ref 5–45)
BASOPHILS # BLD AUTO: 0.04 THOUSANDS/ΜL (ref 0–0.1)
BASOPHILS NFR BLD AUTO: 1 % (ref 0–1)
BILIRUB SERPL-MCNC: 0.53 MG/DL (ref 0.2–1)
BUN SERPL-MCNC: 22 MG/DL (ref 5–25)
CALCIUM ALBUM COR SERPL-MCNC: 9.7 MG/DL (ref 8.3–10.1)
CALCIUM SERPL-MCNC: 9 MG/DL (ref 8.3–10.1)
CHLORIDE SERPL-SCNC: 100 MMOL/L (ref 100–108)
CO2 SERPL-SCNC: 27 MMOL/L (ref 21–32)
CREAT SERPL-MCNC: 1.59 MG/DL (ref 0.6–1.3)
D DIMER PPP FEU-MCNC: 0.81 UG/ML FEU
EOSINOPHIL # BLD AUTO: 0.06 THOUSAND/ΜL (ref 0–0.61)
EOSINOPHIL NFR BLD AUTO: 1 % (ref 0–6)
ERYTHROCYTE [DISTWIDTH] IN BLOOD BY AUTOMATED COUNT: 13.9 % (ref 11.6–15.1)
FLUAV RNA RESP QL NAA+PROBE: NEGATIVE
FLUBV RNA RESP QL NAA+PROBE: NEGATIVE
GFR SERPL CREATININE-BSD FRML MDRD: 39 ML/MIN/1.73SQ M
GLUCOSE SERPL-MCNC: 259 MG/DL (ref 65–140)
HCT VFR BLD AUTO: 44.1 % (ref 36.5–49.3)
HGB BLD-MCNC: 14.6 G/DL (ref 12–17)
HOLD SPECIMEN: NORMAL
IMM GRANULOCYTES # BLD AUTO: 0.02 THOUSAND/UL (ref 0–0.2)
IMM GRANULOCYTES NFR BLD AUTO: 0 % (ref 0–2)
LYMPHOCYTES # BLD AUTO: 1.17 THOUSANDS/ΜL (ref 0.6–4.47)
LYMPHOCYTES NFR BLD AUTO: 23 % (ref 14–44)
MAGNESIUM SERPL-MCNC: 1.7 MG/DL (ref 1.6–2.6)
MCH RBC QN AUTO: 30.4 PG (ref 26.8–34.3)
MCHC RBC AUTO-ENTMCNC: 33.1 G/DL (ref 31.4–37.4)
MCV RBC AUTO: 92 FL (ref 82–98)
MONOCYTES # BLD AUTO: 0.41 THOUSAND/ΜL (ref 0.17–1.22)
MONOCYTES NFR BLD AUTO: 8 % (ref 4–12)
NEUTROPHILS # BLD AUTO: 3.37 THOUSANDS/ΜL (ref 1.85–7.62)
NEUTS SEG NFR BLD AUTO: 67 % (ref 43–75)
NRBC BLD AUTO-RTO: 0 /100 WBCS
NT-PROBNP SERPL-MCNC: 240 PG/ML
PLATELET # BLD AUTO: 282 THOUSANDS/UL (ref 149–390)
PMV BLD AUTO: 10.3 FL (ref 8.9–12.7)
POTASSIUM SERPL-SCNC: 5 MMOL/L (ref 3.5–5.3)
PROT SERPL-MCNC: 7.6 G/DL (ref 6.4–8.2)
RBC # BLD AUTO: 4.81 MILLION/UL (ref 3.88–5.62)
RSV RNA RESP QL NAA+PROBE: NEGATIVE
SARS-COV-2 RNA RESP QL NAA+PROBE: POSITIVE
SODIUM SERPL-SCNC: 133 MMOL/L (ref 136–145)
TROPONIN I SERPL-MCNC: <0.02 NG/ML
WBC # BLD AUTO: 5.07 THOUSAND/UL (ref 4.31–10.16)

## 2021-03-02 PROCEDURE — 80053 COMPREHEN METABOLIC PANEL: CPT | Performed by: EMERGENCY MEDICINE

## 2021-03-02 PROCEDURE — 83880 ASSAY OF NATRIURETIC PEPTIDE: CPT | Performed by: PSYCHIATRY & NEUROLOGY

## 2021-03-02 PROCEDURE — 99284 EMERGENCY DEPT VISIT MOD MDM: CPT | Performed by: EMERGENCY MEDICINE

## 2021-03-02 PROCEDURE — 71045 X-RAY EXAM CHEST 1 VIEW: CPT

## 2021-03-02 PROCEDURE — 0241U HB NFCT DS VIR RESP RNA 4 TRGT: CPT | Performed by: PSYCHIATRY & NEUROLOGY

## 2021-03-02 PROCEDURE — 85025 COMPLETE CBC W/AUTO DIFF WBC: CPT | Performed by: EMERGENCY MEDICINE

## 2021-03-02 PROCEDURE — 85379 FIBRIN DEGRADATION QUANT: CPT | Performed by: PSYCHIATRY & NEUROLOGY

## 2021-03-02 PROCEDURE — 99285 EMERGENCY DEPT VISIT HI MDM: CPT

## 2021-03-02 PROCEDURE — 93005 ELECTROCARDIOGRAM TRACING: CPT

## 2021-03-02 PROCEDURE — 36415 COLL VENOUS BLD VENIPUNCTURE: CPT

## 2021-03-02 PROCEDURE — 83735 ASSAY OF MAGNESIUM: CPT | Performed by: PSYCHIATRY & NEUROLOGY

## 2021-03-02 PROCEDURE — 84484 ASSAY OF TROPONIN QUANT: CPT | Performed by: PSYCHIATRY & NEUROLOGY

## 2021-03-02 RX ORDER — AMOXICILLIN AND CLAVULANATE POTASSIUM 875; 125 MG/1; MG/1
1 TABLET, FILM COATED ORAL EVERY 12 HOURS SCHEDULED
Qty: 14 TABLET | Refills: 0 | OUTPATIENT
Start: 2021-03-02 | End: 2021-03-02

## 2021-03-02 NOTE — TELEPHONE ENCOUNTER
Spoke with pts wife about cxr  He has significant pna bilaterally, R>L  Pt has 1 day left of azithromycin, will add on augmentin  Pt still with person, fatigue, shakiness  Would like him to go to the ER but pt refuses  He is willing to get evaluated at care now so he can be assessed, will need covid testing  Order placed

## 2021-03-02 NOTE — ED PROVIDER NOTES
History  Chief Complaint   Patient presents with    Shortness of Breath     sent by pcp for r/o pna/covid based on recent xray  pt reports felling sob x1 week  Marcin Chen  is a 81 y o  male w/ PMH PD, bradycardia and other arrhythmias, HTN, CAD, remote smoking history p/w worsening SOB/PANTOJA present for at least a month  Patient d/w PCP who sent him for imaging and lab work and CXR demonstrated bilateral opacities which were read as PNA  Patient was started on nebulizers and antibiotics w/ some reported symptomatic improvement  Patient denies CP, palpitations, N/V/F/C but endorses night sweats, weight loss of 10 lbs over this time period, and increased shakiness/weakness/lightheadedness  Prior to Admission Medications   Prescriptions Last Dose Informant Patient Reported? Taking?    Coenzyme Q10 (COQ10) 100 MG CAPS  Self Yes No   Sig: Take by mouth daily   Multiple Vitamin (MULTIVITAMIN) tablet  Self Yes No   Sig: Take 1 tablet by mouth daily   ONE TOUCH ULTRA TEST test strip   Yes No   Omega-3 Fatty Acids (FISH OIL) 1,000 mg  Self Yes No   Sig: Take 1,000 mg by mouth daily   albuterol (PROVENTIL HFA,VENTOLIN HFA) 90 mcg/act inhaler   No No   Sig: Inhale 2 puffs every 6 (six) hours as needed for wheezing or shortness of breath   amoxicillin-clavulanate (AUGMENTIN) 875-125 mg per tablet   No No   Sig: Take 1 tablet by mouth every 12 (twelve) hours for 7 days TAKE WITH FOOD   aspirin (ASPIR-81) 81 mg EC tablet   Yes No   Sig: Take 1 tablet by mouth daily   azithromycin (ZITHROMAX) 250 mg tablet   No No   Sig: Take 2 tablets on day 1 of treatment, then 1 tablet daily x 4 days   b complex vitamins capsule  Self Yes No   Sig: Take 1 capsule by mouth daily   carbidopa-levodopa (SINEMET)  mg per tablet   No No   Sig: Take 1 tablet by mouth 3 (three) times a day   cholecalciferol (VITAMIN D3) 1,000 units tablet  Self Yes No   Sig: Take 1,000 Units by mouth daily   insulin detemir (LEVEMIR) 100 units/mL subcutaneous injection   Yes No   Sig: Inject 27 Units under the skin daily at bedtime 15units Qam and 18units QHS   lisinopril (ZESTRIL) 10 mg tablet   Yes No   Sig: Take 10 mg by mouth daily     multivitamin (THERAGRAN) TABS   Yes No   Sig: Take 1 tablet by mouth daily   simvastatin (ZOCOR) 40 mg tablet   Yes No   Sig: Take 40 mg by mouth daily at bedtime        Facility-Administered Medications: None       Past Medical History:   Diagnosis Date    Diabetes mellitus ([facility] [address] )     Hypertension        Past Surgical History:   Procedure Laterality Date    CARDIAC SURGERY         Family History   Problem Relation Age of Onset    Diabetes Mother     Heart attack Mother     Stroke Father     Hypertension Father      I have reviewed and agree with the history as documented  E-Cigarette/Vaping    E-Cigarette Use Never User      E-Cigarette/Vaping Substances    Nicotine No     THC No     CBD No     Flavoring No     Other No     Unknown No      Social History     Tobacco Use    Smoking status: Former Smoker     Years: 45 00     Types: Cigarettes    Smokeless tobacco: Never Used   Substance Use Topics    Alcohol use: Yes     Comment: sparingly    Drug use: No      Review of Systems   Constitutional: Positive for activity change, diaphoresis (night sweats) and unexpected weight change (loss about 10 lbs / last mo)  Negative for chills, fatigue and fever  Eyes: Negative for visual disturbance  Respiratory: Positive for cough (had non-productive cough up until about a week ago) and shortness of breath  Negative for wheezing  Cardiovascular: Negative for chest pain, palpitations and leg swelling  Gastrointestinal: Positive for diarrhea  Negative for abdominal distention, abdominal pain, blood in stool, constipation, nausea and vomiting  Endocrine: Positive for cold intolerance  Negative for polyuria  Genitourinary: Negative for dysuria and hematuria  Neurological: Positive for weakness  Negative for dizziness, tremors, numbness and headaches  Psychiatric/Behavioral: Negative for sleep disturbance (night sweats)  Physical Exam  ED Triage Vitals   Temperature Pulse Respirations Blood Pressure SpO2   03/02/21 1238 03/02/21 1238 03/02/21 1244 03/02/21 1238 03/02/21 1238   98 5 °F (36 9 °C) 73 18 168/84 94 %      Temp src Heart Rate Source Patient Position - Orthostatic VS BP Location FiO2 (%)   -- 03/02/21 1459 03/02/21 1238 03/02/21 1238 --    Monitor Sitting Right arm       Pain Score       --                Orthostatic Vital Signs  Vitals:    03/02/21 1238 03/02/21 1459   BP: 168/84 129/59   Pulse: 73 65   Patient Position - Orthostatic VS: Sitting Lying     Physical Exam  Vitals signs reviewed  Constitutional:       General: He is not in acute distress  Appearance: Normal appearance  He is not ill-appearing, toxic-appearing or diaphoretic  Comments: Thin and frail   HENT:      Head: Normocephalic and atraumatic  Right Ear: External ear normal       Left Ear: External ear normal       Nose: Nose normal  No congestion or rhinorrhea  Mouth/Throat:      Mouth: Mucous membranes are moist       Pharynx: Oropharynx is clear  No oropharyngeal exudate or posterior oropharyngeal erythema  Eyes:      General: No scleral icterus  Extraocular Movements: Extraocular movements intact  Conjunctiva/sclera: Conjunctivae normal       Pupils: Pupils are equal, round, and reactive to light  Neck:      Trachea: No tracheal deviation  Cardiovascular:      Rate and Rhythm: Normal rate and regular rhythm  Heart sounds: Normal heart sounds  No murmur  No friction rub  No gallop  Pulmonary:      Effort: Pulmonary effort is normal  No respiratory distress  Breath sounds: No stridor  Rhonchi (diffuse, sparse, coarse breath sounds) present  No wheezing or rales  Chest:      Chest wall: No tenderness  Abdominal:      General: Abdomen is flat   Bowel sounds are normal  There is no distension  Palpations: Abdomen is soft  Tenderness: There is no abdominal tenderness  There is no guarding or rebound  Musculoskeletal:      Right lower leg: No edema  Left lower leg: No edema  Skin:     General: Skin is warm and dry  Coloration: Skin is pale  Skin is not cyanotic or jaundiced  Neurological:      General: No focal deficit present  Mental Status: He is alert and oriented to person, place, and time  Cranial Nerves: No cranial nerve deficit  Sensory: No sensory deficit  Motor: No weakness or abnormal muscle tone  Coordination: Coordination normal    Psychiatric:         Mood and Affect: Mood normal          Behavior: Behavior normal        ED Medications  Medications - No data to display    Diagnostic Studies  Results Reviewed     Procedure Component Value Units Date/Time    NT-BNP PRO [[id number]]  (Normal) Collected: 03/02/21 1244    Lab Status: Final result Specimen: Blood from Arm, Right Updated: 03/02/21 1539     NT-proBNP 240 pg/mL     Magnesium [[id number]]  (Normal) Collected: 03/02/21 1412    Lab Status: Final result Specimen: Blood Updated: 03/02/21 1539     Magnesium 1 7 mg/dL     COVID19, Influenza A/B, RSV PCR, [facility] [[id number]]  (Abnormal) Collected: 03/02/21 1333    Lab Status: Final result Specimen: Nares from Nose Updated: 03/02/21 1457     SARS-CoV-2 Positive     INFLUENZA A PCR Negative     INFLUENZA B PCR Negative     RSV PCR Negative    Narrative: This test has been authorized by FDA under an EUA (Emergency Use Assay) for use by authorized laboratories  Clinical caution and judgement should be used with the interpretation of these results with consideration of the clinical impression and other laboratory testing  Testing reported as "Positive" or "Negative" has been proven to be accurate according to standard laboratory validation requirements    All testing is performed with control materials showing appropriate reactivity at standard intervals      D-dimer, quantitative [[id number]]  (Abnormal) Collected: 03/02/21 1244    Lab Status: Final result Specimen: Blood from Arm, Right Updated: 03/02/21 1439     D-Dimer, Quant 0 81 ug/ml FEU     Troponin I [[id number]]  (Normal) Collected: 03/02/21 1333    Lab Status: Final result Specimen: Blood Updated: 03/02/21 1413     Troponin I <0 02 ng/mL     Comprehensive metabolic panel [[id number]]  (Abnormal) Collected: 03/02/21 1244    Lab Status: Final result Specimen: Blood from Arm, Right Updated: 03/02/21 1320     Sodium 133 mmol/L      Potassium 5 0 mmol/L      Chloride 100 mmol/L      CO2 27 mmol/L      ANION GAP 6 mmol/L      BUN 22 mg/dL      Creatinine 1 59 mg/dL      Glucose 259 mg/dL      Calcium 9 0 mg/dL      Corrected Calcium 9 7 mg/dL      AST 21 U/L      ALT 13 U/L      Alkaline Phosphatase 85 U/L      Total Protein 7 6 g/dL      Albumin 3 1 g/dL      Total Bilirubin 0 53 mg/dL      eGFR 39 ml/min/1 73sq m     Narrative:      [facility] guidelines for Chronic Kidney Disease (CKD):     Stage 1 with normal or high GFR (GFR > 90 mL/min/1 73 square meters)    Stage 2 Mild CKD (GFR = 60-89 mL/min/1 73 square meters)    Stage 3A Moderate CKD (GFR = 45-59 mL/min/1 73 square meters)    Stage 3B Moderate CKD (GFR = 30-44 mL/min/1 73 square meters)    Stage 4 Severe CKD (GFR = 15-29 mL/min/1 73 square meters)    Stage 5 End Stage CKD (GFR <15 mL/min/1 73 square meters)  Note: GFR calculation is accurate only with a steady state creatinine    CBC and differential [[id number]] Collected: 03/02/21 1244    Lab Status: Final result Specimen: Blood from Arm, Right Updated: 03/02/21 1256     WBC 5 07 Thousand/uL      RBC 4 81 Million/uL      Hemoglobin 14 6 g/dL      Hematocrit 44 1 %      MCV 92 fL      MCH 30 4 pg      MCHC 33 1 g/dL      RDW 13 9 %      MPV 10 3 fL      Platelets 724 Thousands/uL      nRBC 0 /100 WBCs      Neutrophils Relative 67 %      Immat GRANS % 0 %      Lymphocytes Relative 23 %      Monocytes Relative 8 %      Eosinophils Relative 1 %      Basophils Relative 1 %      Neutrophils Absolute 3 37 Thousands/µL      Immature Grans Absolute 0 02 Thousand/uL      Lymphocytes Absolute 1 17 Thousands/µL      Monocytes Absolute 0 41 Thousand/µL      Eosinophils Absolute 0 06 Thousand/µL      Basophils Absolute 0 04 Thousands/µL              XR chest 1 view portable   Final Result by Jennifer Jimenez MD (03/02 6837)      Bilateral infiltrates similar to the study from 2/27/2021                  Workstation performed: [id number]             Procedures  Procedures    ED Course       MDM  Number of Diagnoses or Management Options  Pneumonia due to COVID-19 virus: new and requires workup  SOB (shortness of breath): new and requires workup  Diagnosis management comments: This patient w/ SOB x 1 mo and opacities on CXR is concerning for PNA vs other pulmonary or cardiac etiology  Patient has improved on nebulizers and abx but w/ significant persistent PANTOJA  Endorses weight loss, weakness/shakiness and night sweats over the same time period  CXR w/ overall similar to 2/27 CXR (maybe slightly improved)  EKG sinus rhythm w/ probable PVCs but no signs of ischemia, and troponin and BNP negative  CMP w/ mild hyperglycemia and mild hyponatremia, w/ sm  bump in creatinine (1 40 --> 1 59) c/w dehydration  Troponin, CBC and dimer WNL  COVID came back positive which explains night sweats, weight loss, improved cough, persistent PANTOJA, despite the fact that he was vaccinated  The vaccination may be why the course has been relatively mild  Patient sustained SpO2 98% during ambulation and is stable for d/c home, discontinuing antibiotics and continuing other  conservative measures and both patient and wife were comfortable with the management plan and are aware of red flag sx and when they would need to return to the ED          Amount and/or Complexity of Data Reviewed  Clinical lab tests: ordered and reviewed  Tests in the radiology section of CPT®: ordered and reviewed    Risk of Complications, Morbidity, and/or Mortality  Presenting problems: moderate  Diagnostic procedures: moderate  Management options: moderate      Disposition  Final diagnoses:   Pneumonia due to COVID-19 virus   SOB (shortness of breath)     Time reflects when diagnosis was documented in both MDM as applicable and the Disposition within this note     Time User Action Codes Description Comment    3/2/2021  3:37 PM Gleen Stack Add [U07 1,  J12 82] Pneumonia due to COVID-19 virus     3/2/2021  3:37 PM Gleen Stack Add [R06 02] SOB (shortness of breath)     3/2/2021  3:46 PM Gleen Stack Add [U07 1] COVID-19     3/2/2021  3:51 PM Gleen Stack Remove [U07 1] COVID-19       ED Disposition     ED Disposition Condition Date/Time Comment    Discharge Stable Tue Mar 2, 2021  3:48 PM Iline Ask  discharge to home/self care  Follow-up Information     Follow up With Specialties Details Why Contact Info Additional [address] [facility], DO Internal Medicine In 1 week  [address] [place] [address]  [address] [facility] Emergency Department Emergency Medicine Go to  If you develop fevers, or significantly worsening symptoms, or if your oxygen saturation drops below 92% [address] [facility] [address] [facility] Emergency Department, [address], [place], [place], [address]        Current Discharge Medication List      CONTINUE these medications which have NOT CHANGED    Details   albuterol (PROVENTIL HFA,VENTOLIN HFA) 90 mcg/act inhaler Inhale 2 puffs every 6 (six) hours as needed for wheezing or shortness of breath  Qty: 1 Inhaler, Refills: 5    Comments: Substitution to a formulary equivalent within the same pharmaceutical class is authorized    Associated Diagnoses: PANTOJA (dyspnea on exertion)      aspirin (ASPIR-81) 81 mg EC tablet Take 1 tablet by mouth daily      b complex vitamins capsule Take 1 capsule by mouth daily      carbidopa-levodopa (SINEMET)  mg per tablet Take 1 tablet by mouth 3 (three) times a day  Qty: 270 tablet, Refills: 3    Associated Diagnoses: Parkinson's disease (HCC)      cholecalciferol (VITAMIN D3) 1,000 units tablet Take 1,000 Units by mouth daily      Coenzyme Q10 (COQ10) 100 MG CAPS Take by mouth daily      insulin detemir (LEVEMIR) 100 units/mL subcutaneous injection Inject 27 Units under the skin daily at bedtime 15units Qam and 18units QHS      lisinopril (ZESTRIL) 10 mg tablet Take 10 mg by mouth daily        !! Multiple Vitamin (MULTIVITAMIN) tablet Take 1 tablet by mouth daily      !! multivitamin (THERAGRAN) TABS Take 1 tablet by mouth daily      Omega-3 Fatty Acids (FISH OIL) 1,000 mg Take 1,000 mg by mouth daily      ONE TOUCH ULTRA TEST test strip       simvastatin (ZOCOR) 40 mg tablet Take 40 mg by mouth daily at bedtime         !! - Potential duplicate medications found  Please discuss with provider  STOP taking these medications       amoxicillin-clavulanate (AUGMENTIN) 875-125 mg per tablet Comments:   Reason for Stopping:         azithromycin (ZITHROMAX) 250 mg tablet Comments:   Reason for Stopping:             No discharge procedures on file  PDMP Review     None         ED Provider  Attending physically available and evaluated Wanda Hartman    ZEKE managed the patient along with the ED Attending      Electronically Signed by     Patricio Wong MD  03/02/21 2558       Patricio Wong MD  03/02/21 4473

## 2021-03-02 NOTE — DISCHARGE INSTRUCTIONS
You came to the Emergency Room for evaluation of shortness of breath that has been persistent for about a month  Your repeat chest x-ray is consistent with the previous findings of bilateral pneumonia, but your blood work does not show any abnormalities that would indicate a bacterial pneumonia  Furthermore, your positive COVID swab indicates that you have COVID pneumonia, which would explain all of your ongoing symptoms including shortness of breath, decreased appetite, night sweats, diarrhea, and weakness  Because your oxygen remained about 98% throughout your walking test in the Emergency Room, you are safe to continue to care for yourself at home  You can continue your multivitamins, and continue nebulizer treatments as needed for symptoms as directed by your primary care physician, but no longer need to take antibiotics (you should discontinue azithromycin and augmentin) as your pneumonia appears to be viral  You should obtain a pulse oximeter and intermittently measure your oxygen, the goal being to remain above 92%  If you develop fevers or significantly worsening symptoms, please return to the Emergency Room for re-evaluation  At this point, given that you are at least 20 days past the first symptoms you experienced, evidence suggests that you should not be able to spread active virus (i e  you should no longer be contagious) and should therefore be able to discontinue isolation  If you have any questions regarding this, or are unsure of the recommendations, you should contact your primary care physician for further recommendations  You should follow up with your primary care physician and continue to exercise caution in exerting yourself as your lungs are still recovering

## 2021-03-03 LAB
ATRIAL RATE: 80 BPM
QRS AXIS: 92 DEGREES
QRSD INTERVAL: 90 MS
QT INTERVAL: 380 MS
QTC INTERVAL: 430 MS
T WAVE AXIS: -22 DEGREES
VENTRICULAR RATE: 77 BPM

## 2021-03-03 PROCEDURE — 93010 ELECTROCARDIOGRAM REPORT: CPT | Performed by: INTERNAL MEDICINE

## 2021-03-04 NOTE — ED ATTENDING ATTESTATION
3/2/2021  I, Lyna Gowers, MD, saw and evaluated the patient  I have discussed the patient with the resident/non-physician practitioner and agree with the resident's/non-physician practitioner's findings, Plan of Care, and MDM as documented in the resident's/non-physician practitioner's note, except where noted  All available labs and Radiology studies were reviewed  I was present for key portions of any procedure(s) performed by the resident/non-physician practitioner and I was immediately available to provide assistance  At this point I agree with the current assessment done in the Emergency Department  I have conducted an independent evaluation of this patient a history and physical is as follows:    80-year-old male presenting to emergency department with shortness of breath, for the last month, seen by PCP, given breathing treatment which improves shortness of breath but gets worse once treatment is done  Had abnormal chest x-ray and was refered to ER  Agree with assessment and plan by resident  COVID is positive  Ambulating with normal sats      D-dimer elevated but within age adjusted D-dimer        ED Course         Critical Care Time  Procedures

## 2021-03-05 ENCOUNTER — TELEMEDICINE (OUTPATIENT)
Dept: INTERNAL MEDICINE CLINIC | Facility: CLINIC | Age: 86
End: 2021-03-05
Payer: COMMERCIAL

## 2021-03-05 VITALS
OXYGEN SATURATION: 98 % | WEIGHT: 163 LBS | HEIGHT: 70 IN | BODY MASS INDEX: 23.34 KG/M2 | HEART RATE: 43 BPM | TEMPERATURE: 95.6 F

## 2021-03-05 DIAGNOSIS — U07.1 COVID-19: Primary | ICD-10-CM

## 2021-03-05 PROCEDURE — 3725F SCREEN DEPRESSION PERFORMED: CPT | Performed by: INTERNAL MEDICINE

## 2021-03-05 PROCEDURE — 1101F PT FALLS ASSESS-DOCD LE1/YR: CPT | Performed by: INTERNAL MEDICINE

## 2021-03-05 PROCEDURE — 99213 OFFICE O/P EST LOW 20 MIN: CPT | Performed by: INTERNAL MEDICINE

## 2021-03-05 PROCEDURE — 3288F FALL RISK ASSESSMENT DOCD: CPT | Performed by: INTERNAL MEDICINE

## 2021-03-05 NOTE — ASSESSMENT & PLAN NOTE
Patient is calling for evaluation  Had received his 2nd COVID vaccine but did end up with problems with shortness of breath and cough, some loose stools  Patient did go for COVID testing which was positive  He may have been sick at least a week ago  He states slowly he is getting better  Less shortness of breath less cough no fever no chills  States that his bowels were lose but the elder coming back to normal   Keeping close eye on his blood sugar readings and no hypoglycemia  Some diffuse myalgias and arthralgias but again he states that he is getting better  He is taking his vitamin-C, vitamin-D, zinc and a baby aspirin daily  Patient will be contacted early next week for re-evaluation but he was told in the interim if any new symptoms or problems to please call the office even if it is the weekend to talk to the doctor on call

## 2021-03-05 NOTE — PROGRESS NOTES
COVID-19 Virtual Visit     Assessment/Plan:    Problem List Items Addressed This Visit        Other    COVID-19 - Primary     Patient is calling for evaluation  Had received his 2nd COVID vaccine but did end up with problems with shortness of breath and cough, some loose stools  Patient did go for COVID testing which was positive  He may have been sick at least a week ago  He states slowly he is getting better  Less shortness of breath less cough no fever no chills  States that his bowels were lose but the elder coming back to normal   Keeping close eye on his blood sugar readings and no hypoglycemia  Some diffuse myalgias and arthralgias but again he states that he is getting better  He is taking his vitamin-C, vitamin-D, zinc and a baby aspirin daily  Patient will be contacted early next week for re-evaluation but he was told in the interim if any new symptoms or problems to please call the office even if it is the weekend to talk to the doctor on call  Disposition:     I recommended self-quarantine for 14 days and to call back for worsening symptoms or development of shortness of breath  I have spent 20 minutes directly with the patient  Greater than 50% of this time was spent in counseling/coordination of care regarding: diagnostic results, prognosis, risks and benefits of treatment options, instructions for management, patient and family education, importance of treatment compliance, risk factor reductions and impressions          Encounter provider Nell Ford DO    Provider located at [address] [address]    Recent Visits  Date Type Provider Dept   03/02/21 Telephone Melissa Handy, [address] Internal [facility]   03/01/21 Telephone [address] [facility] Internal Med   02/26/21 Telemedicine Smiley Parnell, [address] Internal [facility]   Showing recent visits within past 7 days and meeting all other requirements     Today's Visits  Date Type Provider Dept   03/05/21 Telemedicine Tayler Salgado, [address] Internal [facility]   Showing today's visits and meeting all other requirements     Future Appointments  No visits were found meeting these conditions  Showing future appointments within next 150 days and meeting all other requirements      This virtual check-in was done via "[employer] and patient was informed that this is not a secure, HIPAA-compliant platform  He agrees to proceed  Patient agrees to participate in a virtual check in via telephone or video visit instead of presenting to the office to address urgent/immediate medical needs  Patient is aware this is a billable service  After connecting through [facility], the patient was identified by name and date of birth  Latricia Dyer  was informed that this was a telemedicine visit and that the exam was being conducted confidentially over secure lines  My office door was closed  No one else was in the room  Latricia Dyer  acknowledged consent and understanding of privacy and security of the telemedicine visit  I informed the patient that I have reviewed his record in Epic and presented the opportunity for him to ask any questions regarding the visit today  The patient agreed to participate  Subjective:   Latricia Dyer  is a 81 y o  male who is concerned about COVID-19  Patient's symptoms include fatigue, nasal congestion, anosmia, loss of taste, cough, shortness of breath, abdominal pain, nausea, diarrhea and myalgias  Patient denies fever, chills, malaise, rhinorrhea, sore throat, chest tightness, vomiting and headaches       Date of symptom onset: 3/1/2021    Lab Results   Component Value Date    SARSCOV2 Positive (A) 03/02/2021     Past Medical History:   Diagnosis Date    Diabetes mellitus ([address] )     Hypertension      Past Surgical History:   Procedure Laterality Date    CARDIAC SURGERY       Current Outpatient Medications   Medication Sig Dispense Refill    albuterol (PROVENTIL HFA,VENTOLIN HFA) 90 mcg/act inhaler Inhale 2 puffs every 6 (six) hours as needed for wheezing or shortness of breath 1 Inhaler 5    aspirin (ASPIR-81) 81 mg EC tablet Take 1 tablet by mouth daily      b complex vitamins capsule Take 1 capsule by mouth daily      carbidopa-levodopa (SINEMET)  mg per tablet Take 1 tablet by mouth 3 (three) times a day 270 tablet 3    cholecalciferol (VITAMIN D3) 1,000 units tablet Take 1,000 Units by mouth daily      Coenzyme Q10 (COQ10) 100 MG CAPS Take by mouth daily      insulin detemir (LEVEMIR) 100 units/mL subcutaneous injection Inject 27 Units under the skin daily at bedtime 15units Qam and 18units QHS      lisinopril (ZESTRIL) 10 mg tablet Take 10 mg by mouth daily        Multiple Vitamin (MULTIVITAMIN) tablet Take 1 tablet by mouth daily      multivitamin (THERAGRAN) TABS Take 1 tablet by mouth daily      Omega-3 Fatty Acids (FISH OIL) 1,000 mg Take 1,000 mg by mouth daily      ONE TOUCH ULTRA TEST test strip       simvastatin (ZOCOR) 40 mg tablet Take 40 mg by mouth daily at bedtime         No current facility-administered medications for this visit  Allergies   Allergen Reactions    Metformin GI Intolerance       Review of Systems   Constitutional: Positive for fatigue  Negative for chills and fever  HENT: Positive for congestion  Negative for rhinorrhea and sore throat  Respiratory: Positive for cough and shortness of breath  Negative for chest tightness  Gastrointestinal: Positive for abdominal pain, diarrhea and nausea  Negative for vomiting  Musculoskeletal: Positive for myalgias  Neurological: Negative for headaches  Objective:    Vitals:    03/05/21 1535   Pulse: (!) 43   Temp: (!) 95 6 °F (35 3 °C)   SpO2: 98%   Weight: 73 9 kg (163 lb)   Height: 5' 10" (1 778 m)       Physical Exam  Vitals signs and nursing note reviewed     Constitutional:       General: He is not in acute distress  Appearance: He is not ill-appearing, toxic-appearing or diaphoretic  Comments: Elderly male who is awake alert no acute distress and oriented x3, accompanied by his wife   HENT:      Head: Normocephalic and atraumatic  Neurological:      Mental Status: He is alert and oriented to person, place, and time  Psychiatric:         Mood and Affect: Mood normal          Behavior: Behavior normal          Thought Content: Thought content normal          Judgment: Judgment normal        VIRTUAL VISIT DISCLAIMER    Lv Rand  acknowledges that he has consented to an online visit or consultation  He understands that the online visit is based solely on information provided by him, and that, in the absence of a face-to-face physical evaluation by the physician, the diagnosis he receives is both limited and provisional in terms of accuracy and completeness  This is not intended to replace a full medical face-to-face evaluation by the physician  Lv Rand  understands and accepts these terms

## 2021-03-09 ENCOUNTER — TELEMEDICINE (OUTPATIENT)
Dept: INTERNAL MEDICINE CLINIC | Facility: CLINIC | Age: 86
End: 2021-03-09
Payer: COMMERCIAL

## 2021-03-09 VITALS — OXYGEN SATURATION: 98 % | HEART RATE: 65 BPM | TEMPERATURE: 95.1 F

## 2021-03-09 DIAGNOSIS — U07.1 COVID-19: Primary | ICD-10-CM

## 2021-03-09 PROCEDURE — 99212 OFFICE O/P EST SF 10 MIN: CPT | Performed by: INTERNAL MEDICINE

## 2021-03-09 NOTE — PROGRESS NOTES
COVID-19 Virtual Visit     Assessment/Plan:    Problem List Items Addressed This Visit        Other    COVID-19 - Primary     Patient is contacted today for follow-up, positive COVID with respiratory symptoms, shortness of breath cough and chest congestion  Patient relates overall is improving  Less shortness of breath with exertion but the still exists  Still is using his albuterol inhaler intermittently which she states this helped  Patient relates his appetite has returned no bowel bladder changes  He has some very mild arthralgias but not severe and does not interfere with his mobility  States he still has some fatigability especially after any brisk exertion  Patient overall is improving  Patient will continue to take his supplements including vitamin D, vitamin-C, baby aspirin daily and zinc tablets  He does have a scheduled appointment to be seen in our office for a repeat Medicare wellness visit in 1 week and he will be cleared to return to the office at that point time  Was told if any symptoms or problems related to his COVID virus infection in the interim to please call  Disposition:     I recommended continued isolation until at least 24 hours have passed since recovery defined as resolution of fever without the use of fever-reducing medications AND improvement in COVID symptoms AND 10 days have passed since onset of symptoms (or 10 days have passed since date of first positive viral diagnostic test for asymptomatic patients)  I have spent 20 minutes directly with the patient  Greater than 50% of this time was spent in counseling/coordination of care regarding: diagnostic results, prognosis, risks and benefits of treatment options, instructions for management, patient and family education, importance of treatment compliance, risk factor reductions and impressions          Encounter provider Kaci Isaac DO    Provider located at [address] [place] [address] MEDICINE  [address] [address]    Recent Visits  Date Type Provider Dept   03/05/21 Telemedicine Jen Kwong, [address] [facility] Internal Med   03/02/21 Telephone Saqib Ramesh, [address] Internal Med   Showing recent visits within past 7 days and meeting all other requirements     Today's Visits  Date Type Provider Dept   03/09/21 Telemedicine Jen Kwong, [address] [facility] Internal Med   Showing today's visits and meeting all other requirements     Future Appointments  No visits were found meeting these conditions  Showing future appointments within next 150 days and meeting all other requirements      This virtual check-in was done via [employer] and patient was informed that this is not a secure, HIPAA-compliant platform  He agrees to proceed  Patient agrees to participate in a virtual check in via telephone or video visit instead of presenting to the office to address urgent/immediate medical needs  Patient is aware this is a billable service  After connecting through [facility], the patient was identified by name and date of birth  Shay Fernandez  was informed that this was a telemedicine visit and that the exam was being conducted confidentially over secure lines  My office door was closed  No one else was in the room  Shay Fernandez  acknowledged consent and understanding of privacy and security of the telemedicine visit  I informed the patient that I have reviewed his record in Epic and presented the opportunity for him to ask any questions regarding the visit today  The patient agreed to participate  Subjective:   Shay Fernandez is a 81 y o  male who has been screened for COVID-19  Symptom change since last report: resolving  Patient's symptoms include fatigue, shortness of breath and myalgias   Patient denies fever, chills, malaise, congestion, rhinorrhea, sore throat, anosmia, loss of taste, cough, chest tightness, abdominal pain, nausea, vomiting, diarrhea and headaches  Talia Escobar has been staying home and has isolated themselves in his home  He is taking care to not share personal items and is cleaning all surfaces that are touched often, like counters, tabletops, and doorknobs using household cleaning sprays or wipes  Wearing a mask when leaving room?: is not      Date of symptom onset: 2/26/2021  Date of positive COVID-19 PCR: 3/2/2021    Lab Results   Component Value Date    SARSCOV2 Positive (A) 03/02/2021     Past Medical History:   Diagnosis Date    Diabetes mellitus ([facility] [address] )     Hypertension      Past Surgical History:   Procedure Laterality Date    CARDIAC SURGERY       Current Outpatient Medications   Medication Sig Dispense Refill    albuterol (PROVENTIL HFA,VENTOLIN HFA) 90 mcg/act inhaler Inhale 2 puffs every 6 (six) hours as needed for wheezing or shortness of breath 1 Inhaler 5    aspirin (ASPIR-81) 81 mg EC tablet Take 1 tablet by mouth daily      b complex vitamins capsule Take 1 capsule by mouth daily      carbidopa-levodopa (SINEMET)  mg per tablet Take 1 tablet by mouth 3 (three) times a day 270 tablet 3    cholecalciferol (VITAMIN D3) 1,000 units tablet Take 1,000 Units by mouth daily      Coenzyme Q10 (COQ10) 100 MG CAPS Take by mouth daily      insulin detemir (LEVEMIR) 100 units/mL subcutaneous injection Inject 27 Units under the skin daily at bedtime 15units Qam and 18units QHS      lisinopril (ZESTRIL) 10 mg tablet Take 10 mg by mouth daily        Multiple Vitamin (MULTIVITAMIN) tablet Take 1 tablet by mouth daily      multivitamin (THERAGRAN) TABS Take 1 tablet by mouth daily      Omega-3 Fatty Acids (FISH OIL) 1,000 mg Take 1,000 mg by mouth daily      ONE TOUCH ULTRA TEST test strip       simvastatin (ZOCOR) 40 mg tablet Take 40 mg by mouth daily at bedtime         No current facility-administered medications for this visit        Allergies   Allergen Reactions    Metformin GI Intolerance       Review of Systems   Constitutional: Positive for fatigue  Negative for chills and fever  HENT: Negative for congestion, rhinorrhea and sore throat  Respiratory: Positive for shortness of breath  Negative for cough and chest tightness  Gastrointestinal: Negative for abdominal pain, diarrhea, nausea and vomiting  Musculoskeletal: Positive for myalgias  Neurological: Negative for headaches  Objective:    Vitals:    03/09/21 1604   Pulse: 65   Temp: (!) 95 1 °F (35 1 °C)   SpO2: 98%       Physical Exam  Vitals signs and nursing note reviewed  Constitutional:       General: He is not in acute distress  Appearance: Normal appearance  He is not ill-appearing, toxic-appearing or diaphoretic  Comments: Patient contacted by video telephone, is awake alert in no acute distress  Smiling  HENT:      Head: Normocephalic and atraumatic  Pulmonary:      Comments: No respiratory distress noted with conversation  Neurological:      Mental Status: He is alert and oriented to person, place, and time  Psychiatric:         Mood and Affect: Mood normal          Thought Content: Thought content normal          Judgment: Judgment normal        VIRTUAL VISIT DISCLAIMER    Yue Aparicio  acknowledges that he has consented to an online visit or consultation  He understands that the online visit is based solely on information provided by him, and that, in the absence of a face-to-face physical evaluation by the physician, the diagnosis he receives is both limited and provisional in terms of accuracy and completeness  This is not intended to replace a full medical face-to-face evaluation by the physician  Yue Aparicio  understands and accepts these terms

## 2021-03-09 NOTE — ASSESSMENT & PLAN NOTE
Patient is contacted today for follow-up, positive COVID with respiratory symptoms, shortness of breath cough and chest congestion  Patient relates overall is improving  Less shortness of breath with exertion but the still exists  Still is using his albuterol inhaler intermittently which she states this helped  Patient relates his appetite has returned no bowel bladder changes  He has some very mild arthralgias but not severe and does not interfere with his mobility  States he still has some fatigability especially after any brisk exertion  Patient overall is improving  Patient will continue to take his supplements including vitamin D, vitamin-C, baby aspirin daily and zinc tablets  He does have a scheduled appointment to be seen in our office for a repeat Medicare wellness visit in 1 week and he will be cleared to return to the office at that point time  Was told if any symptoms or problems related to his COVID virus infection in the interim to please call

## 2021-03-09 NOTE — PROGRESS NOTES
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 ([facility] [address] )  I have evaluated the patient and found the condition to be: Stable  I have evaluated the patient and: Adjusted the medications as indicated and Recommended continue with same treatment plan  The patient should continue treatment and follow-up with: Patient's blood sugar shows excellent control with present treatment  He does have an upcoming appointment to be seen by Endocrinology for further evaluation  He is up-to-date all screening including podiatry care, eye exams,  He does check his blood sugars on a regular basis  Patient states he has had no claudication symptoms to lower extremities and is fully active    [id number] Controlled insulin dependent diabetes mellitus  I have evaluated the patient and found the condition to be: Stable  I have evaluated the patient and: Adjusted medications as indicated and Recommended continue with same treatment plan  The patient should continue treatment and follow-up with: As noted sugars are controlled  Patient continues to follow-up with endocrinology    I25 1 Arteriosclerotic cardiovascular disease  I have evaluated the patient and found the condition to be: Stable  I have evaluated the patient and: Adjusted medications as indicated and Recommended continue with same treatment plan  The patient should continue treatment and follow-up with: History of coronary artery disease  Continues to follow-up with cardiologist   Blood pressure and cholesterol are controlled    I10 Benign essential hypertension  I have evaluated the patient and found the condition to be: Stable  I have evaluated the patient and: Adjusted medications as indicated and Recommended continue with same treatment plan  The patient should continue treatment and follow-up with: Patient remains on ACE-inhibitor and blood pressure is controlled    Renal function is stable    G20 Parkinson's disease ([facility] )  I have evaluated the patient and found the condition to be: Stable  I have evaluated the patient and: Adjusted medications as indicated and Recommended continue with same treatment plan  The patient should continue treatment and follow-up with: History of Parkinson's disease  Patient is stable on present dose of Sinemet  Recently had increasing tremor when not taking his medication secondary to the COVID virus infection but now is back to normal no tremor on exam today, no cogwheeling    I25 10 Coronary artery disease involving native coronary artery of native heart  I have evaluated the patient and found the condition to be: Stable  I have evaluated the patient and: Adjusted medications as indicated and Recommended continue with same treatment plan  The patient should continue treatment and follow-up with: No chest pain or pressure or cardiac symptoms  Patient does follow with Cardiology and they do continue to monitor his heart function  No evidence of recurrence of disease  Patient continues with good control of his sugars, cholesterol and blood pressure    I73 9 Peripheral vascular disease, unspecified ([place] [address] )  I have evaluated the patient and found the condition to be: Stable  I have evaluated the patient and: Adjusted medications as indicated and Recommended continue with same treatment plan  The patient should continue treatment and follow-up with: No evidence of progression of disease  Patient will continue good control of his hypertension, hyperlipidemia, blood sugars  E78 5 Hyperlipidemia  I have evaluated the patient and found the condition to be: Stable  I have evaluated the patient and: Adjusted medications as indicated and Recommended continue with same treatment plan  The patient should continue treatment and follow-up with: Cholesterol is controlled  Patient will continue present medication    He is watching his diet    R00 1 Bradycardia  I have evaluated the patient and found the condition to be: Stable  I have evaluated the patient and: Adjusted medications as indicated and Recommended continue with same treatment plan  The patient should continue treatment and follow-up with: Chronic bradycardia  Continues to follow-up with his cardiologist to monitors his heart rate    N40 0 Enlarged prostate without lower urinary tract symptoms (luts)  I have evaluated the patient and found the condition to be: Stable  I have evaluated the patient and: Adjusted medications as indicated and Recommended continue with same treatment plan  The patient should continue treatment and follow-up with: No new urinary symptoms  Patient continues to follow-up with his urologist   No recent change in medication         Subjective:     Patient ID: Marbella Ingram  is a 81 y o  male     No chief complaint on file  27-year-old male history of extensive medical problems as outlined previously  Patient is here today for repeat Medicare wellness visit  Patient did have extensive lab testing over the past few months and has an upcoming appointment with his endocrinologist and further testing will be performed  Patient recently had COVID virus infection complicated by cough, respiratory compromise, fever, chills, loss of sense of smell and taste, diarrhea  Patient states this time he has fully recovered except for wife states some fatigue  Patient did undergo examination today in the office finding no new problems      Review of Systems   Constitutional: Positive for activity change (Slow but steady improvement and increasing activity level ), fatigue ( wife has noted some continued problems with fatigue but this seems improving) and unexpected weight change ( with COVID virus infection patient did lose approximately 20 lb but is regaining his normal weight)  Negative for appetite change, chills, diaphoresis and fever  HENT: Negative  Eyes: Negative      Respiratory: Positive for cough, shortness of breath ( was having some shortness of breath, cough and wheezing with COVID infection but now is back to normal he states) and wheezing  Negative for apnea, choking, chest tightness and stridor  Cardiovascular: Negative  Gastrointestinal: Negative  GI symptoms with COVID infection have resolved   Endocrine: Negative  Genitourinary: Negative  Musculoskeletal: Negative  States he has had complete resolution of arthralgias and myalgias from his COVID virus infection   Skin: Negative  Allergic/Immunologic: Negative  Neurological: Negative  Hematological: Negative  Psychiatric/Behavioral: Negative  Objective: There were no vitals taken for this visit  Problem List Items Addressed This Visit     None          Physical Exam  Vitals signs and nursing note reviewed  Constitutional:       General: He is not in acute distress  Appearance: Normal appearance  He is normal weight  He is not ill-appearing, toxic-appearing or diaphoretic  Comments: Pleasant, articulate, energetic 60-year-old male who is awake alert no acute distress and oriented x3   HENT:      Head: Normocephalic and atraumatic  Right Ear: Tympanic membrane, ear canal and external ear normal  There is no impacted cerumen  Left Ear: Tympanic membrane, ear canal and external ear normal  There is no impacted cerumen  Nose: Nose normal  No congestion or rhinorrhea  Mouth/Throat:      Mouth: Mucous membranes are moist       Pharynx: Oropharynx is clear  No oropharyngeal exudate or posterior oropharyngeal erythema  Eyes:      General: No scleral icterus  Right eye: No discharge  Left eye: No discharge  Extraocular Movements: Extraocular movements intact  Conjunctiva/sclera: Conjunctivae normal       Pupils: Pupils are equal, round, and reactive to light  Neck:      Musculoskeletal: Normal range of motion and neck supple   No neck rigidity or muscular tenderness  Vascular: No carotid bruit  Cardiovascular:      Rate and Rhythm: Regular rhythm  Bradycardia present  Pulses: Normal pulses  Heart sounds: Normal heart sounds  No murmur  No friction rub  No gallop  Pulmonary:      Effort: Pulmonary effort is normal  No respiratory distress  Breath sounds: Normal breath sounds  No stridor  No wheezing, rhonchi or rales  Chest:      Chest wall: No tenderness  Abdominal:      General: Abdomen is flat  Bowel sounds are normal  There is no distension  Palpations: Abdomen is soft  There is no mass  Tenderness: There is no abdominal tenderness  There is no right CVA tenderness, left CVA tenderness, guarding or rebound  Hernia: No hernia is present  Genitourinary:     Comments: Defers exam  Musculoskeletal: Normal range of motion  General: Deformity ( mild diffuse arthritic changes, flattening to his lumbar spine but full range of motion ) present  No swelling, tenderness or signs of injury  Right lower leg: No edema  Left lower leg: No edema  Lymphadenopathy:      Cervical: No cervical adenopathy  Skin:     General: Skin is warm and dry  Coloration: Skin is not jaundiced or pale  Findings: No bruising, erythema, lesion or rash  Neurological:      General: No focal deficit present  Mental Status: He is alert and oriented to person, place, and time  Mental status is at baseline  Cranial Nerves: No cranial nerve deficit  Sensory: No sensory deficit  Motor: No weakness  Coordination: Coordination normal       Gait: Gait normal       Deep Tendon Reflexes: Reflexes abnormal (Unable to obtain deep tendon reflexes patella and Achilles tendons bilaterally)  Comments: No tremor no cogwheeling no weakness   Psychiatric:         Mood and Affect: Mood normal          Behavior: Behavior normal          Thought Content:  Thought content normal          Judgment: Judgment normal

## 2021-03-16 ENCOUNTER — OFFICE VISIT (OUTPATIENT)
Dept: INTERNAL MEDICINE CLINIC | Facility: CLINIC | Age: 86
End: 2021-03-16
Payer: COMMERCIAL

## 2021-03-16 VITALS
WEIGHT: 163 LBS | DIASTOLIC BLOOD PRESSURE: 68 MMHG | OXYGEN SATURATION: 99 % | SYSTOLIC BLOOD PRESSURE: 124 MMHG | BODY MASS INDEX: 23.34 KG/M2 | HEIGHT: 70 IN | HEART RATE: 59 BPM | TEMPERATURE: 97.7 F

## 2021-03-16 DIAGNOSIS — E78.00 PURE HYPERCHOLESTEROLEMIA: ICD-10-CM

## 2021-03-16 DIAGNOSIS — I25.10 ARTERIOSCLEROTIC CARDIOVASCULAR DISEASE: Primary | ICD-10-CM

## 2021-03-16 DIAGNOSIS — I10 BENIGN ESSENTIAL HYPERTENSION: ICD-10-CM

## 2021-03-16 DIAGNOSIS — Z00.00 HEALTHCARE MAINTENANCE: ICD-10-CM

## 2021-03-16 DIAGNOSIS — U07.1 COVID-19: ICD-10-CM

## 2021-03-16 DIAGNOSIS — G20 PARKINSON'S DISEASE (HCC): ICD-10-CM

## 2021-03-16 PROCEDURE — 1160F RVW MEDS BY RX/DR IN RCRD: CPT | Performed by: INTERNAL MEDICINE

## 2021-03-16 PROCEDURE — 1170F FXNL STATUS ASSESSED: CPT | Performed by: INTERNAL MEDICINE

## 2021-03-16 PROCEDURE — 1036F TOBACCO NON-USER: CPT | Performed by: INTERNAL MEDICINE

## 2021-03-16 PROCEDURE — G0439 PPPS, SUBSEQ VISIT: HCPCS | Performed by: INTERNAL MEDICINE

## 2021-03-16 PROCEDURE — 3074F SYST BP LT 130 MM HG: CPT | Performed by: INTERNAL MEDICINE

## 2021-03-16 PROCEDURE — T1015 CLINIC SERVICE: HCPCS | Performed by: INTERNAL MEDICINE

## 2021-03-16 PROCEDURE — 1125F AMNT PAIN NOTED PAIN PRSNT: CPT | Performed by: INTERNAL MEDICINE

## 2021-03-16 PROCEDURE — 99214 OFFICE O/P EST MOD 30 MIN: CPT | Performed by: INTERNAL MEDICINE

## 2021-03-16 PROCEDURE — 3078F DIAST BP <80 MM HG: CPT | Performed by: INTERNAL MEDICINE

## 2021-03-16 PROCEDURE — 3288F FALL RISK ASSESSMENT DOCD: CPT | Performed by: INTERNAL MEDICINE

## 2021-03-16 NOTE — ASSESSMENT & PLAN NOTE
Patient has been diagnosed with Parkinson's disease  He remains on Albertine Loge levodopa which she states has shown significant benefit with treatment  States as long as he takes medication a has good control with no tremors  With recent COVID virus infection he was missing some doses of his Sinemet and was having some tremors but this is now resolved    Patient is fully functional and back to baseline

## 2021-03-16 NOTE — ASSESSMENT & PLAN NOTE
Patient does have a history of hyperlipidemia, coronary artery disease  Patient remains on simvastatin 40 mg daily and his cholesterol is showing adequate control    Patient is compliant with his diet

## 2021-03-16 NOTE — ASSESSMENT & PLAN NOTE
Patient is here today for repeat Medicare wellness visit  He has had extensive lab testing performed recently  Recent Stephy had diagnosis COVID virus infection with respiratory symptoms shortness of breath cough states this point time he is fully recovered  Wife states that when he the COVID virus infection he was not taking his Sinemet for his Parkinson's disease and did have some increasing tremor but this is now resolved  Patient states he is back to his activity but again the wife states that does have some slight fatigue but this is improving  Patient did undergo physical exam today in the office  Did do a colon rectal prostate exam but he continues to follow-up with his urologist and states there is no new urinary problems  Patient will continue present medication and he will continue to follow-up with his cardiologist, urologist, endocrinologist neurologist   We will continue to monitor along with his endocrinologist his sugar, cholesterol and blood pressure

## 2021-03-16 NOTE — ASSESSMENT & PLAN NOTE
Lab Results   Component Value Date    HGBA1C 6 6 (H) 09/25/2020   Patient does have a longstanding history of diabetes mellitus type 2  Remains on insulin dose as per his endocrinologist and has noted his blood sugars have shown good control  Patient does have history of peripheral vascular disease but states he is having no difficulties with claudication or leg cramps  Patient does have an upcoming visit to be seen by his endocrinologist and will have labs performed at that point time looking at can continued sugar control    Patient will continue present medication and surveillance

## 2021-03-16 NOTE — PROGRESS NOTES
Assessment and Plan:     Problem List Items Addressed This Visit     None           Preventive health issues were discussed with patient, and age appropriate screening tests were ordered as noted in patient's After Visit Summary  Personalized health advice and appropriate referrals for health education or preventive services given if needed, as noted in patient's After Visit Summary       History of Present Illness:     Patient presents for Medicare Annual Wellness visit    Patient Care Team:  Hernandez Ruiz DO as PCP - General (Internal Medicine)  Hernandez Ruiz DO as PCP - PCP-[facility] Attributed-Roster     Problem List:     Patient Active Problem List   Diagnosis    Arteriosclerotic cardiovascular disease    Benign essential hypertension    Controlled insulin dependent diabetes mellitus    Enlarged prostate without lower urinary tract symptoms (luts)    Hyperlipidemia    Dehydration    Coronary artery disease involving native coronary artery of native heart    Lactic acidosis    Fever    Dizziness    Other age-related cataract   [address] maintenance    Type 2 diabetes mellitus with diabetic peripheral 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 ([facility] [address] )    Peripheral vascular disease, unspecified ([facility] [address] )    Gastroenteritis    Blood in stool, mis    Tremor    Right arm weakness    Parkinson's disease ([facility] [address] )    Bradycardia    Shortness of breath    PANTOJA (dyspnea on exertion)    COVID-19      Past Medical and Surgical History:     Past Medical History:   Diagnosis Date    Diabetes mellitus ([address] )     Hypertension      Past Surgical History:   Procedure Laterality Date    CARDIAC SURGERY        Family History:     Family History   Problem Relation Age of Onset    Diabetes Mother     Heart attack Mother     Stroke Father     Hypertension Father       Social History:     E-Cigarette/Vaping    E-Cigarette Use Never User      E-Cigarette/Vaping Substances    Nicotine No     THC No     CBD No  Flavoring No     Other No     Unknown No      Social History     Socioeconomic History    Marital status: /Civil Union     Spouse name: None    Number of children: None    Years of education: None    Highest education level: None   Occupational History    None   Social Needs    Financial resource strain: None    Food insecurity     Worry: None     Inability: None    Transportation needs     Medical: None     Non-medical: None   Tobacco Use    Smoking status: Former Smoker     Years: 45 00     Types: Cigarettes    Smokeless tobacco: Never Used   Substance and Sexual Activity    Alcohol use: Yes     Comment: sparingly    Drug use: No    Sexual activity: None   Lifestyle    Physical activity     Days per week: None     Minutes per session: None    Stress: None   Relationships    Social connections     Talks on phone: None     Gets together: None     Attends Hindu service: None     Active member of club or organization: None     Attends meetings of clubs or organizations: None     Relationship status: None    Intimate partner violence     Fear of current or ex partner: None     Emotionally abused: None     Physically abused: None     Forced sexual activity: None   Other Topics Concern    None   Social History Narrative    None      Medications and Allergies:     Current Outpatient Medications   Medication Sig Dispense Refill    albuterol (PROVENTIL HFA,VENTOLIN HFA) 90 mcg/act inhaler Inhale 2 puffs every 6 (six) hours as needed for wheezing or shortness of breath 1 Inhaler 5    aspirin (ASPIR-81) 81 mg EC tablet Take 1 tablet by mouth daily      b complex vitamins capsule Take 1 capsule by mouth daily      carbidopa-levodopa (SINEMET)  mg per tablet Take 1 tablet by mouth 3 (three) times a day 270 tablet 3    cholecalciferol (VITAMIN D3) 1,000 units tablet Take 1,000 Units by mouth daily      Coenzyme Q10 (COQ10) 100 MG CAPS Take by mouth daily      insulin detemir (LEVEMIR) 100 units/mL subcutaneous injection Inject 27 Units under the skin daily at bedtime 15units Qam and 18units QHS      lisinopril (ZESTRIL) 10 mg tablet Take 10 mg by mouth daily        Multiple Vitamin (MULTIVITAMIN) tablet Take 1 tablet by mouth daily      multivitamin (THERAGRAN) TABS Take 1 tablet by mouth daily      Omega-3 Fatty Acids (FISH OIL) 1,000 mg Take 1,000 mg by mouth daily      ONE TOUCH ULTRA TEST test strip       simvastatin (ZOCOR) 40 mg tablet Take 40 mg by mouth daily at bedtime         No current facility-administered medications for this visit  Allergies   Allergen Reactions    Metformin GI Intolerance      Immunizations:     Immunization History   Administered Date(s) Administered    Influenza, high dose seasonal 0 7 mL 10/31/2019, 09/16/2020    SARS-CoV-2 / COVID-19 mRNA IM (Pfizer-BioNTech) 01/21/2021, 02/09/2021      Health Maintenance: There are no preventive care reminders to display for this patient  Topic Date Due    DTaP,Tdap,and Td Vaccines (1 - Tdap) 01/20/1956    Pneumococcal Vaccine: 65+ Years (1 of 1 - PPSV23) 01/20/2001      Medicare Health Risk Assessment:     /68   Pulse 59   Temp 97 7 °F (36 5 °C) (Tympanic)   Ht 5' 10" (1 778 m)   Wt 73 9 kg (163 lb)   SpO2 99%   BMI 23 39 kg/m²      Watson Braun is here for his Subsequent Wellness visit  Health Risk Assessment:   Patient rates overall health as good  Patient feels that their physical health rating is same  Patient is satisfied with their life  Eyesight was rated as slightly worse  Hearing was rated as slightly worse  Patient feels that their emotional and mental health rating is same  Patients states they are never, rarely angry  Patient states they are sometimes unusually tired/fatigued  Pain experienced in the last 7 days has been none  Patient states that he has experienced weight loss or gain in last 6 months       Depression Screening:   PHQ-2 Score: 0      Fall Risk Screening: In the past year, patient has experienced: no history of falling in past year      Home Safety:  Patient does not have trouble with stairs inside or outside of their home  Patient has working smoke alarms Home safety hazards include: none  Nutrition:   Current diet is Regular and Low Carb  Medications:   Patient is currently taking over-the-counter supplements  OTC medications include: see medication list  Patient is able to manage medications  Activities of Daily Living (ADLs)/Instrumental Activities of Daily Living (IADLs):   Walk and transfer into and out of bed and chair?: Yes  Dress and groom yourself?: Yes    Bathe or shower yourself?: Yes    Feed yourself? Yes  Do your laundry/housekeeping?: Yes  Manage your money, pay your bills and track your expenses?: No  Make your own meals?: Yes    Do your own shopping?: Yes    Previous Hospitalizations:   Any hospitalizations or ED visits within the last 12 months?: Yes    How many hospitalizations have you had in the last year?: 1-2    Advance Care Planning:   Living will: Yes    Durable POA for healthcare: Yes    Advanced directive: Yes      PREVENTIVE SCREENINGS      Cardiovascular Screening:    General: Screening Not Indicated and History Lipid Disorder      Diabetes Screening:     General: Screening Not Indicated and History Diabetes      Colorectal Cancer Screening:     General: Screening Not Indicated      Prostate Cancer Screening:    General: Screening Not Indicated      Abdominal Aortic Aneurysm (AAA) Screening:    Risk factors include: tobacco use        Lung Cancer Screening:     General: Screening Not Indicated    Screening, Brief Intervention, and Referral to Treatment (SBIRT)    Screening  Typical number of drinks in a day: 0  Typical number of drinks in a week: 0  Interpretation: Low risk drinking behavior      AUDIT-C Screenin) How often did you have a drink containing alcohol in the past year? 2 to 4 times a month  2) How many drinks did you have on a typical day when you were drinking in the past year?  1 to 2  3) How often did you have 6 or more drinks on one occasion in the past year? never    AUDIT-C Score: 2  Interpretation: Score 0-3 (male): Negative screen for alcohol misuse    Single Item Drug Screening:  How often have you used an illegal drug (including marijuana) or a prescription medication for non-medical reasons in the past year? never    Single Item Drug Screen Score: 0  Interpretation: Negative screen for possible drug use disorder      Leatha Mcgrath, DO

## 2021-03-16 NOTE — ASSESSMENT & PLAN NOTE
History of coronary artery disease  Patient does understand the importance of not only keeping his cholesterol under control but also his sugar and blood pressure  Patient will continue with present medication and surveillance  He is stable with no evidence of recurrence of disease at this time    Will continue to follow-up with cardiology

## 2021-03-16 NOTE — ASSESSMENT & PLAN NOTE
Patient was recently diagnosed with COVID virus with respiratory symptoms cough, shortness of breath, chest congestion, nausea, loose stools, diffuse arthralgias and myalgias  Patient has done well and feels like he is back to normal almost 100%  No increasing shortness of breath  O2 sat is stable at 99%    Patient states his appetite has returned and bowel function has gone back to normal

## 2021-03-16 NOTE — ASSESSMENT & PLAN NOTE
Patient does have a history of hypertension  He remains on lisinopril not only if to control his blood pressure but also because of diabetes  Patient is showing good control of his blood pressure with present treatment    Will continue present surveillance including keeping an eye on his kidney function which has been stable

## 2021-03-17 ENCOUNTER — APPOINTMENT (OUTPATIENT)
Dept: LAB | Facility: CLINIC | Age: 86
End: 2021-03-17
Payer: COMMERCIAL

## 2021-03-17 ENCOUNTER — TRANSCRIBE ORDERS (OUTPATIENT)
Dept: LAB | Facility: CLINIC | Age: 86
End: 2021-03-17

## 2021-03-17 DIAGNOSIS — E11.29 TYPE 2 DIABETES MELLITUS WITH OTHER KIDNEY COMPLICATION, UNSPECIFIED WHETHER LONG TERM INSULIN USE (HCC): ICD-10-CM

## 2021-03-17 DIAGNOSIS — E66.8 OBESITY OF ENDOCRINE ORIGIN: ICD-10-CM

## 2021-03-17 DIAGNOSIS — I51.9 MYXEDEMA HEART DISEASE: ICD-10-CM

## 2021-03-17 DIAGNOSIS — E11.9 DIABETES MELLITUS WITHOUT COMPLICATION (HCC): ICD-10-CM

## 2021-03-17 DIAGNOSIS — E03.9 MYXEDEMA HEART DISEASE: ICD-10-CM

## 2021-03-17 DIAGNOSIS — E03.9 MYXEDEMA HEART DISEASE: Primary | ICD-10-CM

## 2021-03-17 DIAGNOSIS — I51.9 MYXEDEMA HEART DISEASE: Primary | ICD-10-CM

## 2021-03-17 LAB
ALBUMIN SERPL BCP-MCNC: 3.5 G/DL (ref 3.5–5)
ALP SERPL-CCNC: 76 U/L (ref 46–116)
ALT SERPL W P-5'-P-CCNC: 15 U/L (ref 12–78)
ANION GAP SERPL CALCULATED.3IONS-SCNC: 4 MMOL/L (ref 4–13)
AST SERPL W P-5'-P-CCNC: 24 U/L (ref 5–45)
BASOPHILS # BLD AUTO: 0.07 THOUSANDS/ΜL (ref 0–0.1)
BASOPHILS NFR BLD AUTO: 1 % (ref 0–1)
BILIRUB SERPL-MCNC: 0.71 MG/DL (ref 0.2–1)
BILIRUB UR QL STRIP: NEGATIVE
BUN SERPL-MCNC: 14 MG/DL (ref 5–25)
CALCIUM SERPL-MCNC: 9.3 MG/DL (ref 8.3–10.1)
CHLORIDE SERPL-SCNC: 106 MMOL/L (ref 100–108)
CLARITY UR: CLEAR
CO2 SERPL-SCNC: 30 MMOL/L (ref 21–32)
COLOR UR: YELLOW
CREAT SERPL-MCNC: 1.26 MG/DL (ref 0.6–1.3)
CREAT UR-MCNC: 122 MG/DL
EOSINOPHIL # BLD AUTO: 0.13 THOUSAND/ΜL (ref 0–0.61)
EOSINOPHIL NFR BLD AUTO: 1 % (ref 0–6)
ERYTHROCYTE [DISTWIDTH] IN BLOOD BY AUTOMATED COUNT: 15.6 % (ref 11.6–15.1)
EST. AVERAGE GLUCOSE BLD GHB EST-MCNC: 140 MG/DL
GFR SERPL CREATININE-BSD FRML MDRD: 52 ML/MIN/1.73SQ M
GLUCOSE P FAST SERPL-MCNC: 162 MG/DL (ref 65–99)
GLUCOSE UR STRIP-MCNC: NEGATIVE MG/DL
HBA1C MFR BLD: 6.5 %
HCT VFR BLD AUTO: 42.8 % (ref 36.5–49.3)
HGB BLD-MCNC: 14.1 G/DL (ref 12–17)
HGB UR QL STRIP.AUTO: NEGATIVE
IMM GRANULOCYTES # BLD AUTO: 0.04 THOUSAND/UL (ref 0–0.2)
IMM GRANULOCYTES NFR BLD AUTO: 0 % (ref 0–2)
KETONES UR STRIP-MCNC: NEGATIVE MG/DL
LEUKOCYTE ESTERASE UR QL STRIP: NEGATIVE
LYMPHOCYTES # BLD AUTO: 2.06 THOUSANDS/ΜL (ref 0.6–4.47)
LYMPHOCYTES NFR BLD AUTO: 23 % (ref 14–44)
MAGNESIUM SERPL-MCNC: 2.1 MG/DL (ref 1.6–2.6)
MCH RBC QN AUTO: 30.7 PG (ref 26.8–34.3)
MCHC RBC AUTO-ENTMCNC: 32.9 G/DL (ref 31.4–37.4)
MCV RBC AUTO: 93 FL (ref 82–98)
MICROALBUMIN UR-MCNC: 20.1 MG/L (ref 0–20)
MICROALBUMIN/CREAT 24H UR: 16 MG/G CREATININE (ref 0–30)
MONOCYTES # BLD AUTO: 0.56 THOUSAND/ΜL (ref 0.17–1.22)
MONOCYTES NFR BLD AUTO: 6 % (ref 4–12)
NEUTROPHILS # BLD AUTO: 6.31 THOUSANDS/ΜL (ref 1.85–7.62)
NEUTS SEG NFR BLD AUTO: 69 % (ref 43–75)
NITRITE UR QL STRIP: NEGATIVE
NRBC BLD AUTO-RTO: 0 /100 WBCS
PH UR STRIP.AUTO: 6.5 [PH]
PHOSPHATE SERPL-MCNC: 3.2 MG/DL (ref 2.3–4.1)
PLATELET # BLD AUTO: 197 THOUSANDS/UL (ref 149–390)
PMV BLD AUTO: 10.5 FL (ref 8.9–12.7)
POTASSIUM SERPL-SCNC: 4.6 MMOL/L (ref 3.5–5.3)
PROT SERPL-MCNC: 7.3 G/DL (ref 6.4–8.2)
PROT UR STRIP-MCNC: NEGATIVE MG/DL
RBC # BLD AUTO: 4.59 MILLION/UL (ref 3.88–5.62)
SODIUM SERPL-SCNC: 140 MMOL/L (ref 136–145)
SP GR UR STRIP.AUTO: 1.01 (ref 1–1.03)
T4 FREE SERPL-MCNC: 0.92 NG/DL (ref 0.76–1.46)
TSH SERPL DL<=0.05 MIU/L-ACNC: 3.82 UIU/ML (ref 0.36–3.74)
UROBILINOGEN UR QL STRIP.AUTO: 0.2 E.U./DL
WBC # BLD AUTO: 9.17 THOUSAND/UL (ref 4.31–10.16)

## 2021-03-17 PROCEDURE — 84443 ASSAY THYROID STIM HORMONE: CPT

## 2021-03-17 PROCEDURE — 83735 ASSAY OF MAGNESIUM: CPT

## 2021-03-17 PROCEDURE — 36415 COLL VENOUS BLD VENIPUNCTURE: CPT

## 2021-03-17 PROCEDURE — 84100 ASSAY OF PHOSPHORUS: CPT

## 2021-03-17 PROCEDURE — 82043 UR ALBUMIN QUANTITATIVE: CPT

## 2021-03-17 PROCEDURE — 85025 COMPLETE CBC W/AUTO DIFF WBC: CPT

## 2021-03-17 PROCEDURE — 83036 HEMOGLOBIN GLYCOSYLATED A1C: CPT

## 2021-03-17 PROCEDURE — 84439 ASSAY OF FREE THYROXINE: CPT

## 2021-03-17 PROCEDURE — 80053 COMPREHEN METABOLIC PANEL: CPT

## 2021-03-17 PROCEDURE — 82570 ASSAY OF URINE CREATININE: CPT

## 2021-03-17 PROCEDURE — 81003 URINALYSIS AUTO W/O SCOPE: CPT

## 2021-07-15 ENCOUNTER — RA CDI HCC (OUTPATIENT)
Dept: OTHER | Facility: HOSPITAL | Age: 86
End: 2021-07-15

## 2021-07-15 NOTE — PROGRESS NOTES
[facility]  coding opportunities             Chart reviewed, (number of) suggestions sent to provider: 2                  Patients insurance company: [employer] (Medicare Advantage and [employer])     Visit status: Patient canceled the appointment        [facility]  coding opportunities             Chart reviewed, (number of) suggestions sent to provider: 2     [place] [id number]  coding opportunities             Chart reviewed, (number of) suggestions sent to provider: 2   DX:  E11 51-Type 2 diabetes mellitus with diabetic peripheral 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  Z79 4-Long term (current) use of insulin      Noted bpa info and card   From other card notes has sss-holter monitor not showing this per results documented-unable to view rhythm strips-is more bradycardia--unsure have enough to ask for this currently and does not have a pacemaker currently LM               Patients insurance company: [employer] (Medicare Advantage and [employer])                          Patients insurance company: [employer] (Medicare Advantage and [employer])

## 2021-07-20 ENCOUNTER — OFFICE VISIT (OUTPATIENT)
Dept: NEUROLOGY | Facility: CLINIC | Age: 86
End: 2021-07-20
Payer: COMMERCIAL

## 2021-07-20 VITALS
BODY MASS INDEX: 24.34 KG/M2 | DIASTOLIC BLOOD PRESSURE: 70 MMHG | SYSTOLIC BLOOD PRESSURE: 118 MMHG | HEART RATE: 48 BPM | WEIGHT: 170 LBS | HEIGHT: 70 IN

## 2021-07-20 DIAGNOSIS — G20 PARKINSON'S DISEASE (HCC): ICD-10-CM

## 2021-07-20 PROCEDURE — 99214 OFFICE O/P EST MOD 30 MIN: CPT | Performed by: PHYSICIAN ASSISTANT

## 2021-07-20 NOTE — PROGRESS NOTES
Patient ID: Braeden Rubin  is a 81 y o  male  Assessment/Plan:    Parkinson's disease [facility])  Patient continues to do well on low-dose Sinemet  He feels he is functioning well with no evidence of progression  His exam is overall stable  At this point will have her remain on his current dose of Sinemet  He has no clear complaints of wearing off  He was encouraged to remain active  He does continue to go bowling 3 times a week and does yard work at home  Subjective:    Braeden Rubin  is an 81 y o  male with CAD, HTN, DM, PVD, HLD and probable Parkinson's disease who presents for follow up  To review, he initially presented for evaluation of bilateral hand tremor  Symptom onset around 2018 with significant improvement per his wife with the initiation of carbidopa/levodopa  On initial presentation he had minimal parkinsonism but he was noted to have rare right-sided myoclonus of the arm and leg  At his last visit he continued to do well on Sinemet and no changes were made  INTERVAL HISTORY:  He feels that he is still doing well  No new symptoms  Feels overall stable  Tremors are well controlled with the medication   He can perform all of his ADLs on his own  He still goes bowling 3 times a week  He still drives without issues  No issues with swallowing   He does wake 1-2 times a night to use the bathroom, he can fall back to sleep  He feels that his memory is stable   No hallucinations   He uses the push mower at home   No falls  Current medications and timing:  Sinemet 25/100 1 tab TID @ 9:30 3:30 9:30       I personally reviewed and updated the ROS  Objective:    Blood pressure 118/70, pulse (!) 48, height 5' 10" (1 778 m), weight 77 1 kg (170 lb)  Physical Exam  Constitutional:       Appearance: Normal appearance     HENT:      Right Ear: Hearing normal       Left Ear: Hearing normal    Eyes:      General: Lids are normal       Extraocular Movements: Extraocular movements intact  Pupils: Pupils are equal, round, and reactive to light  Pulmonary:      Effort: Pulmonary effort is normal    Neurological:      Mental Status: He is alert  Deep Tendon Reflexes: Strength normal    Psychiatric:         Speech: Speech normal          Neurological Exam  Mental Status  Alert  Oriented to person, place and time  Speech is normal     Cranial Nerves  CN III, IV, VI: Extraocular movements intact bilaterally  Normal lids and orbits bilaterally  Pupils equal round and reactive to light bilaterally  CN V:  Right: Facial sensation is normal   Left: Facial sensation is normal on the left  CN VIII:  Right: Hearing is normal   Left: Hearing is normal   CN XI: Shoulder shrug strength is normal   CN XII: Tongue midline without atrophy or fasciculations  Motor   Strength is 5/5 throughout all four extremities  Sensory  Light touch is normal in upper and lower extremities  Coordination  Right: Finger-to-nose abnormality:  Left: Finger-to-nose abnormality:    Gait  Arose without issues  Slightly decreased stride, slightly wider based gait  Stooped posture  [place]     UPDRS motor:                              Time since last dose:  7/20/2 1/26/21   Speech  0  0   Facial Expression       Rigidity - Neck       Rigidity - Upper Extremity (Right)  0  0   Rigidity - Upper Extremity (Left)   1  1   Rigidity - Lower Extremity (Right)  1  0   Rigidity - Lower Extremity (Left)   0  0   Finger Taps (Right)   1  1   Finger Taps (Left)   2  2   Hand Movement (Right)  1  1   Hand Movement (Left)   2  2   Pronation/Supination (Right)  0  0   Pronation/Supination (Left)   1  1   Toe Tapping (Right) 1  0   Toe Tapping (Left) 1  0   Leg Agility (Right)  0  0   Leg Agility (Left)   0 0   Arising from Chair   0  0   Gait   1  1   Freezing of Gait 0  0   Postural Stability         Posture 1  1   Global spontaneity of movement 1  0   Postural Tremor (Right) 0  0   Postural Tremor (Left) 0

## 2021-07-20 NOTE — ASSESSMENT & PLAN NOTE
Patient continues to do well on low-dose Sinemet  He feels he is functioning well with no evidence of progression  His exam is overall stable  At this point will have her remain on his current dose of Sinemet  He has no clear complaints of wearing off  He was encouraged to remain active  He does continue to go bowling 3 times a week and does yard work at home

## 2021-07-29 ENCOUNTER — RA CDI HCC (OUTPATIENT)
Dept: OTHER | Facility: HOSPITAL | Age: 86
End: 2021-07-29

## 2021-07-29 NOTE — PROGRESS NOTES
[employer]  coding opportunities             Chart reviewed, (number of) suggestions sent to provider: 2            Number of suggestions actually used: 1      Number of suggestions NOT actually used: 1     Patients insurance company: [employer] (Medic[employer] Advantage and Commercial)   dx not on bill:   Visit status: Patient arrived for their scheduled appointment     Provider never responded to [employer]  coding request     [employer]  coding opportunities             Chart reviewed, (number of) suggestions sent to provider: 2       DX:  E11 51-Type 2 diabetes mellitus with diabetic peripheral 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  Z79 4-Long term (current) use of insulin        Noted bpa info and card   From other card notes has sss-holter monitor not showing this per results documented-unable to view rhythm strips-is more bradycardia--unsure have enough to ask for this currently and does not have a pacemaker currently LM                       Patients insurance company: [employer]

## 2021-08-05 ENCOUNTER — OFFICE VISIT (OUTPATIENT)
Dept: INTERNAL MEDICINE CLINIC | Facility: CLINIC | Age: 86
End: 2021-08-05
Payer: COMMERCIAL

## 2021-08-05 VITALS
DIASTOLIC BLOOD PRESSURE: 62 MMHG | HEART RATE: 52 BPM | WEIGHT: 167 LBS | SYSTOLIC BLOOD PRESSURE: 116 MMHG | TEMPERATURE: 97.8 F | BODY MASS INDEX: 23.91 KG/M2 | OXYGEN SATURATION: 98 % | HEIGHT: 70 IN

## 2021-08-05 DIAGNOSIS — N40.0 ENLARGED PROSTATE WITHOUT LOWER URINARY TRACT SYMPTOMS (LUTS): ICD-10-CM

## 2021-08-05 DIAGNOSIS — R00.1 BRADYCARDIA: ICD-10-CM

## 2021-08-05 DIAGNOSIS — I25.10 ARTERIOSCLEROTIC CARDIOVASCULAR DISEASE: Primary | ICD-10-CM

## 2021-08-05 DIAGNOSIS — E78.00 PURE HYPERCHOLESTEROLEMIA: ICD-10-CM

## 2021-08-05 DIAGNOSIS — E11.9 TYPE 2 DIABETES MELLITUS WITHOUT COMPLICATION, WITH LONG-TERM CURRENT USE OF INSULIN (HCC): ICD-10-CM

## 2021-08-05 DIAGNOSIS — I10 BENIGN ESSENTIAL HYPERTENSION: ICD-10-CM

## 2021-08-05 DIAGNOSIS — Z79.4 TYPE 2 DIABETES MELLITUS WITHOUT COMPLICATION, WITH LONG-TERM CURRENT USE OF INSULIN (HCC): ICD-10-CM

## 2021-08-05 DIAGNOSIS — G20 PARKINSON'S DISEASE (HCC): ICD-10-CM

## 2021-08-05 PROCEDURE — 3078F DIAST BP <80 MM HG: CPT | Performed by: INTERNAL MEDICINE

## 2021-08-05 PROCEDURE — 1036F TOBACCO NON-USER: CPT | Performed by: INTERNAL MEDICINE

## 2021-08-05 PROCEDURE — 1160F RVW MEDS BY RX/DR IN RCRD: CPT | Performed by: INTERNAL MEDICINE

## 2021-08-05 PROCEDURE — 3074F SYST BP LT 130 MM HG: CPT | Performed by: INTERNAL MEDICINE

## 2021-08-05 PROCEDURE — 99214 OFFICE O/P EST MOD 30 MIN: CPT | Performed by: INTERNAL MEDICINE

## 2021-08-05 NOTE — ASSESSMENT & PLAN NOTE
Patient does have a history of atherosclerotic cardiovascular disease history of MI in the past   Patient remains a statin  Continues to follow-up with  His cardiologist  Denies any cardiac symptoms no chest pain or pressure no increasing shortness breath exertion

## 2021-08-05 NOTE — ASSESSMENT & PLAN NOTE
History of hypertension  With the patient having a diagnosis of diabetes he is on a ACE-inhibitor  Blood pressure showing excellent control  Patient will continue present medication and he recently has studies looking at his renal function which again is stable

## 2021-08-05 NOTE — PROGRESS NOTES
Assessment/Plan:    Arteriosclerotic cardiovascular disease    Patient does have a history of atherosclerotic cardiovascular disease history of MI in the past   Patient remains a statin  Continues to follow-up with  His cardiologist  Denies any cardiac symptoms no chest pain or pressure no increasing shortness breath exertion  Benign essential hypertension    History of hypertension  With the patient having a diagnosis of diabetes he is on a ACE-inhibitor  Blood pressure showing excellent control  Patient will continue present medication and he recently has studies looking at his renal function which again is stable  Bradycardia    Bradycardia chronic, has continue to follow-up with his cardiologist   Denies any lightheadedness dizziness no syncopal episodes  Diabetes mellitus ([facility] )    Lab Results   Component Value Date    HGBA1C 6 5 (H) 03/17/2021    as noted with his last lab testing hemoglobin A1c was 6 5 which shows excellent control of his diabetes  Continues to follow-up with his endocrinologist and he has had no recent just meant to his medication, treatment  Patient recently was seen by his endocrinologist and did have full labs performed looking at a fasting blood sugar, hemoglobin A1c, urine for microalbumin, he is up-to-date with diabetic eye exam diabetic foot exam     Enlarged prostate without lower urinary tract symptoms (luts)    Despite his age his urologist continues to monitor his PSA  Denies any new urinary problems or complaints  Hyperlipidemia    Cholesterol is under control  Patient remains on Zocor 40 milligrams a day  States that he watches his diet closely and limits his intake of fats and cholesterol with his diet, weight has been stable    Parkinson's disease ([facility] )   Patient does have a diagnosis of Parkinson's disease  Remains on Sinemet and continues to follow-up with Neurology  At this point time he is stable from a neurologic standpoint    No tremor on evaluation today and he states he is back to his normal activity including bowling       Diagnoses and all orders for this visit:    Arteriosclerotic cardiovascular disease    Benign essential hypertension    Bradycardia    Type 2 diabetes mellitus without complication, with long-term current use of insulin (HCC)    Enlarged prostate without lower urinary tract symptoms (luts)    Pure hypercholesterolemia    Parkinson's disease (HCC)          Subjective:      Patient ID: Cherelle Garcia  is a 81 y o  male  Patient is a 71-year-old male history of multiple problems including hypertension, hyperlipidemia, coronary artery disease, peripheral vascular disease, diabetes mellitus type 2, Parkinson's disease  Patient is here today for routine follow-up accompanied by his wife  States he is feeling well and remains active and he offers no new complaints or concerns  He does continue to follow-up with his specialists including his endocrinologist to recently sent the patient for      The following portions of the patient's history were reviewed and updated as appropriate:   He  has a past medical history of Diabetes mellitus ([facility] ) and Hypertension    He   Patient Active Problem List    Diagnosis Date Noted    COVID-19 03/05/2021    PANTOJA (dyspnea on exertion) 02/26/2021    Shortness of breath 10/31/2019    Bradycardia 09/18/2019    Parkinson's disease ([facility] ) 09/05/2019    Blood in stool, mis 08/13/2019    Tremor 08/13/2019    Right arm weakness 08/13/2019    Gastroenteritis 08/06/2019    Type 2 diabetes mellitus with diabetic peripheral 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 ([facility] ) 03/27/2019    Peripheral vascular disease, unspecified ([facility] ) 03/27/2019    Healthcare maintenance 10/04/2018    Other age-related cataract 10/01/2018    Dehydration 08/23/2018    Coronary artery disease involving native coronary artery of native heart 08/23/2018    Lactic acidosis 08/23/2018    Fever 08/23/2018    Dizziness 08/23/2018    Arteriosclerotic cardiovascular disease 02/07/2013    Benign essential hypertension 02/07/2013    Diabetes mellitus ([facility] ) 02/07/2013    Enlarged prostate without lower urinary tract symptoms (luts) 02/07/2013    Hyperlipidemia 02/07/2013     He  has a past surgical history that includes Cardiac surgery  His family history includes Diabetes in his mother; Heart attack in his mother; Hypertension in his father; Stroke in his father  He  reports that he has quit smoking  His smoking use included cigarettes  He quit after 45 00 years of use  He has never used smokeless tobacco  He reports current alcohol use  He reports that he does not use drugs  Current Outpatient Medications   Medication Sig Dispense Refill    aspirin (ASPIR-81) 81 mg EC tablet Take 1 tablet by mouth daily      b complex vitamins capsule Take 1 capsule by mouth daily      carbidopa-levodopa (SINEMET)  mg per tablet Take 1 tablet by mouth 3 (three) times a day 270 tablet 3    cholecalciferol (VITAMIN D3) 1,000 units tablet Take 1,000 Units by mouth daily      Coenzyme Q10 (COQ10) 100 MG CAPS Take by mouth daily      insulin detemir (LEVEMIR) 100 units/mL subcutaneous injection Inject 27 Units under the skin daily at bedtime 15units Qam and 18units QHS      lisinopril (ZESTRIL) 10 mg tablet Take 10 mg by mouth daily        Multiple Vitamin (MULTIVITAMIN) tablet Take 1 tablet by mouth daily      multivitamin (THERAGRAN) TABS Take 1 tablet by mouth daily      Omega-3 Fatty Acids (FISH OIL) 1,000 mg Take 1,000 mg by mouth daily      ONE TOUCH ULTRA TEST test strip       simvastatin (ZOCOR) 40 mg tablet Take 40 mg by mouth daily at bedtime        albuterol (PROVENTIL HFA,VENTOLIN HFA) 90 mcg/act inhaler Inhale 2 puffs every 6 (six) hours as needed for wheezing or shortness of breath (Patient not taking: Reported on 8/5/2021) 1 Inhaler 5     No current facility-administered medications for this visit       Current Outpatient Medications on File Prior to Visit   Medication Sig    aspirin (ASPIR-81) 81 mg EC tablet Take 1 tablet by mouth daily    b complex vitamins capsule Take 1 capsule by mouth daily    carbidopa-levodopa (SINEMET)  mg per tablet Take 1 tablet by mouth 3 (three) times a day    cholecalciferol (VITAMIN D3) 1,000 units tablet Take 1,000 Units by mouth daily    Coenzyme Q10 (COQ10) 100 MG CAPS Take by mouth daily    insulin detemir (LEVEMIR) 100 units/mL subcutaneous injection Inject 27 Units under the skin daily at bedtime 15units Qam and 18units QHS    lisinopril (ZESTRIL) 10 mg tablet Take 10 mg by mouth daily      Multiple Vitamin (MULTIVITAMIN) tablet Take 1 tablet by mouth daily    multivitamin (THERAGRAN) TABS Take 1 tablet by mouth daily    Omega-3 Fatty Acids (FISH OIL) 1,000 mg Take 1,000 mg by mouth daily    ONE TOUCH ULTRA TEST test strip     simvastatin (ZOCOR) 40 mg tablet Take 40 mg by mouth daily at bedtime      albuterol (PROVENTIL HFA,VENTOLIN HFA) 90 mcg/act inhaler Inhale 2 puffs every 6 (six) hours as needed for wheezing or shortness of breath (Patient not taking: Reported on 8/5/2021)     No current facility-administered medications on file prior to visit  He is allergic to metformin       Review of Systems   Constitutional: Negative  HENT: Negative  Eyes: Negative  Respiratory: Negative  Cardiovascular: Negative  Gastrointestinal: Negative  Endocrine: Negative  Genitourinary: Negative  Musculoskeletal: Negative  Skin: Negative  Allergic/Immunologic: Negative  Neurological: Negative  Hematological: Negative  Psychiatric/Behavioral: Negative  Objective:      /62   Pulse (!) 52   Temp 97 8 °F (36 6 °C)   Ht 5' 10" (1 778 m)   Wt 75 8 kg (167 lb)   SpO2 98%   BMI 23 96 kg/m²          Physical Exam  Vitals and nursing note reviewed  Constitutional:       General: He is not in acute distress       Appearance: Normal appearance  He is normal weight  He is not ill-appearing, toxic-appearing or diaphoretic  Comments: Pleasant, cheerful 44-year-old male who is awake alert no acute distress and oriented x3   HENT:      Head: Normocephalic and atraumatic  Right Ear: Tympanic membrane, ear canal and external ear normal  There is no impacted cerumen  Left Ear: Tympanic membrane, ear canal and external ear normal  There is no impacted cerumen  Nose: Nose normal  No congestion or rhinorrhea  Mouth/Throat:      Mouth: Mucous membranes are moist       Pharynx: Oropharynx is clear  No oropharyngeal exudate or posterior oropharyngeal erythema  Eyes:      General: No scleral icterus  Right eye: No discharge  Left eye: No discharge  Extraocular Movements: Extraocular movements intact  Conjunctiva/sclera: Conjunctivae normal       Pupils: Pupils are equal, round, and reactive to light  Neck:      Vascular: No carotid bruit  Cardiovascular:      Rate and Rhythm: Normal rate and regular rhythm  Pulses: Normal pulses  Heart sounds: Normal heart sounds  No murmur heard  No friction rub  No gallop  Pulmonary:      Effort: Pulmonary effort is normal  No respiratory distress  Breath sounds: Normal breath sounds  No stridor  No wheezing, rhonchi or rales  Chest:      Chest wall: No tenderness  Abdominal:      General: Abdomen is flat  Bowel sounds are normal  There is no distension  Palpations: Abdomen is soft  There is no mass  Tenderness: There is no abdominal tenderness  There is no right CVA tenderness, left CVA tenderness, guarding or rebound  Hernia: No hernia is present  Musculoskeletal:         General: Deformity (  Minor diffuse arthritic changes but nothing acute) present  No swelling, tenderness or signs of injury  Normal range of motion  Cervical back: Normal range of motion and neck supple  No rigidity or tenderness        Right lower leg: No edema  Left lower leg: No edema  Lymphadenopathy:      Cervical: No cervical adenopathy  Skin:     General: Skin is warm and dry  Coloration: Skin is not jaundiced or pale  Findings: No bruising, erythema, lesion or rash  Neurological:      Mental Status: He is alert and oriented to person, place, and time  Mental status is at baseline  Cranial Nerves: No cranial nerve deficit  Sensory: Sensory deficit present  Motor: No weakness  Coordination: Coordination normal       Gait: Gait normal       Deep Tendon Reflexes: Reflexes abnormal       Comments: No tremor no rigidity no difficulties with gait, slightly decreased patella and Achilles tendon reflexes bilaterally  No weakness to upper lower extremities or change with muscle strength    Psychiatric:         Mood and Affect: Mood normal          Behavior: Behavior normal          Thought Content:  Thought content normal          Judgment: Judgment normal

## 2021-08-05 NOTE — ASSESSMENT & PLAN NOTE
Lab Results   Component Value Date    HGBA1C 6 5 (H) 03/17/2021    as noted with his last lab testing hemoglobin A1c was 6 5 which shows excellent control of his diabetes  Continues to follow-up with his endocrinologist and he has had no recent just meant to his medication, treatment    Patient recently was seen by his endocrinologist and did have full labs performed looking at a fasting blood sugar, hemoglobin A1c, urine for microalbumin, he is up-to-date with diabetic eye exam diabetic foot exam

## 2021-08-05 NOTE — ASSESSMENT & PLAN NOTE
Despite his age his urologist continues to monitor his PSA  Denies any new urinary problems or complaints

## 2021-08-05 NOTE — ASSESSMENT & PLAN NOTE
Cholesterol is under control  Patient remains on Zocor 40 milligrams a day    States that he watches his diet closely and limits his intake of fats and cholesterol with his diet, weight has been stable

## 2021-08-05 NOTE — ASSESSMENT & PLAN NOTE
Patient does have a diagnosis of Parkinson's disease  Remains on Sinemet and continues to follow-up with Neurology  At this point time he is stable from a neurologic standpoint    No tremor on evaluation today and he states he is back to his normal activity including bowling

## 2021-08-05 NOTE — ASSESSMENT & PLAN NOTE
Bradycardia chronic, has continue to follow-up with his cardiologist   Denies any lightheadedness dizziness no syncopal episodes

## 2021-08-30 ENCOUNTER — APPOINTMENT (OUTPATIENT)
Dept: LAB | Facility: CLINIC | Age: 86
End: 2021-08-30
Payer: COMMERCIAL

## 2021-08-30 DIAGNOSIS — I51.9 MYXEDEMA HEART DISEASE: ICD-10-CM

## 2021-08-30 DIAGNOSIS — E03.9 MYXEDEMA HEART DISEASE: ICD-10-CM

## 2021-08-30 DIAGNOSIS — E11.9 DIABETES MELLITUS WITHOUT COMPLICATION (HCC): ICD-10-CM

## 2021-08-30 LAB
ALBUMIN SERPL BCP-MCNC: 3.8 G/DL (ref 3.5–5)
ALP SERPL-CCNC: 70 U/L (ref 46–116)
ALT SERPL W P-5'-P-CCNC: 20 U/L (ref 12–78)
ANION GAP SERPL CALCULATED.3IONS-SCNC: 2 MMOL/L (ref 4–13)
AST SERPL W P-5'-P-CCNC: 21 U/L (ref 5–45)
BASOPHILS # BLD AUTO: 0.03 THOUSANDS/ΜL (ref 0–0.1)
BASOPHILS NFR BLD AUTO: 0 % (ref 0–1)
BILIRUB SERPL-MCNC: 0.71 MG/DL (ref 0.2–1)
BUN SERPL-MCNC: 25 MG/DL (ref 5–25)
CALCIUM SERPL-MCNC: 9.1 MG/DL (ref 8.3–10.1)
CHLORIDE SERPL-SCNC: 109 MMOL/L (ref 100–108)
CO2 SERPL-SCNC: 30 MMOL/L (ref 21–32)
CORTIS AM PEAK SERPL-MCNC: 17.6 UG/DL (ref 4.2–22.4)
CREAT SERPL-MCNC: 1.46 MG/DL (ref 0.6–1.3)
EOSINOPHIL # BLD AUTO: 0.33 THOUSAND/ΜL (ref 0–0.61)
EOSINOPHIL NFR BLD AUTO: 5 % (ref 0–6)
ERYTHROCYTE [DISTWIDTH] IN BLOOD BY AUTOMATED COUNT: 13.2 % (ref 11.6–15.1)
EST. AVERAGE GLUCOSE BLD GHB EST-MCNC: 128 MG/DL
GFR SERPL CREATININE-BSD FRML MDRD: 43 ML/MIN/1.73SQ M
GLUCOSE P FAST SERPL-MCNC: 114 MG/DL (ref 65–99)
HBA1C MFR BLD: 6.1 %
HCT VFR BLD AUTO: 44.8 % (ref 36.5–49.3)
HGB BLD-MCNC: 14.9 G/DL (ref 12–17)
IMM GRANULOCYTES # BLD AUTO: 0.02 THOUSAND/UL (ref 0–0.2)
IMM GRANULOCYTES NFR BLD AUTO: 0 % (ref 0–2)
LYMPHOCYTES # BLD AUTO: 1.77 THOUSANDS/ΜL (ref 0.6–4.47)
LYMPHOCYTES NFR BLD AUTO: 24 % (ref 14–44)
MAGNESIUM SERPL-MCNC: 2.3 MG/DL (ref 1.6–2.6)
MCH RBC QN AUTO: 30.7 PG (ref 26.8–34.3)
MCHC RBC AUTO-ENTMCNC: 33.3 G/DL (ref 31.4–37.4)
MCV RBC AUTO: 92 FL (ref 82–98)
MONOCYTES # BLD AUTO: 0.49 THOUSAND/ΜL (ref 0.17–1.22)
MONOCYTES NFR BLD AUTO: 7 % (ref 4–12)
NEUTROPHILS # BLD AUTO: 4.67 THOUSANDS/ΜL (ref 1.85–7.62)
NEUTS SEG NFR BLD AUTO: 64 % (ref 43–75)
NRBC BLD AUTO-RTO: 0 /100 WBCS
PHOSPHATE SERPL-MCNC: 3 MG/DL (ref 2.3–4.1)
PLATELET # BLD AUTO: 185 THOUSANDS/UL (ref 149–390)
PMV BLD AUTO: 10.9 FL (ref 8.9–12.7)
POTASSIUM SERPL-SCNC: 4.5 MMOL/L (ref 3.5–5.3)
PROT SERPL-MCNC: 7.6 G/DL (ref 6.4–8.2)
RBC # BLD AUTO: 4.85 MILLION/UL (ref 3.88–5.62)
SODIUM SERPL-SCNC: 141 MMOL/L (ref 136–145)
T4 FREE SERPL-MCNC: 0.82 NG/DL (ref 0.76–1.46)
TSH SERPL DL<=0.05 MIU/L-ACNC: 4.91 UIU/ML (ref 0.36–3.74)
WBC # BLD AUTO: 7.31 THOUSAND/UL (ref 4.31–10.16)

## 2021-08-30 PROCEDURE — 80053 COMPREHEN METABOLIC PANEL: CPT

## 2021-08-30 PROCEDURE — 36415 COLL VENOUS BLD VENIPUNCTURE: CPT

## 2021-08-30 PROCEDURE — 84439 ASSAY OF FREE THYROXINE: CPT

## 2021-08-30 PROCEDURE — 83735 ASSAY OF MAGNESIUM: CPT

## 2021-08-30 PROCEDURE — 84100 ASSAY OF PHOSPHORUS: CPT

## 2021-08-30 PROCEDURE — 82533 TOTAL CORTISOL: CPT

## 2021-08-30 PROCEDURE — 84443 ASSAY THYROID STIM HORMONE: CPT

## 2021-08-30 PROCEDURE — 83036 HEMOGLOBIN GLYCOSYLATED A1C: CPT

## 2021-08-30 PROCEDURE — 85025 COMPLETE CBC W/AUTO DIFF WBC: CPT

## 2021-08-30 PROCEDURE — 83835 ASSAY OF METANEPHRINES: CPT

## 2021-08-30 PROCEDURE — 82024 ASSAY OF ACTH: CPT

## 2021-09-01 LAB — ACTH PLAS-MCNC: 26.1 PG/ML (ref 7.2–63.3)

## 2021-09-03 LAB
METANEPH FREE SERPL-MCNC: 35.8 PG/ML (ref 0–88)
NORMETANEPHRINE SERPL-MCNC: 92.6 PG/ML (ref 0–191.8)

## 2021-10-30 ENCOUNTER — IMMUNIZATIONS (OUTPATIENT)
Dept: FAMILY MEDICINE CLINIC | Facility: HOSPITAL | Age: 86
End: 2021-10-30

## 2021-10-30 DIAGNOSIS — Z23 ENCOUNTER FOR IMMUNIZATION: Primary | ICD-10-CM

## 2021-10-30 PROCEDURE — 91300 COVID-19 PFIZER VACC 0.3 ML: CPT

## 2021-10-30 PROCEDURE — 0001A COVID-19 PFIZER VACC 0.3 ML: CPT

## 2021-11-04 ENCOUNTER — APPOINTMENT (OUTPATIENT)
Dept: LAB | Facility: CLINIC | Age: 86
End: 2021-11-04
Payer: COMMERCIAL

## 2021-11-04 DIAGNOSIS — N13.8 ENLARGED PROSTATE WITH URINARY OBSTRUCTION: ICD-10-CM

## 2021-11-04 DIAGNOSIS — R97.20 ELEVATED PROSTATE SPECIFIC ANTIGEN (PSA): ICD-10-CM

## 2021-11-04 DIAGNOSIS — N40.1 ENLARGED PROSTATE WITH URINARY OBSTRUCTION: ICD-10-CM

## 2021-11-04 LAB
BUN SERPL-MCNC: 21 MG/DL (ref 5–25)
CREAT SERPL-MCNC: 1.37 MG/DL (ref 0.6–1.3)
GFR SERPL CREATININE-BSD FRML MDRD: 47 ML/MIN/1.73SQ M
PSA SERPL-MCNC: 6.2 NG/ML (ref 0–4)

## 2021-11-04 PROCEDURE — 84520 ASSAY OF UREA NITROGEN: CPT

## 2021-11-04 PROCEDURE — 36415 COLL VENOUS BLD VENIPUNCTURE: CPT

## 2021-11-04 PROCEDURE — 84153 ASSAY OF PSA TOTAL: CPT

## 2021-11-04 PROCEDURE — 82565 ASSAY OF CREATININE: CPT

## 2021-12-07 ENCOUNTER — OFFICE VISIT (OUTPATIENT)
Dept: INTERNAL MEDICINE CLINIC | Facility: CLINIC | Age: 86
End: 2021-12-07
Payer: COMMERCIAL

## 2021-12-07 VITALS
DIASTOLIC BLOOD PRESSURE: 74 MMHG | HEIGHT: 70 IN | TEMPERATURE: 96.4 F | WEIGHT: 171 LBS | OXYGEN SATURATION: 99 % | SYSTOLIC BLOOD PRESSURE: 142 MMHG | HEART RATE: 45 BPM | BODY MASS INDEX: 24.48 KG/M2

## 2021-12-07 DIAGNOSIS — N40.0 ENLARGED PROSTATE WITHOUT LOWER URINARY TRACT SYMPTOMS (LUTS): ICD-10-CM

## 2021-12-07 DIAGNOSIS — I10 BENIGN ESSENTIAL HYPERTENSION: Primary | ICD-10-CM

## 2021-12-07 DIAGNOSIS — E11.51 TYPE 2 DIABETES MELLITUS WITH DIABETIC PERIPHERAL ANGIOPATHY WITHOUT GANGRENE, WITH LONG-TERM CURRENT USE OF INSULIN (HCC): ICD-10-CM

## 2021-12-07 DIAGNOSIS — Z79.4 TYPE 2 DIABETES MELLITUS WITHOUT COMPLICATION, WITH LONG-TERM CURRENT USE OF INSULIN (HCC): ICD-10-CM

## 2021-12-07 DIAGNOSIS — E78.00 PURE HYPERCHOLESTEROLEMIA: ICD-10-CM

## 2021-12-07 DIAGNOSIS — Z00.00 HEALTHCARE MAINTENANCE: ICD-10-CM

## 2021-12-07 DIAGNOSIS — I25.10 ARTERIOSCLEROTIC CARDIOVASCULAR DISEASE: ICD-10-CM

## 2021-12-07 DIAGNOSIS — Z79.4 TYPE 2 DIABETES MELLITUS WITH DIABETIC PERIPHERAL ANGIOPATHY WITHOUT GANGRENE, WITH LONG-TERM CURRENT USE OF INSULIN (HCC): ICD-10-CM

## 2021-12-07 DIAGNOSIS — E11.9 TYPE 2 DIABETES MELLITUS WITHOUT COMPLICATION, WITH LONG-TERM CURRENT USE OF INSULIN (HCC): ICD-10-CM

## 2021-12-07 DIAGNOSIS — G20 PARKINSON'S DISEASE (HCC): ICD-10-CM

## 2021-12-07 PROCEDURE — 3078F DIAST BP <80 MM HG: CPT | Performed by: INTERNAL MEDICINE

## 2021-12-07 PROCEDURE — 99214 OFFICE O/P EST MOD 30 MIN: CPT | Performed by: INTERNAL MEDICINE

## 2021-12-07 PROCEDURE — 3077F SYST BP >= 140 MM HG: CPT | Performed by: INTERNAL MEDICINE

## 2021-12-27 ENCOUNTER — OFFICE VISIT (OUTPATIENT)
Dept: NEUROLOGY | Facility: CLINIC | Age: 86
End: 2021-12-27
Payer: COMMERCIAL

## 2021-12-27 VITALS
SYSTOLIC BLOOD PRESSURE: 138 MMHG | TEMPERATURE: 98.4 F | DIASTOLIC BLOOD PRESSURE: 76 MMHG | BODY MASS INDEX: 24.54 KG/M2 | WEIGHT: 171 LBS

## 2021-12-27 DIAGNOSIS — G20 PARKINSON'S DISEASE (HCC): Primary | ICD-10-CM

## 2021-12-27 PROCEDURE — 99214 OFFICE O/P EST MOD 30 MIN: CPT | Performed by: PSYCHIATRY & NEUROLOGY

## 2021-12-27 PROCEDURE — 1036F TOBACCO NON-USER: CPT | Performed by: PSYCHIATRY & NEUROLOGY

## 2021-12-27 PROCEDURE — 1160F RVW MEDS BY RX/DR IN RCRD: CPT | Performed by: PSYCHIATRY & NEUROLOGY

## 2022-01-04 ENCOUNTER — VBI (OUTPATIENT)
Dept: ADMINISTRATIVE | Facility: OTHER | Age: 87
End: 2022-01-04

## 2022-03-01 ENCOUNTER — APPOINTMENT (OUTPATIENT)
Dept: LAB | Facility: CLINIC | Age: 87
End: 2022-03-01
Payer: COMMERCIAL

## 2022-03-01 DIAGNOSIS — Z79.4 TYPE 2 DIABETES MELLITUS WITHOUT COMPLICATION, WITH LONG-TERM CURRENT USE OF INSULIN (HCC): ICD-10-CM

## 2022-03-01 DIAGNOSIS — I51.9 MYXEDEMA HEART DISEASE: ICD-10-CM

## 2022-03-01 DIAGNOSIS — E03.9 MYXEDEMA HEART DISEASE: ICD-10-CM

## 2022-03-01 DIAGNOSIS — E78.00 PURE HYPERCHOLESTEROLEMIA: ICD-10-CM

## 2022-03-01 DIAGNOSIS — E11.9 TYPE 2 DIABETES MELLITUS WITHOUT COMPLICATION, WITH LONG-TERM CURRENT USE OF INSULIN (HCC): ICD-10-CM

## 2022-03-01 DIAGNOSIS — E11.65 UNCONTROLLED TYPE 2 DIABETES MELLITUS WITH HYPERGLYCEMIA (HCC): ICD-10-CM

## 2022-03-01 DIAGNOSIS — E78.5 HYPERLIPIDEMIA, UNSPECIFIED HYPERLIPIDEMIA TYPE: ICD-10-CM

## 2022-03-01 DIAGNOSIS — Z00.00 HEALTHCARE MAINTENANCE: ICD-10-CM

## 2022-03-01 LAB
ALBUMIN SERPL BCP-MCNC: 3.8 G/DL (ref 3.5–5)
ALP SERPL-CCNC: 67 U/L (ref 46–116)
ALT SERPL W P-5'-P-CCNC: 10 U/L (ref 12–78)
ANION GAP SERPL CALCULATED.3IONS-SCNC: 4 MMOL/L (ref 4–13)
AST SERPL W P-5'-P-CCNC: 18 U/L (ref 5–45)
BASOPHILS # BLD AUTO: 0.04 THOUSANDS/ΜL (ref 0–0.1)
BASOPHILS NFR BLD AUTO: 1 % (ref 0–1)
BILIRUB SERPL-MCNC: 0.79 MG/DL (ref 0.2–1)
BUN SERPL-MCNC: 24 MG/DL (ref 5–25)
CALCIUM SERPL-MCNC: 9.5 MG/DL (ref 8.3–10.1)
CHLORIDE SERPL-SCNC: 108 MMOL/L (ref 100–108)
CHOLEST SERPL-MCNC: 134 MG/DL
CO2 SERPL-SCNC: 28 MMOL/L (ref 21–32)
CREAT SERPL-MCNC: 1.61 MG/DL (ref 0.6–1.3)
CREAT UR-MCNC: 81.5 MG/DL
EOSINOPHIL # BLD AUTO: 0.26 THOUSAND/ΜL (ref 0–0.61)
EOSINOPHIL NFR BLD AUTO: 4 % (ref 0–6)
ERYTHROCYTE [DISTWIDTH] IN BLOOD BY AUTOMATED COUNT: 13.3 % (ref 11.6–15.1)
GFR SERPL CREATININE-BSD FRML MDRD: 38 ML/MIN/1.73SQ M
GLUCOSE P FAST SERPL-MCNC: 100 MG/DL (ref 65–99)
HCT VFR BLD AUTO: 43.6 % (ref 36.5–49.3)
HDLC SERPL-MCNC: 43 MG/DL
HGB BLD-MCNC: 15 G/DL (ref 12–17)
IMM GRANULOCYTES # BLD AUTO: 0.03 THOUSAND/UL (ref 0–0.2)
IMM GRANULOCYTES NFR BLD AUTO: 0 % (ref 0–2)
LDLC SERPL CALC-MCNC: 78 MG/DL (ref 0–100)
LDLC SERPL DIRECT ASSAY-MCNC: 68 MG/DL (ref 0–100)
LYMPHOCYTES # BLD AUTO: 1.36 THOUSANDS/ΜL (ref 0.6–4.47)
LYMPHOCYTES NFR BLD AUTO: 20 % (ref 14–44)
MAGNESIUM SERPL-MCNC: 2.1 MG/DL (ref 1.6–2.6)
MCH RBC QN AUTO: 31.3 PG (ref 26.8–34.3)
MCHC RBC AUTO-ENTMCNC: 34.4 G/DL (ref 31.4–37.4)
MCV RBC AUTO: 91 FL (ref 82–98)
MICROALBUMIN UR-MCNC: 11.1 MG/L (ref 0–20)
MICROALBUMIN/CREAT 24H UR: 14 MG/G CREATININE (ref 0–30)
MONOCYTES # BLD AUTO: 0.49 THOUSAND/ΜL (ref 0.17–1.22)
MONOCYTES NFR BLD AUTO: 7 % (ref 4–12)
NEUTROPHILS # BLD AUTO: 4.53 THOUSANDS/ΜL (ref 1.85–7.62)
NEUTS SEG NFR BLD AUTO: 68 % (ref 43–75)
NONHDLC SERPL-MCNC: 91 MG/DL
NRBC BLD AUTO-RTO: 0 /100 WBCS
PHOSPHATE SERPL-MCNC: 3.6 MG/DL (ref 2.3–4.1)
PLATELET # BLD AUTO: 176 THOUSANDS/UL (ref 149–390)
PMV BLD AUTO: 10.6 FL (ref 8.9–12.7)
POTASSIUM SERPL-SCNC: 4.2 MMOL/L (ref 3.5–5.3)
PROT SERPL-MCNC: 7.5 G/DL (ref 6.4–8.2)
RBC # BLD AUTO: 4.8 MILLION/UL (ref 3.88–5.62)
SODIUM SERPL-SCNC: 140 MMOL/L (ref 136–145)
T4 FREE SERPL-MCNC: 0.86 NG/DL (ref 0.76–1.46)
TRIGL SERPL-MCNC: 65 MG/DL
TSH SERPL DL<=0.05 MIU/L-ACNC: 3.22 UIU/ML (ref 0.36–3.74)
WBC # BLD AUTO: 6.71 THOUSAND/UL (ref 4.31–10.16)

## 2022-03-01 PROCEDURE — 83721 ASSAY OF BLOOD LIPOPROTEIN: CPT

## 2022-03-01 PROCEDURE — 80061 LIPID PANEL: CPT

## 2022-03-01 PROCEDURE — 82570 ASSAY OF URINE CREATININE: CPT | Performed by: INTERNAL MEDICINE

## 2022-03-01 PROCEDURE — 83036 HEMOGLOBIN GLYCOSYLATED A1C: CPT

## 2022-03-01 PROCEDURE — 84443 ASSAY THYROID STIM HORMONE: CPT

## 2022-03-01 PROCEDURE — 36415 COLL VENOUS BLD VENIPUNCTURE: CPT

## 2022-03-01 PROCEDURE — 84100 ASSAY OF PHOSPHORUS: CPT

## 2022-03-01 PROCEDURE — 82043 UR ALBUMIN QUANTITATIVE: CPT | Performed by: INTERNAL MEDICINE

## 2022-03-01 PROCEDURE — 84439 ASSAY OF FREE THYROXINE: CPT

## 2022-03-01 PROCEDURE — 83735 ASSAY OF MAGNESIUM: CPT

## 2022-03-01 PROCEDURE — 85025 COMPLETE CBC W/AUTO DIFF WBC: CPT

## 2022-03-01 PROCEDURE — 80053 COMPREHEN METABOLIC PANEL: CPT

## 2022-03-02 LAB
EST. AVERAGE GLUCOSE BLD GHB EST-MCNC: 143 MG/DL
HBA1C MFR BLD: 6.6 %

## 2022-04-12 ENCOUNTER — OFFICE VISIT (OUTPATIENT)
Dept: INTERNAL MEDICINE CLINIC | Facility: CLINIC | Age: 87
End: 2022-04-12
Payer: COMMERCIAL

## 2022-04-12 VITALS
DIASTOLIC BLOOD PRESSURE: 62 MMHG | WEIGHT: 171 LBS | HEIGHT: 70 IN | TEMPERATURE: 97.4 F | SYSTOLIC BLOOD PRESSURE: 120 MMHG | OXYGEN SATURATION: 99 % | HEART RATE: 53 BPM | BODY MASS INDEX: 24.48 KG/M2

## 2022-04-12 DIAGNOSIS — U07.1 COVID-19: ICD-10-CM

## 2022-04-12 DIAGNOSIS — E11.51 TYPE 2 DIABETES MELLITUS WITH DIABETIC PERIPHERAL ANGIOPATHY WITHOUT GANGRENE, WITH LONG-TERM CURRENT USE OF INSULIN (HCC): ICD-10-CM

## 2022-04-12 DIAGNOSIS — R06.00 DOE (DYSPNEA ON EXERTION): ICD-10-CM

## 2022-04-12 DIAGNOSIS — I25.10 CORONARY ARTERY DISEASE INVOLVING NATIVE CORONARY ARTERY OF NATIVE HEART WITHOUT ANGINA PECTORIS: ICD-10-CM

## 2022-04-12 DIAGNOSIS — G20 PARKINSON'S DISEASE (HCC): ICD-10-CM

## 2022-04-12 DIAGNOSIS — Z79.4 TYPE 2 DIABETES MELLITUS WITH DIABETIC PERIPHERAL ANGIOPATHY WITHOUT GANGRENE, WITH LONG-TERM CURRENT USE OF INSULIN (HCC): ICD-10-CM

## 2022-04-12 DIAGNOSIS — Z00.00 HEALTHCARE MAINTENANCE: ICD-10-CM

## 2022-04-12 DIAGNOSIS — R00.1 BRADYCARDIA: Primary | ICD-10-CM

## 2022-04-12 PROCEDURE — 1036F TOBACCO NON-USER: CPT | Performed by: INTERNAL MEDICINE

## 2022-04-12 PROCEDURE — 3078F DIAST BP <80 MM HG: CPT | Performed by: INTERNAL MEDICINE

## 2022-04-12 PROCEDURE — 1125F AMNT PAIN NOTED PAIN PRSNT: CPT | Performed by: INTERNAL MEDICINE

## 2022-04-12 PROCEDURE — 99214 OFFICE O/P EST MOD 30 MIN: CPT | Performed by: INTERNAL MEDICINE

## 2022-04-12 PROCEDURE — 3725F SCREEN DEPRESSION PERFORMED: CPT | Performed by: INTERNAL MEDICINE

## 2022-04-12 PROCEDURE — 1160F RVW MEDS BY RX/DR IN RCRD: CPT | Performed by: INTERNAL MEDICINE

## 2022-04-12 PROCEDURE — G0439 PPPS, SUBSEQ VISIT: HCPCS | Performed by: INTERNAL MEDICINE

## 2022-04-12 PROCEDURE — 1170F FXNL STATUS ASSESSED: CPT | Performed by: INTERNAL MEDICINE

## 2022-04-12 PROCEDURE — 3288F FALL RISK ASSESSMENT DOCD: CPT | Performed by: INTERNAL MEDICINE

## 2022-04-12 PROCEDURE — 3074F SYST BP LT 130 MM HG: CPT | Performed by: INTERNAL MEDICINE

## 2022-04-12 NOTE — PROGRESS NOTES
Assessment and Plan:     Problem List Items Addressed This Visit     None           Preventive health issues were discussed with patient, and age appropriate screening tests were ordered as noted in patient's After Visit Summary  Personalized health advice and appropriate referrals for health education or preventive services given if needed, as noted in patient's After Visit Summary       History of Present Illness:     Patient presents for Medicare Annual Wellness visit    Patient Care Team:  Geovany Horner DO as PCP - General (Internal Medicine)  Geovany Horner DO as PCP - PCP-[facility] Attributed-Roster     Problem List:     Patient Active Problem List   Diagnosis    Arteriosclerotic cardiovascular disease    Benign essential hypertension    Diabetes mellitus ([address] )    Enlarged prostate without lower urinary tract symptoms (luts)    Hyperlipidemia    Dehydration    Coronary artery disease involving native coronary artery of native heart    Lactic acidosis    Fever    Dizziness    Other age-related cataract   [address] maintenance    Type 2 diabetes mellitus with diabetic peripheral 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 ([address] )    Peripheral vascular disease, unspecified ([address] )    Gastroenteritis    Blood in stool, mis    Tremor    Right arm weakness    Parkinson's disease ([address] )    Bradycardia    Shortness of breath    PANTOJA (dyspnea on exertion)    COVID-19      Past Medical and Surgical History:     Past Medical History:   Diagnosis Date    Diabetes mellitus ([address] )     Hypertension      Past Surgical History:   Procedure Laterality Date    CARDIAC SURGERY        Family History:     Family History   Problem Relation Age of Onset    Diabetes Mother     Heart attack Mother     Stroke Father     Hypertension Father       Social History:     Social History     Socioeconomic History    Marital status: /Civil Union     Spouse name: None    Number of children: None    Years of education: None    Highest education level: None   Occupational History    None   Tobacco Use    Smoking status: Former Smoker     Years: 45 00     Types: Cigarettes    Smokeless tobacco: Never Used   Vaping Use    Vaping Use: Never used   Substance and Sexual Activity    Alcohol use: Yes     Comment: sparingly    Drug use: No    Sexual activity: None   Other Topics Concern    None   Social History Narrative    None     Social Determinants of Health     Financial Resource Strain: Not on file   Food Insecurity: Not on file   Transportation Needs: Not on file   Physical Activity: Not on file   Stress: Not on file   Social Connections: Not on file   Intimate Partner Violence: Not on file   Housing Stability: Not on file      Medications and Allergies:     Current Outpatient Medications   Medication Sig Dispense Refill    aspirin (ASPIR-81) 81 mg EC tablet Take 1 tablet by mouth daily      b complex vitamins capsule Take 1 capsule by mouth daily      carbidopa-levodopa (SINEMET)  mg per tablet Take 1 tablet by mouth 3 (three) times a day 270 tablet 3    cholecalciferol (VITAMIN D3) 1,000 units tablet Take 1,000 Units by mouth daily      Coenzyme Q10 (COQ10) 100 MG CAPS Take by mouth daily      insulin detemir (LEVEMIR) 100 units/mL subcutaneous injection Inject 27 Units under the skin daily at bedtime 15units Qam and 18units QHS      lisinopril (ZESTRIL) 10 mg tablet Take 10 mg by mouth daily        Multiple Vitamin (MULTIVITAMIN) tablet Take 1 tablet by mouth daily      multivitamin (THERAGRAN) TABS Take 1 tablet by mouth daily      Omega-3 Fatty Acids (FISH OIL) 1,000 mg Take 1,000 mg by mouth every other day        ONE TOUCH ULTRA TEST test strip       simvastatin (ZOCOR) 40 mg tablet Take 40 mg by mouth daily at bedtime        albuterol (PROVENTIL HFA,VENTOLIN HFA) 90 mcg/act inhaler Inhale 2 puffs every 6 (six) hours as needed for wheezing or shortness of breath (Patient not taking: Reported on 8/5/2021) 1 Inhaler 5     No current facility-administered medications for this visit  Allergies   Allergen Reactions    Metformin GI Intolerance      Immunizations:     Immunization History   Administered Date(s) Administered    COVID-19 PFIZER VACCINE 0 3 ML IM 01/21/2021, 02/09/2021, 10/30/2021    Influenza, high dose seasonal 0 7 mL 10/31/2019, 09/16/2020      Health Maintenance: There are no preventive care reminders to display for this patient  Topic Date Due    Pneumococcal Vaccine: 65+ Years (1 of 2 - PPSV23) Never done    DTaP,Tdap,and Td Vaccines (1 - Tdap) Never done    Influenza Vaccine (1) 09/01/2021      Medicare Health Risk Assessment:     /62   Pulse (!) 53   Temp (!) 97 4 °F (36 3 °C)   Ht 5' 10" (1 778 m)   Wt 77 6 kg (171 lb)   SpO2 99%   BMI 24 54 kg/m²      Tai Xavier is here for his Subsequent Wellness visit  Health Risk Assessment:   Patient rates overall health as fair  Patient feels that their physical health rating is same  Patient is satisfied with their life  Eyesight was rated as slightly worse  Hearing was rated as same  Patient feels that their emotional and mental health rating is same  Patients states they are never, rarely angry  Patient states they are often unusually tired/fatigued  Pain experienced in the last 7 days has been some  Patient's pain rating has been 5/10  Patient states that he has experienced no weight loss or gain in last 6 months  Depression Screening:   PHQ-2 Score: 0      Fall Risk Screening: In the past year, patient has experienced: no history of falling in past year      Home Safety:  Patient does not have trouble with stairs inside or outside of their home  Patient has working smoke alarms and has no working carbon monoxide detector  Home safety hazards include: none  Nutrition:   Current diet is Diabetic  Medications:   Patient is currently taking over-the-counter supplements   OTC medications include: see medication list  Patient is able to manage medications  Activities of Daily Living (ADLs)/Instrumental Activities of Daily Living (IADLs):   Walk and transfer into and out of bed and chair?: Yes  Dress and groom yourself?: Yes    Bathe or shower yourself?: Yes    Feed yourself? Yes  Do your laundry/housekeeping?: Yes  Manage your money, pay your bills and track your expenses?: Yes  Make your own meals?: No    Do your own shopping?: Yes    Previous Hospitalizations:   Any hospitalizations or ED visits within the last 12 months?: No      Advance Care Planning:   Living will: Yes    Durable POA for healthcare: No    Advanced directive: Yes      PREVENTIVE SCREENINGS      Cardiovascular Screening:    General: Screening Not Indicated and History Lipid Disorder      Diabetes Screening:     General: Screening Not Indicated and History Diabetes      Colorectal Cancer Screening:     General: Screening Not Indicated      Prostate Cancer Screening:    General: Screening Not Indicated      Abdominal Aortic Aneurysm (AAA) Screening:    Risk factors include: tobacco use        Lung Cancer Screening:     General: Screening Not Indicated    Screening, Brief Intervention, and Referral to Treatment (SBIRT)    Screening  Typical number of drinks in a day: 0  Typical number of drinks in a week: 1  Interpretation: Low risk drinking behavior  AUDIT-C Screenin) How often did you have a drink containing alcohol in the past year? monthly or less  2) How many drinks did you have on a typical day when you were drinking in the past year?  1 to 2  3) How often did you have 6 or more drinks on one occasion in the past year? never    AUDIT-C Score: 1  Interpretation: Score 0-3 (male): Negative screen for alcohol misuse      Jania Hernandez,

## 2022-04-12 NOTE — ASSESSMENT & PLAN NOTE
Again some slightly increased shortness of breath with exertion post COVID    Patient states that he promises to call if this becomes a bigger problem

## 2022-04-12 NOTE — PROGRESS NOTES
Assessment/Plan:    Healthcare maintenance  Patient is an 52-year-old male history of extensive medical problems as outlined previously who is here today for repeat Medicare wellness visit  Patient did have extensive lab testing performed prior to the visit today we did discuss the results of length with the patient  Patient states in general he is doing about the same and he offers no new complaints  He denies any chest pain or pressure no increasing shortness of breath  States his appetite is stable  He feels his Parkinson's disease is under control with present medication  Continues to follow-up with specialists including his endocrinologist, cardiologist ophthalmologist   Patient did undergo physical exam today  Deferred prostate on rectal exam   Patient no new findings on examination today  Discussed with the patient his heart rate which is low and he states he is refusing to get a pacemaker placed  Refusing to further stress testing done also  Diabetic foot exam was performed today and patient continues to follow-up with his podiatrist    Type 2 diabetes mellitus with diabetic peripheral 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 [facility])  Patient is noted continues to follow-up with his endocrinologist   His blood sugar readings her stable as well as control of his diabetes  He will continue with present insulin dose and follow-up with endocrinology  Up-to-date with diet exam a diabetic foot exam was performed but patient states he continues to see his podiatrist on a regular basis  Lab Results   Component Value Date    HGBA1C 6 6 (H) 03/01/2022       Bradycardia  Cardiologist has discussed with the patient his bradycardia low heart rate  They have suggested possible need for pacemaker but he is refusing  He denies any lightheadedness or dizziness and no syncope    COVID-19  Has noted a slight difficulty with shortness of breath with exertion post COVID virus    States he is able to continue with his normal activity level around the house as previously  States that he has to stop occasionally when mowing a lawn  PANTOJA (dyspnea on exertion)  Again some slightly increased shortness of breath with exertion post COVID  Patient states that he promises to call if this becomes a bigger problem    Parkinson's disease ([facility] )  Continues to follow-up with Neurology and is taking medication as directed  Denies any increased weakness no increase tremor or rigidity  Is pleased to be able to continue activity levels as previously  Coronary artery disease involving native coronary artery of native heart  History of coronary artery disease  Denies any chest pain or pressure no increasing shortness of breath with exertion  He continues to follow-up with his cardiologist       Diagnoses and all orders for this visit:    Bradycardia    PANTOJA (dyspnea on exertion)    Healthcare maintenance    Type 2 diabetes mellitus with diabetic peripheral 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, with long-term current use of insulin ([facility] )    COVID-19    Parkinson's disease ([facility] )    Coronary artery disease involving native coronary artery of native heart without angina pectoris          Subjective:      Patient ID: Anthony Porter  is a 81 y o  male  The patient is an 31-year-old male history of medical problems as outlined previously who is here today for repeat Medicare wellness visit  He did have extensive lab testing performed prior to the visit today and we did discuss the results of length with the patient  Patient states in general he is doing well and has no difficulties with his present activity level  Denies any cardiac symptoms  Continues to follow-up with his specialists including Podiatry cardiology and endocrinology  The following portions of the patient's history were reviewed and updated as appropriate:   He  has a past medical history of Diabetes mellitus ([facility] ) and Hypertension    He   Patient Active Problem List    Diagnosis Date Noted    COVID-19 03/05/2021    PANTOJA (dyspnea on exertion) 02/26/2021    Shortness of breath 10/31/2019    Bradycardia 09/18/2019    Parkinson's disease ([facility] ) 09/05/2019    Blood in stool, mis 08/13/2019    Tremor 08/13/2019    Right arm weakness 08/13/2019    Gastroenteritis 08/06/2019    Type 2 diabetes mellitus with diabetic peripheral 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 ([place] [address] ) 03/27/2019    Peripheral vascular disease, unspecified ([place] [address] ) 03/27/2019    Healthcare maintenance 10/04/2018    Other age-related cataract 10/01/2018    Dehydration 08/23/2018    Coronary artery disease involving native coronary artery of native heart 08/23/2018    Lactic acidosis 08/23/2018    Fever 08/23/2018    Dizziness 08/23/2018    Arteriosclerotic cardiovascular disease 02/07/2013    Benign essential hypertension 02/07/2013    Diabetes mellitus ([place] [address] ) 02/07/2013    Enlarged prostate without lower urinary tract symptoms (luts) 02/07/2013    Hyperlipidemia 02/07/2013     He  has a past surgical history that includes Cardiac surgery  His family history includes Diabetes in his mother; Heart attack in his mother; Hypertension in his father; Stroke in his father  He  reports that he has quit smoking  His smoking use included cigarettes  He quit after 45 00 years of use  He has never used smokeless tobacco  He reports current alcohol use  He reports that he does not use drugs    Current Outpatient Medications   Medication Sig Dispense Refill    aspirin (ASPIR-81) 81 mg EC tablet Take 1 tablet by mouth daily      b complex vitamins capsule Take 1 capsule by mouth daily      carbidopa-levodopa (SINEMET)  mg per tablet Take 1 tablet by mouth 3 (three) times a day 270 tablet 3    cholecalciferol (VITAMIN D3) 1,000 units tablet Take 1,000 Units by mouth daily      Coenzyme Q10 (COQ10) 100 MG CAPS Take by mouth daily      insulin detemir (LEVEMIR) 100 units/mL subcutaneous injection Inject 27 Units under the skin daily at bedtime 15units Qam and 18units QHS      lisinopril (ZESTRIL) 10 mg tablet Take 10 mg by mouth daily        Multiple Vitamin (MULTIVITAMIN) tablet Take 1 tablet by mouth daily      multivitamin (THERAGRAN) TABS Take 1 tablet by mouth daily      Omega-3 Fatty Acids (FISH OIL) 1,000 mg Take 1,000 mg by mouth every other day        ONE TOUCH ULTRA TEST test strip       simvastatin (ZOCOR) 40 mg tablet Take 40 mg by mouth daily at bedtime        albuterol (PROVENTIL HFA,VENTOLIN HFA) 90 mcg/act inhaler Inhale 2 puffs every 6 (six) hours as needed for wheezing or shortness of breath (Patient not taking: Reported on 8/5/2021) 1 Inhaler 5     No current facility-administered medications for this visit       Current Outpatient Medications on File Prior to Visit   Medication Sig    aspirin (ASPIR-81) 81 mg EC tablet Take 1 tablet by mouth daily    b complex vitamins capsule Take 1 capsule by mouth daily    carbidopa-levodopa (SINEMET)  mg per tablet Take 1 tablet by mouth 3 (three) times a day    cholecalciferol (VITAMIN D3) 1,000 units tablet Take 1,000 Units by mouth daily    Coenzyme Q10 (COQ10) 100 MG CAPS Take by mouth daily    insulin detemir (LEVEMIR) 100 units/mL subcutaneous injection Inject 27 Units under the skin daily at bedtime 15units Qam and 18units QHS    lisinopril (ZESTRIL) 10 mg tablet Take 10 mg by mouth daily      Multiple Vitamin (MULTIVITAMIN) tablet Take 1 tablet by mouth daily    multivitamin (THERAGRAN) TABS Take 1 tablet by mouth daily    Omega-3 Fatty Acids (FISH OIL) 1,000 mg Take 1,000 mg by mouth every other day      ONE TOUCH ULTRA TEST test strip     simvastatin (ZOCOR) 40 mg tablet Take 40 mg by mouth daily at bedtime      albuterol (PROVENTIL HFA,VENTOLIN HFA) 90 mcg/act inhaler Inhale 2 puffs every 6 (six) hours as needed for wheezing or shortness of breath (Patient not taking: Reported on 8/5/2021)     No current facility-administered medications on file prior to visit  He is allergic to metformin       Review of Systems   Constitutional: Negative  HENT: Negative  Eyes: Negative  Respiratory: Negative  Cardiovascular: Negative  Gastrointestinal: Negative  Endocrine: Negative  Genitourinary: Negative  Musculoskeletal: Negative  Neurological: Positive for tremors (Wife is mention that he does have some tremors especially when the effects of his Sinemet wears  out)  Negative for dizziness, seizures, syncope, facial asymmetry, speech difficulty, weakness, light-headedness, numbness and headaches  Hematological: Negative  Psychiatric/Behavioral: Negative  Objective:      /62   Pulse (!) 53   Temp (!) 97 4 °F (36 3 °C)   Ht 5' 10" (1 778 m)   Wt 77 6 kg (171 lb)   SpO2 99%   BMI 24 54 kg/m²          Physical Exam  Vitals and nursing note reviewed  Constitutional:       General: He is not in acute distress  Appearance: Normal appearance  He is normal weight  He is not ill-appearing, toxic-appearing or diaphoretic  Comments: Pleasant 52-year-old male who is awake alert no acute distress and oriented x3   HENT:      Head: Normocephalic and atraumatic  Right Ear: Tympanic membrane, ear canal and external ear normal  There is no impacted cerumen  Left Ear: Tympanic membrane, ear canal and external ear normal  There is no impacted cerumen  Ears:      Comments: Significant decreased auditory acuity bilaterally     Nose: Nose normal  No congestion or rhinorrhea  Mouth/Throat:      Mouth: Mucous membranes are moist       Pharynx: Oropharynx is clear  No oropharyngeal exudate or posterior oropharyngeal erythema  Eyes:      General: No scleral icterus  Right eye: No discharge  Left eye: No discharge  Extraocular Movements: Extraocular movements intact  Conjunctiva/sclera: Conjunctivae normal       Pupils: Pupils are equal, round, and reactive to light     Neck: Vascular: No carotid bruit  Cardiovascular:      Rate and Rhythm: Regular rhythm  Bradycardia present  Heart sounds: Normal heart sounds  No murmur heard  No friction rub  No gallop  Pulmonary:      Effort: Pulmonary effort is normal  No respiratory distress  Breath sounds: Normal breath sounds  No stridor  No wheezing, rhonchi or rales  Chest:      Chest wall: No tenderness  Abdominal:      General: Abdomen is flat  Bowel sounds are normal  There is no distension  Palpations: Abdomen is soft  There is no mass  Tenderness: There is no abdominal tenderness  There is no right CVA tenderness, left CVA tenderness, guarding or rebound  Hernia: No hernia is present  Genitourinary:     Comments: Defers exam  Musculoskeletal:         General: Deformity (Some diffuse arthritic changes but nothing acute) present  No swelling, tenderness or signs of injury  Normal range of motion  Cervical back: Normal range of motion and neck supple  No rigidity or tenderness  Right lower leg: No edema  Left lower leg: No edema  Lymphadenopathy:      Cervical: No cervical adenopathy  Skin:     General: Skin is warm and dry  Capillary Refill: Capillary refill takes less than 2 seconds  Coloration: Skin is not jaundiced or pale  Findings: No bruising, erythema, lesion or rash  Neurological:      General: No focal deficit present  Mental Status: He is alert and oriented to person, place, and time  Mental status is at baseline  Cranial Nerves: No cranial nerve deficit  Sensory: No sensory deficit  Motor: No weakness  Coordination: Coordination normal       Gait: Gait normal       Deep Tendon Reflexes: Reflexes abnormal ( the absent patella and Achilles tendon reflexes bilaterally)  Psychiatric:         Mood and Affect: Mood normal          Behavior: Behavior normal          Thought Content:  Thought content normal          Judgment: Judgment normal

## 2022-04-12 NOTE — ASSESSMENT & PLAN NOTE
Has noted a slight difficulty with shortness of breath with exertion post COVID virus  States he is able to continue with his normal activity level around the house as previously  States that he has to stop occasionally when mowing a lawn

## 2022-04-12 NOTE — ASSESSMENT & PLAN NOTE
Patient is an 71-year-old male history of extensive medical problems as outlined previously who is here today for repeat Medicare wellness visit  Patient did have extensive lab testing performed prior to the visit today we did discuss the results of length with the patient  Patient states in general he is doing about the same and he offers no new complaints  He denies any chest pain or pressure no increasing shortness of breath  States his appetite is stable  He feels his Parkinson's disease is under control with present medication  Continues to follow-up with specialists including his endocrinologist, cardiologist ophthalmologist   Patient did undergo physical exam today  Deferred prostate on rectal exam   Patient no new findings on examination today  Discussed with the patient his heart rate which is low and he states he is refusing to get a pacemaker placed  Refusing to further stress testing done also    Diabetic foot exam was performed today and patient continues to follow-up with his podiatrist

## 2022-04-12 NOTE — ASSESSMENT & PLAN NOTE
Cardiologist has discussed with the patient his bradycardia low heart rate  They have suggested possible need for pacemaker but he is refusing    He denies any lightheadedness or dizziness and no syncope

## 2022-04-12 NOTE — ASSESSMENT & PLAN NOTE
History of coronary artery disease  Denies any chest pain or pressure no increasing shortness of breath with exertion    He continues to follow-up with his cardiologist

## 2022-04-12 NOTE — ASSESSMENT & PLAN NOTE
Continues to follow-up with Neurology and is taking medication as directed  Denies any increased weakness no increase tremor or rigidity  Is pleased to be able to continue activity levels as previously

## 2022-04-12 NOTE — ASSESSMENT & PLAN NOTE
Patient is noted continues to follow-up with his endocrinologist   His blood sugar readings her stable as well as control of his diabetes  He will continue with present insulin dose and follow-up with endocrinology  Up-to-date with diet exam a diabetic foot exam was performed but patient states he continues to see his podiatrist on a regular basis    Lab Results   Component Value Date    HGBA1C 6 6 (H) 03/01/2022

## 2022-04-12 NOTE — PROGRESS NOTES
Diabetic Foot Exam    Patient's shoes and socks removed  Right Foot/Ankle   Right Foot Inspection  Skin Exam: skin normal and skin intact  No dry skin, no warmth, no callus, no erythema, no maceration, no abnormal color, no pre-ulcer, no ulcer and no callus  Toe Exam: ROM and strength within normal limits and right toe deformity (Diffuse degenerative changes to the digits bilaterally)  No swelling and erythema    Sensory   Vibration: diminished  Proprioception: diminished  Monofilament testing: diminished    Vascular  Capillary refills: < 3 seconds  The right DP pulse is 1+  The right PT pulse is 1+  Left Foot/Ankle  Left Foot Inspection  Skin Exam: skin normal and skin intact  No dry skin, no warmth, no erythema, no maceration, normal color, no pre-ulcer, no ulcer and no callus  Toe Exam: left toe deformity  No swelling, no tenderness and no erythema  Sensory   Vibration: diminished  Proprioception: diminished  Monofilament testing: diminished    Vascular  Capillary refills: < 3 seconds  The left DP pulse is 1+  The left PT pulse is 1+       Assign Risk Category  Deformity present  Loss of protective sensation  Weak pulses  Risk: 2

## 2022-06-28 ENCOUNTER — OFFICE VISIT (OUTPATIENT)
Dept: NEUROLOGY | Facility: CLINIC | Age: 87
End: 2022-06-28
Payer: COMMERCIAL

## 2022-06-28 VITALS
TEMPERATURE: 97.8 F | WEIGHT: 173.2 LBS | BODY MASS INDEX: 24.85 KG/M2 | DIASTOLIC BLOOD PRESSURE: 78 MMHG | SYSTOLIC BLOOD PRESSURE: 141 MMHG

## 2022-06-28 DIAGNOSIS — G20 PARKINSON'S DISEASE (HCC): ICD-10-CM

## 2022-06-28 PROCEDURE — 99214 OFFICE O/P EST MOD 30 MIN: CPT | Performed by: PHYSICIAN ASSISTANT

## 2022-06-28 NOTE — PROGRESS NOTES
Patient ID: Ivy Kruse  is a 81 y o  male  Assessment/Plan:    Parkinson's disease [facility])    Patient with Parkinson's disease  He continues to function fairly well with his current dose of medication  His wife will occasionally notice some breakthrough tremors however these do not bother him  On exam today he did have a slight increase in right-sided slowness compared to the left and we discussed a slight increase of his medication to see if it would help with this  He is open to this at this time will have him increase his Sinemet to 1 5 tabs 3 times a day  If he has negative side effects with this increase including hallucinations, dizziness when standing, or dyskinesias than he can return to 1 tab 3 times a day  He was encouraged to remain active  He does continue to do housework/yardwork as well as goes bowling 2 to 3 times a week  Subjective:    Ivy Kruse is a RH male with CAD, HTN, DM, PVD, HLD and Parkinson's disease who presents for follow up  To review, bilateral hand tremors present since around 2018 with significant improvement per his wife with the initiation of carbidopa/levodopa  On initial presentation he had minimal parkinsonism with rare right-sided myoclonus of the arm and leg  At his last visit his tremors and bradykinesia were fairly well controlled with Sinemet  No changes were made to his medications   He was noted to have a wide based gait which was felt to be multifactorial      INTERVAL HISTORY:  He is doing well   He still goes bowling, 2-3 times a week   Wife will notice some occasional tremors in the hands, however patient does not notice it   No issues with ADLs   Sleep is not great, he does wake a few times a night to use the bathroom and it takes a little to fall back to sleep   No vivid dreams   No issues with swallowing   Wife makes all the meals however he helps   Wife fills the pill boxes and then he takes them when due   Memory seems fine still   No hallucinations   No clear on or off with the medication      Current medications and timing:  Sinemet 25/100 1 tab TID @ 9:30 3:30 7:30       I personally reviewed and updated the ROS  Objective:    Blood pressure 141/78, temperature 97 8 °F (36 6 °C), weight 78 6 kg (173 lb 3 2 oz)  Physical Exam  Constitutional:       Appearance: Normal appearance  HENT:      Right Ear: Hearing normal       Left Ear: Hearing normal    Eyes:      General: Lids are normal       Extraocular Movements: Extraocular movements intact  Pupils: Pupils are equal, round, and reactive to light  Pulmonary:      Effort: Pulmonary effort is normal    Neurological:      Mental Status: He is alert  Deep Tendon Reflexes: Strength normal    Psychiatric:         Speech: Speech normal          Neurological Exam  Mental Status  Alert  Oriented to person, place and time  Speech is normal     Cranial Nerves  CN III, IV, VI: Extraocular movements intact bilaterally  Normal lids and orbits bilaterally  Pupils equal round and reactive to light bilaterally  CN V:  Right: Facial sensation is normal   Left: Facial sensation is normal on the left  CN VIII:  Right: Hearing is normal   Left: Hearing is normal   CN XI: Shoulder shrug strength is normal   CN XII: Tongue midline without atrophy or fasciculations  Motor   Strength is 5/5 throughout all four extremities  Sensory  Light touch is normal in upper and lower extremities  Reflexes  Glabellar tap present  Coordination  Right: Finger-to-nose abnormality:Left: Finger-to-nose abnormality:    Gait    Arose without issues  Slightly decreased stride, slightly wider based gait  Stooped posture  [facility]       UPDRS motor:                              Time since last dose:  12/27/21 6/28/22   Speech  0 1   Facial Expression  0 0   Rigidity - Neck       Rigidity - Upper Extremity (Right)  0 0   Rigidity - Upper Extremity (Left)   0 0   Rigidity - Lower Extremity (Right)  0 0   Rigidity - Lower Extremity (Left)   0 0   Finger Taps (Right)   2 2   Finger Taps (Left)   2 2   Hand Movement (Right)  1 2   Hand Movement (Left)   1 1   Pronation/Supination (Right)  1 1   Pronation/Supination (Left)   1 1   Toe Tapping (Right) 1 1   Toe Tapping (Left) 0 0   Leg Agility (Right)  0 1   Leg Agility (Left)   0 1   Arising from Chair   0 0   Gait   1 1   Freezing of Gait 0 0   Postural Stability        Posture 1 1   Global spontaneity of movement 1 1   Postural Tremor (Right) 0 0   Postural Tremor (Left) 0 0   Kinetic Tremor (Right)  0 0   Kinetic Tremor (Left)  0 0   Rest tremor amplitude RUE 0 0   Rest tremor amplitude LUE 0 0   Rest tremor amplitude RLE 0 0   Reset tremor amplitude LLE 0 0   Lip/Jaw Tremor  0 0   Consistency of tremor 0 0   Motor Exam Total:           ROS:    Review of Systems   Constitutional: Negative  Negative for appetite change and fever  HENT: Negative  Negative for hearing loss, tinnitus, trouble swallowing and voice change  Eyes: Negative  Negative for photophobia and pain  Respiratory: Negative  Negative for shortness of breath  Cardiovascular: Negative  Negative for palpitations  Gastrointestinal: Negative  Negative for nausea and vomiting  Endocrine: Negative  Negative for cold intolerance  Genitourinary: Negative  Negative for dysuria, frequency and urgency  Musculoskeletal: Negative for myalgias and neck pain  Balance Issues sometimes     Skin: Negative  Negative for rash  Allergic/Immunologic: Negative  Neurological: Positive for dizziness (Once in a while)  Negative for tremors, seizures, syncope, facial asymmetry, speech difficulty, weakness, light-headedness, numbness and headaches  Hematological: Bruises/bleeds easily (Bruise)  Psychiatric/Behavioral: Positive for sleep disturbance (Sometimes)  Negative for confusion and hallucinations  All other systems reviewed and are negative

## 2022-06-28 NOTE — PATIENT INSTRUCTIONS
Patient with Parkinson's disease  He continues to function fairly well with his current dose of medication  His wife will occasionally notice some breakthrough tremors however these do not bother him  On exam today he did have a slight increase in right-sided slowness compared to the left and we discussed a slight increase of his medication to see if it would help with this  He is open to this at this time will have him increase his Sinemet to 1 5 tabs 3 times a day  If he has negative side effects with this increase including hallucinations, dizziness when standing, or dyskinesias than he can return to 1 tab 3 times a day  He was encouraged to remain active  He does continue to do housework as well as goes bowling 2 to 3 times a week

## 2022-06-28 NOTE — ASSESSMENT & PLAN NOTE
Patient with Parkinson's disease  He continues to function fairly well with his current dose of medication  His wife will occasionally notice some breakthrough tremors however these do not bother him  On exam today he did have a slight increase in right-sided slowness compared to the left and we discussed a slight increase of his medication to see if it would help with this  He is open to this at this time will have him increase his Sinemet to 1 5 tabs 3 times a day  If he has negative side effects with this increase including hallucinations, dizziness when standing, or dyskinesias than he can return to 1 tab 3 times a day  He was encouraged to remain active  He does continue to do housework/yardwork as well as goes bowling 2 to 3 times a week

## 2022-08-11 ENCOUNTER — RA CDI HCC (OUTPATIENT)
Dept: OTHER | Facility: HOSPITAL | Age: 87
End: 2022-08-11

## 2022-08-11 NOTE — PROGRESS NOTES
[facility]  coding opportunities       Chart reviewed, no opportunity found: CHART REVIEWED, NO OPPORTUNITY FOUND        Patients Insurance     Medicare Insurance: [employer] [facility] [employer]

## 2022-08-16 ENCOUNTER — OFFICE VISIT (OUTPATIENT)
Dept: INTERNAL MEDICINE CLINIC | Facility: CLINIC | Age: 87
End: 2022-08-16
Payer: COMMERCIAL

## 2022-08-16 VITALS
BODY MASS INDEX: 24.34 KG/M2 | DIASTOLIC BLOOD PRESSURE: 78 MMHG | OXYGEN SATURATION: 97 % | WEIGHT: 170 LBS | HEIGHT: 70 IN | TEMPERATURE: 98 F | SYSTOLIC BLOOD PRESSURE: 135 MMHG | HEART RATE: 50 BPM | RESPIRATION RATE: 18 BRPM

## 2022-08-16 DIAGNOSIS — I25.10 ARTERIOSCLEROTIC CARDIOVASCULAR DISEASE: ICD-10-CM

## 2022-08-16 DIAGNOSIS — E11.51 TYPE 2 DIABETES MELLITUS WITH DIABETIC PERIPHERAL ANGIOPATHY WITHOUT GANGRENE, WITH LONG-TERM CURRENT USE OF INSULIN (HCC): ICD-10-CM

## 2022-08-16 DIAGNOSIS — Z79.4 TYPE 2 DIABETES MELLITUS WITH DIABETIC PERIPHERAL ANGIOPATHY WITHOUT GANGRENE, WITH LONG-TERM CURRENT USE OF INSULIN (HCC): ICD-10-CM

## 2022-08-16 DIAGNOSIS — G20 PARKINSON'S DISEASE (HCC): ICD-10-CM

## 2022-08-16 DIAGNOSIS — I10 BENIGN ESSENTIAL HYPERTENSION: Primary | ICD-10-CM

## 2022-08-16 PROCEDURE — 3075F SYST BP GE 130 - 139MM HG: CPT | Performed by: INTERNAL MEDICINE

## 2022-08-16 PROCEDURE — 99214 OFFICE O/P EST MOD 30 MIN: CPT | Performed by: INTERNAL MEDICINE

## 2022-08-16 PROCEDURE — 1160F RVW MEDS BY RX/DR IN RCRD: CPT | Performed by: INTERNAL MEDICINE

## 2022-08-16 PROCEDURE — 3078F DIAST BP <80 MM HG: CPT | Performed by: INTERNAL MEDICINE

## 2022-08-16 NOTE — ASSESSMENT & PLAN NOTE
Patient continues to follow-up with an endocrinologist who is continuing to make adjustments with medication as far as control of his blood sugars  As noted last hemoglobin A1c is 6 6  He continues also be seen by his podiatrist and ophthalmologist   Does have an appointment to be seen by Endocrinology next month and they will be repeating testing at that time    Lab Results   Component Value Date    HGBA1C 6 6 (H) 03/01/2022

## 2022-08-16 NOTE — ASSESSMENT & PLAN NOTE
Blood pressure remains under excellent control  Patient will continue with present medication and surveillance  We will have labs performed by his endocrinologist in near future keeping an eye on his renal function    Blood pressure is controlled today and also to be checked with his cardiologist

## 2022-08-16 NOTE — ASSESSMENT & PLAN NOTE
History of atherosclerotic cardiovascular disease  Patient denies any chest pain or pressure  No increasing shortness of breath  Patient has refused to have a repeat cardiac stress test performed by his cardiologist   Has a history of stent placement in the past   We will continue to monitor all variables as far as his heart health this concern including blood pressure and cholesterol  Also importantly he is keeping a very close eye on his diabetes which has consistently shown good control

## 2022-08-16 NOTE — PROGRESS NOTES
Assessment/Plan:    Parkinson's disease [facility])  Patient continues to follow-up with Neurology regarding his Parkinson's disease  States that he has some stability with gait  Tremor is only problem rarely  With his last evaluation by Neurology they did increase his medications slightly  Is pleased with the results and will continue present medication and surveillance  Is keeping active physically     Type 2 diabetes mellitus with diabetic peripheral 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 [facility])  Patient continues to follow-up with an endocrinologist who is continuing to make adjustments with medication as far as control of his blood sugars  As noted last hemoglobin A1c is 6 6  He continues also be seen by his podiatrist and ophthalmologist   Does have an appointment to be seen by Endocrinology next month and they will be repeating testing at that time  Lab Results   Component Value Date    HGBA1C 6 6 (H) 03/01/2022       Benign essential hypertension  Blood pressure remains under excellent control  Patient will continue with present medication and surveillance  We will have labs performed by his endocrinologist in near future keeping an eye on his renal function  Blood pressure is controlled today and also to be checked with his cardiologist    Arteriosclerotic cardiovascular disease  History of atherosclerotic cardiovascular disease  Patient denies any chest pain or pressure  No increasing shortness of breath  Patient has refused to have a repeat cardiac stress test performed by his cardiologist   Has a history of stent placement in the past   We will continue to monitor all variables as far as his heart health this concern including blood pressure and cholesterol  Also importantly he is keeping a very close eye on his diabetes which has consistently shown good control         Diagnoses and all orders for this visit:    Benign essential hypertension    Arteriosclerotic cardiovascular disease    Parkinson's disease ([facility] )    Type 2 diabetes mellitus with diabetic peripheral 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, with long-term current use of insulin ([facility])          Subjective:      Patient ID: Rocky Blank  is a 81 y o  male  Patient is an 81-year-old male history of medical problems as outlined previously who is here today for follow-up accompanied by his wife  Patient relates that he is doing well and offers no new complaints or concerns  He did have a slight adjustment with his medication by his neurologist recently and is tolerating medication and states he is doing well physically from that standpoint  He continues to follow-up with his cardiologist on a regular basis and also has an upcoming appointment for re-evaluation by his endocrinologist       The following portions of the patient's history were reviewed and updated as appropriate:   He  has a past medical history of Diabetes mellitus ([facility] ) and Hypertension    He   Patient Active Problem List    Diagnosis Date Noted    COVID-19 03/05/2021    PANTOJA (dyspnea on exertion) 02/26/2021    Shortness of breath 10/31/2019    Bradycardia 09/18/2019    Parkinson's disease ([facility] ) 09/05/2019    Blood in stool, mis 08/13/2019    Tremor 08/13/2019    Right arm weakness 08/13/2019    Gastroenteritis 08/06/2019    Type 2 diabetes mellitus with diabetic peripheral 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 ([facility] ) 03/27/2019    Peripheral vascular disease, unspecified ([facility] ) 03/27/2019    Healthcare maintenance 10/04/2018    Other age-related cataract 10/01/2018    Dehydration 08/23/2018    Coronary artery disease involving native coronary artery of native heart 08/23/2018    Lactic acidosis 08/23/2018    Fever 08/23/2018    Dizziness 08/23/2018    Arteriosclerotic cardiovascular disease 02/07/2013    Benign essential hypertension 02/07/2013    Diabetes mellitus ([facility] ) 02/07/2013    Enlarged prostate without lower urinary tract symptoms (luts) 02/07/2013    Hyperlipidemia 02/07/2013     He  has a past surgical history that includes Cardiac surgery  His family history includes Diabetes in his mother; Heart attack in his mother; Hypertension in his father; Stroke in his father  He  reports that he has quit smoking  His smoking use included cigarettes  He quit after 45 00 years of use  He has never used smokeless tobacco  He reports current alcohol use  He reports that he does not use drugs  Current Outpatient Medications   Medication Sig Dispense Refill    albuterol (PROVENTIL HFA,VENTOLIN HFA) 90 mcg/act inhaler Inhale 2 puffs every 6 (six) hours as needed for wheezing or shortness of breath (Patient not taking: No sig reported) 1 Inhaler 5    aspirin (ECOTRIN LOW STRENGTH) 81 mg EC tablet Take 1 tablet by mouth daily      b complex vitamins capsule Take 1 capsule by mouth daily      carbidopa-levodopa (SINEMET)  mg per tablet Take 1 5tabs tid 450 tablet 3    cholecalciferol (VITAMIN D3) 1,000 units tablet Take 1,000 Units by mouth daily      Coenzyme Q10 (COQ10) 100 MG CAPS Take by mouth daily      insulin detemir (LEVEMIR) 100 units/mL subcutaneous injection Inject 27 Units under the skin daily at bedtime 15units Qam and 18units QHS      lisinopril (ZESTRIL) 10 mg tablet Take 10 mg by mouth daily        Multiple Vitamin (MULTIVITAMIN) tablet Take 1 tablet by mouth daily      multivitamin (THERAGRAN) TABS Take 1 tablet by mouth daily      Omega-3 Fatty Acids (FISH OIL) 1,000 mg Take 1,000 mg by mouth every other day        ONE TOUCH ULTRA TEST test strip       simvastatin (ZOCOR) 40 mg tablet Take 40 mg by mouth daily at bedtime         No current facility-administered medications for this visit       Current Outpatient Medications on File Prior to Visit   Medication Sig    albuterol (PROVENTIL HFA,VENTOLIN HFA) 90 mcg/act inhaler Inhale 2 puffs every 6 (six) hours as needed for wheezing or shortness of breath (Patient not taking: No sig reported)    aspirin (ECOTRIN LOW STRENGTH) 81 mg EC tablet Take 1 tablet by mouth daily    b complex vitamins capsule Take 1 capsule by mouth daily    carbidopa-levodopa (SINEMET)  mg per tablet Take 1 5tabs tid    cholecalciferol (VITAMIN D3) 1,000 units tablet Take 1,000 Units by mouth daily    Coenzyme Q10 (COQ10) 100 MG CAPS Take by mouth daily    insulin detemir (LEVEMIR) 100 units/mL subcutaneous injection Inject 27 Units under the skin daily at bedtime 15units Qam and 18units QHS    lisinopril (ZESTRIL) 10 mg tablet Take 10 mg by mouth daily      Multiple Vitamin (MULTIVITAMIN) tablet Take 1 tablet by mouth daily    multivitamin (THERAGRAN) TABS Take 1 tablet by mouth daily    Omega-3 Fatty Acids (FISH OIL) 1,000 mg Take 1,000 mg by mouth every other day      ONE TOUCH ULTRA TEST test strip     simvastatin (ZOCOR) 40 mg tablet Take 40 mg by mouth daily at bedtime       No current facility-administered medications on file prior to visit  He is allergic to metformin       Review of Systems   Constitutional: Negative  HENT: Negative  Eyes: Negative  Respiratory: Negative  Cardiovascular: Negative  Gastrointestinal: Negative  Endocrine: Negative  Genitourinary: Negative  Musculoskeletal: Negative  Skin: Negative  Allergic/Immunologic: Negative  Neurological: Negative  Hematological: Negative  Psychiatric/Behavioral: Negative  Objective:      /78 (BP Location: Left arm, Patient Position: Sitting, Cuff Size: Adult)   Pulse (!) 50   Temp 98 °F (36 7 °C) (Tympanic)   Resp 18   Ht 5' 10" (1 778 m)   Wt 77 1 kg (170 lb)   SpO2 97%   BMI 24 39 kg/m²          Physical Exam  Vitals and nursing note reviewed  Constitutional:       General: He is not in acute distress  Appearance: Normal appearance  He is normal weight  He is not ill-appearing, toxic-appearing or diaphoretic        Comments: Pleasant articulate 25-year-old male who is awake alert no acute distress oriented x3   HENT:      Head: Normocephalic and atraumatic  Right Ear: Tympanic membrane, ear canal and external ear normal  There is no impacted cerumen  Left Ear: Tympanic membrane, ear canal and external ear normal  There is no impacted cerumen  Nose: Nose normal  No congestion or rhinorrhea  Mouth/Throat:      Mouth: Mucous membranes are moist       Pharynx: Oropharynx is clear  No oropharyngeal exudate or posterior oropharyngeal erythema  Eyes:      General: No scleral icterus  Right eye: No discharge  Left eye: No discharge  Extraocular Movements: Extraocular movements intact  Conjunctiva/sclera: Conjunctivae normal       Pupils: Pupils are equal, round, and reactive to light  Neck:      Vascular: No carotid bruit  Cardiovascular:      Rate and Rhythm: Regular rhythm  Bradycardia present  Pulses: Normal pulses  Heart sounds: Normal heart sounds  No murmur heard  No friction rub  No gallop  Pulmonary:      Effort: Pulmonary effort is normal  No respiratory distress  Breath sounds: Normal breath sounds  No stridor  No wheezing, rhonchi or rales  Chest:      Chest wall: No tenderness  Abdominal:      General: Abdomen is flat  Bowel sounds are normal  There is no distension  Palpations: Abdomen is soft  There is no mass  Tenderness: There is no abdominal tenderness  There is no right CVA tenderness, left CVA tenderness, guarding or rebound  Hernia: No hernia is present  Musculoskeletal:         General: No swelling, tenderness, deformity or signs of injury  Normal range of motion  Cervical back: Normal range of motion and neck supple  No rigidity or tenderness  Right lower leg: No edema  Left lower leg: No edema  Lymphadenopathy:      Cervical: No cervical adenopathy  Skin:     General: Skin is warm and dry  Coloration: Skin is not jaundiced or pale        Findings: No bruising, erythema, lesion or rash  Neurological:      General: No focal deficit present  Mental Status: He is alert and oriented to person, place, and time  Mental status is at baseline  Cranial Nerves: No cranial nerve deficit  Sensory: No sensory deficit  Motor: No weakness  Coordination: Coordination normal       Gait: Gait normal       Deep Tendon Reflexes: Reflexes abnormal       Comments: No tremor on examination  No rigidity to musculature  Psychiatric:         Mood and Affect: Mood normal          Behavior: Behavior normal          Thought Content:  Thought content normal          Judgment: Judgment normal

## 2022-08-16 NOTE — ASSESSMENT & PLAN NOTE
Patient continues to follow-up with Neurology regarding his Parkinson's disease  States that he has some stability with gait  Tremor is only problem rarely  With his last evaluation by Neurology they did increase his medications slightly  Is pleased with the results and will continue present medication and surveillance    Is keeping active physically Initiate Treatment: Doryx 200 mg x 1 wk Detail Level: Simple

## 2022-08-29 ENCOUNTER — APPOINTMENT (OUTPATIENT)
Dept: LAB | Facility: CLINIC | Age: 87
End: 2022-08-29
Payer: COMMERCIAL

## 2022-08-29 DIAGNOSIS — E34.9 MULTIPLE ENDOCRINE DEFICIENCY SYNDROME: ICD-10-CM

## 2022-08-29 DIAGNOSIS — I10 ESSENTIAL HYPERTENSION, MALIGNANT: ICD-10-CM

## 2022-08-29 DIAGNOSIS — E11.65 INADEQUATELY CONTROLLED DIABETES MELLITUS (HCC): ICD-10-CM

## 2022-08-29 LAB
ALBUMIN SERPL BCP-MCNC: 3.7 G/DL (ref 3.5–5)
ALP SERPL-CCNC: 62 U/L (ref 46–116)
ALT SERPL W P-5'-P-CCNC: 16 U/L (ref 12–78)
ANION GAP SERPL CALCULATED.3IONS-SCNC: 5 MMOL/L (ref 4–13)
AST SERPL W P-5'-P-CCNC: 18 U/L (ref 5–45)
BASOPHILS # BLD AUTO: 0.05 THOUSANDS/ΜL (ref 0–0.1)
BASOPHILS NFR BLD AUTO: 1 % (ref 0–1)
BILIRUB SERPL-MCNC: 0.61 MG/DL (ref 0.2–1)
BUN SERPL-MCNC: 26 MG/DL (ref 5–25)
CALCIUM SERPL-MCNC: 9.4 MG/DL (ref 8.3–10.1)
CHLORIDE SERPL-SCNC: 109 MMOL/L (ref 96–108)
CO2 SERPL-SCNC: 27 MMOL/L (ref 21–32)
CREAT SERPL-MCNC: 1.46 MG/DL (ref 0.6–1.3)
EOSINOPHIL # BLD AUTO: 0.32 THOUSAND/ΜL (ref 0–0.61)
EOSINOPHIL NFR BLD AUTO: 4 % (ref 0–6)
ERYTHROCYTE [DISTWIDTH] IN BLOOD BY AUTOMATED COUNT: 13.5 % (ref 11.6–15.1)
EST. AVERAGE GLUCOSE BLD GHB EST-MCNC: 157 MG/DL
GFR SERPL CREATININE-BSD FRML MDRD: 42 ML/MIN/1.73SQ M
GLUCOSE P FAST SERPL-MCNC: 97 MG/DL (ref 65–99)
HBA1C MFR BLD: 7.1 %
HCT VFR BLD AUTO: 43.1 % (ref 36.5–49.3)
HGB BLD-MCNC: 14.6 G/DL (ref 12–17)
IMM GRANULOCYTES # BLD AUTO: 0.02 THOUSAND/UL (ref 0–0.2)
IMM GRANULOCYTES NFR BLD AUTO: 0 % (ref 0–2)
LYMPHOCYTES # BLD AUTO: 2.33 THOUSANDS/ΜL (ref 0.6–4.47)
LYMPHOCYTES NFR BLD AUTO: 28 % (ref 14–44)
MAGNESIUM SERPL-MCNC: 2.1 MG/DL (ref 1.6–2.6)
MCH RBC QN AUTO: 31.5 PG (ref 26.8–34.3)
MCHC RBC AUTO-ENTMCNC: 33.9 G/DL (ref 31.4–37.4)
MCV RBC AUTO: 93 FL (ref 82–98)
MONOCYTES # BLD AUTO: 0.55 THOUSAND/ΜL (ref 0.17–1.22)
MONOCYTES NFR BLD AUTO: 7 % (ref 4–12)
NEUTROPHILS # BLD AUTO: 4.98 THOUSANDS/ΜL (ref 1.85–7.62)
NEUTS SEG NFR BLD AUTO: 60 % (ref 43–75)
NRBC BLD AUTO-RTO: 0 /100 WBCS
PHOSPHATE SERPL-MCNC: 3.9 MG/DL (ref 2.3–4.1)
PLATELET # BLD AUTO: 191 THOUSANDS/UL (ref 149–390)
PMV BLD AUTO: 11.1 FL (ref 8.9–12.7)
POTASSIUM SERPL-SCNC: 4.2 MMOL/L (ref 3.5–5.3)
PROT SERPL-MCNC: 7.3 G/DL (ref 6.4–8.4)
RBC # BLD AUTO: 4.63 MILLION/UL (ref 3.88–5.62)
SODIUM SERPL-SCNC: 141 MMOL/L (ref 135–147)
T4 FREE SERPL-MCNC: 0.83 NG/DL (ref 0.76–1.46)
TSH SERPL DL<=0.05 MIU/L-ACNC: 4.66 UIU/ML (ref 0.45–4.5)
WBC # BLD AUTO: 8.25 THOUSAND/UL (ref 4.31–10.16)

## 2022-08-29 PROCEDURE — 84443 ASSAY THYROID STIM HORMONE: CPT

## 2022-08-29 PROCEDURE — 83036 HEMOGLOBIN GLYCOSYLATED A1C: CPT

## 2022-08-29 PROCEDURE — 83735 ASSAY OF MAGNESIUM: CPT

## 2022-08-29 PROCEDURE — 36415 COLL VENOUS BLD VENIPUNCTURE: CPT

## 2022-08-29 PROCEDURE — 84100 ASSAY OF PHOSPHORUS: CPT

## 2022-08-29 PROCEDURE — 80053 COMPREHEN METABOLIC PANEL: CPT

## 2022-08-29 PROCEDURE — 84439 ASSAY OF FREE THYROXINE: CPT

## 2022-08-29 PROCEDURE — 85025 COMPLETE CBC W/AUTO DIFF WBC: CPT

## 2022-11-10 ENCOUNTER — APPOINTMENT (OUTPATIENT)
Dept: LAB | Facility: CLINIC | Age: 87
End: 2022-11-10

## 2022-11-10 ENCOUNTER — TRANSCRIBE ORDERS (OUTPATIENT)
Dept: LAB | Facility: CLINIC | Age: 87
End: 2022-11-10

## 2022-11-10 DIAGNOSIS — N13.8 ENLARGED PROSTATE WITH URINARY OBSTRUCTION: ICD-10-CM

## 2022-11-10 DIAGNOSIS — N40.1 ENLARGED PROSTATE WITH URINARY OBSTRUCTION: ICD-10-CM

## 2022-11-10 DIAGNOSIS — Z79.899 ENCOUNTER FOR LONG-TERM (CURRENT) USE OF OTHER MEDICATIONS: ICD-10-CM

## 2022-11-10 DIAGNOSIS — R97.20 ELEVATED PROSTATE SPECIFIC ANTIGEN (PSA): ICD-10-CM

## 2022-11-10 DIAGNOSIS — N28.9 URETERAL SLUDGE: Primary | ICD-10-CM

## 2022-11-10 DIAGNOSIS — N28.9 URETERAL SLUDGE: ICD-10-CM

## 2022-11-10 LAB
ANION GAP SERPL CALCULATED.3IONS-SCNC: 4 MMOL/L (ref 4–13)
BUN SERPL-MCNC: 31 MG/DL (ref 5–25)
CALCIUM SERPL-MCNC: 9.6 MG/DL (ref 8.3–10.1)
CHLORIDE SERPL-SCNC: 104 MMOL/L (ref 96–108)
CK SERPL-CCNC: 54 U/L (ref 39–308)
CO2 SERPL-SCNC: 30 MMOL/L (ref 21–32)
CREAT SERPL-MCNC: 1.42 MG/DL (ref 0.6–1.3)
GFR SERPL CREATININE-BSD FRML MDRD: 44 ML/MIN/1.73SQ M
GLUCOSE SERPL-MCNC: 218 MG/DL (ref 65–140)
POTASSIUM SERPL-SCNC: 4.4 MMOL/L (ref 3.5–5.3)
PSA SERPL-MCNC: 8.2 NG/ML (ref 0–4)
SODIUM SERPL-SCNC: 138 MMOL/L (ref 135–147)

## 2022-12-21 ENCOUNTER — OFFICE VISIT (OUTPATIENT)
Dept: INTERNAL MEDICINE CLINIC | Facility: CLINIC | Age: 87
End: 2022-12-21

## 2022-12-21 VITALS
BODY MASS INDEX: 24.45 KG/M2 | SYSTOLIC BLOOD PRESSURE: 116 MMHG | TEMPERATURE: 96.2 F | HEIGHT: 70 IN | OXYGEN SATURATION: 98 % | WEIGHT: 170.8 LBS | DIASTOLIC BLOOD PRESSURE: 78 MMHG | HEART RATE: 53 BPM

## 2022-12-21 DIAGNOSIS — I25.10 ARTERIOSCLEROTIC CARDIOVASCULAR DISEASE: ICD-10-CM

## 2022-12-21 DIAGNOSIS — Z79.4 TYPE 2 DIABETES MELLITUS WITH DIABETIC PERIPHERAL ANGIOPATHY WITHOUT GANGRENE, WITH LONG-TERM CURRENT USE OF INSULIN (HCC): ICD-10-CM

## 2022-12-21 DIAGNOSIS — G20 PARKINSON'S DISEASE (HCC): ICD-10-CM

## 2022-12-21 DIAGNOSIS — E11.51 TYPE 2 DIABETES MELLITUS WITH DIABETIC PERIPHERAL ANGIOPATHY WITHOUT GANGRENE, WITH LONG-TERM CURRENT USE OF INSULIN (HCC): ICD-10-CM

## 2022-12-21 DIAGNOSIS — I10 BENIGN ESSENTIAL HYPERTENSION: Primary | ICD-10-CM

## 2022-12-21 RX ORDER — LISINOPRIL 5 MG/1
5 TABLET ORAL DAILY
COMMUNITY
Start: 2022-11-08

## 2022-12-21 NOTE — ASSESSMENT & PLAN NOTE
Again had recent visit and was seen by his cardiologist   He denies any chest pain or pressure no increasing shortness of breath with exertion    No evidence of recurrence of disease and he is considered stable but he knows to call if any changes

## 2022-12-21 NOTE — PROGRESS NOTES
Name: Abimael Torres  : 1935      MRN: [id number]  Encounter Provider: Staci Muller DO  Encounter Date: 2022   Encounter department: [facility] INTERNAL MEDICINE    Assessment & Plan     1  Benign essential hypertension  Assessment & Plan:  Patient's blood pressure is showing excellent control  Denies any lightheadedness or dizziness  He has had some recent adjustment with his medication by his cardiologist   We will continue present medication, surveillance of kidney function      2  Arteriosclerotic cardiovascular disease  Assessment & Plan:  Again had recent visit and was seen by his cardiologist   He denies any chest pain or pressure no increasing shortness of breath with exertion  No evidence of recurrence of disease and he is considered stable but he knows to call if any changes      3  Type 2 diabetes mellitus with diabetic peripheral 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, with long-term current use of insulin (HCC)  Assessment & Plan:  As noted patient's hemoglobin A1c is beginning to rise from his baseline  He does have an appointment in the near future to discuss this with his endocrinologist   Patient admits that he is not always perfect with diet  Up-to-date with eye care, podiatric care  Lab Results   Component Value Date    HGBA1C 7 1 (H) 2022         4  Parkinson's disease [facility])  Assessment & Plan:  Patient continues to follow-up with neurology  States that he is showing exceptional functional capacity with treatment for his Parkinson's disease  Patient is back to bowling 3 times a week  He does admit to occasional tremor when the medicine seems to be wearing off  On examination patient has no tremor no rigidity cogwheeling  We will continue to follow-up with neurology             Subjective     Patient is an 80-year-old male history of multiple medical problems as outlined previously who is here today accompanied by his wife for a follow-up    Patient states in general doing relatively well offers no new complaints  He continues to follow-up with his urologist, cardiologist, endocrinologist, neurologist   Patient continues to stay physically active and is bowling he states at least 3 times a week    Review of Systems   Constitutional: Negative  HENT: Negative  Eyes: Negative  Respiratory: Negative  Cardiovascular: Negative  Gastrointestinal: Negative  Endocrine: Negative  Genitourinary: Negative  Musculoskeletal: Negative  Skin: Negative  Allergic/Immunologic: Negative  Neurological: Negative  Hematological: Negative          Past Medical History:   Diagnosis Date   • Diabetes mellitus ([facility] )    • Hypertension      Past Surgical History:   Procedure Laterality Date   • CARDIAC SURGERY       Family History   Problem Relation Age of Onset   • Diabetes Mother    • Heart attack Mother    • Stroke Father    • Hypertension Father      Social History     Socioeconomic History   • Marital status: /Civil Union     Spouse name: None   • Number of children: None   • Years of education: None   • Highest education level: None   Occupational History   • None   Tobacco Use   • Smoking status: Former     Years: 45 00     Types: Cigarettes   • Smokeless tobacco: Never   Vaping Use   • Vaping Use: Never used   Substance and Sexual Activity   • Alcohol use: Yes     Comment: sparingly   • Drug use: No   • Sexual activity: None   Other Topics Concern   • None   Social History Narrative   • None     Social Determinants of Health     Financial Resource Strain: Not on file   Food Insecurity: Not on file   Transportation Needs: Not on file   Physical Activity: Not on file   Stress: Not on file   Social Connections: Not on file   Intimate Partner Violence: Not on file   Housing Stability: Not on file     Current Outpatient Medications on File Prior to Visit   Medication Sig   • aspirin (ECOTRIN LOW STRENGTH) 81 mg EC tablet Take 1 tablet by mouth daily   • b complex vitamins capsule Take 1 capsule by mouth daily   • carbidopa-levodopa (SINEMET)  mg per tablet Take 1 5tabs tid   • cholecalciferol (VITAMIN D3) 1,000 units tablet Take 1,000 Units by mouth daily   • Coenzyme Q10 (COQ10) 100 MG CAPS Take by mouth daily   • insulin detemir (LEVEMIR) 100 units/mL subcutaneous injection Inject 27 Units under the skin daily at bedtime 15units Qam and 18units QHS   • lisinopril (ZESTRIL) 10 mg tablet Take 10 mg by mouth daily     • lisinopril (ZESTRIL) 5 mg tablet Take 5 mg by mouth daily   • Multiple Vitamin (MULTIVITAMIN) tablet Take 1 tablet by mouth daily   • multivitamin (THERAGRAN) TABS Take 1 tablet by mouth daily   • Omega-3 Fatty Acids (FISH OIL) 1,000 mg Take 1,000 mg by mouth every other day     • ONE TOUCH ULTRA TEST test strip    • simvastatin (ZOCOR) 40 mg tablet Take 40 mg by mouth daily at bedtime     • albuterol (PROVENTIL HFA,VENTOLIN HFA) 90 mcg/act inhaler Inhale 2 puffs every 6 (six) hours as needed for wheezing or shortness of breath (Patient not taking: Reported on 12/21/2022)     Allergies   Allergen Reactions   • Metformin GI Intolerance     Immunization History   Administered Date(s) Administered   • COVID-19 PFIZER VACCINE 0 3 ML IM 01/21/2021, 02/09/2021, 10/30/2021   • Influenza, high dose seasonal 0 7 mL 10/31/2019, 09/16/2020, 11/01/2022       Objective     /78   Pulse (!) 53   Temp (!) 96 2 °F (35 7 °C)   Ht 5' 10" (1 778 m)   Wt 77 5 kg (170 lb 12 8 oz)   SpO2 98%   BMI 24 51 kg/m²     Physical Exam  Vitals and nursing note reviewed  Constitutional:       General: He is not in acute distress  Appearance: Normal appearance  He is normal weight  He is not ill-appearing, toxic-appearing or diaphoretic  Comments: Pleasant, articulate 52-year-old male who is awake alert in no acute distress and oriented x3   HENT:      Head: Normocephalic and atraumatic        Right Ear: Tympanic membrane, ear canal and external ear normal  There is no impacted cerumen  Left Ear: Tympanic membrane, ear canal and external ear normal  There is no impacted cerumen  Nose: Nose normal  No congestion or rhinorrhea  Mouth/Throat:      Mouth: Mucous membranes are moist       Pharynx: Oropharynx is clear  No oropharyngeal exudate or posterior oropharyngeal erythema  Eyes:      General: No scleral icterus  Right eye: No discharge  Left eye: No discharge  Extraocular Movements: Extraocular movements intact  Conjunctiva/sclera: Conjunctivae normal       Pupils: Pupils are equal, round, and reactive to light  Neck:      Vascular: No carotid bruit  Cardiovascular:      Rate and Rhythm: Regular rhythm  Bradycardia present  Pulses: Normal pulses  Heart sounds: Normal heart sounds  No friction rub  No gallop  Pulmonary:      Effort: Pulmonary effort is normal  No respiratory distress  Breath sounds: Normal breath sounds  No stridor  No wheezing, rhonchi or rales  Chest:      Chest wall: No tenderness  Abdominal:      General: Abdomen is flat  Bowel sounds are normal  There is no distension  Palpations: Abdomen is soft  There is no mass  Tenderness: There is no abdominal tenderness  There is no right CVA tenderness, left CVA tenderness, guarding or rebound  Hernia: No hernia is present  Musculoskeletal:         General: Tenderness and deformity present  No swelling or signs of injury  Cervical back: Normal range of motion and neck supple  No rigidity or tenderness  Right lower leg: No edema  Left lower leg: No edema  Comments: Some diffuse arthritic changes but nothing acute   Lymphadenopathy:      Cervical: No cervical adenopathy  Skin:     General: Skin is warm and dry  Coloration: Skin is not jaundiced or pale  Findings: No bruising, erythema, lesion or rash        Comments: Evaluation skin surface to his back numerous areas of actinic keratosis  No lesions that look premalignant or malignant  Did suggest seeing dermatology   Neurological:      Mental Status: He is alert and oriented to person, place, and time  Mental status is at baseline  Cranial Nerves: No cranial nerve deficit  Sensory: No sensory deficit  Motor: No weakness  Coordination: Coordination normal       Gait: Gait normal       Deep Tendon Reflexes: Reflexes normal       Comments: At this time and with evaluation today patient has no acute neurologic changes  Wife states that occasionally she notices some tremor  He continues to follow-up with neurology   Psychiatric:         Mood and Affect: Mood normal          Behavior: Behavior normal          Thought Content:  Thought content normal          Judgment: Judgment normal        Elian Don DO

## 2022-12-21 NOTE — ASSESSMENT & PLAN NOTE
Patient's blood pressure is showing excellent control  Denies any lightheadedness or dizziness    He has had some recent adjustment with his medication by his cardiologist   We will continue present medication, surveillance of kidney function

## 2022-12-21 NOTE — ASSESSMENT & PLAN NOTE
Patient continues to follow-up with neurology  States that he is showing exceptional functional capacity with treatment for his Parkinson's disease  Patient is back to bowling 3 times a week  He does admit to occasional tremor when the medicine seems to be wearing off  On examination patient has no tremor no rigidity cogwheeling    We will continue to follow-up with neurology

## 2022-12-28 ENCOUNTER — OFFICE VISIT (OUTPATIENT)
Dept: NEUROLOGY | Facility: CLINIC | Age: 87
End: 2022-12-28

## 2022-12-28 VITALS
WEIGHT: 169.6 LBS | BODY MASS INDEX: 24.34 KG/M2 | HEART RATE: 54 BPM | DIASTOLIC BLOOD PRESSURE: 65 MMHG | SYSTOLIC BLOOD PRESSURE: 144 MMHG | TEMPERATURE: 97.9 F

## 2022-12-28 DIAGNOSIS — G20 PARKINSON'S DISEASE (HCC): Primary | ICD-10-CM

## 2022-12-28 NOTE — PROGRESS NOTES
Patient ID: Klarissa Welch  is a 81 y o  male  Assessment/Plan:    Parkinson's disease [facility]  Patient with Parkinson's disease  He is doing very well with his current dose of Sinemet  At this point his tremors are well controlled  He does not have any clear on or off with the medication  His exam is improved compared to last visit  He is still functioning well and able to perform all ADLs independently  We will not make any changes to his Sinemet dose at this time  He was encouraged to remain active  He does continue to go bowling 3 days a week  Subjective:    Klarissa Welch is a RH male with CAD, HTN, DM, PVD, HLD and Parkinson's disease who presents for follow up  To review, bilateral hand tremors present since around 2018 with significant improvement per his wife with the initiation of carbidopa/levodopa  On initial presentation he had minimal parkinsonism with rare right-sided myoclonus of the arm and leg  At his last visit he continued to have some intermittent tremors and increased right sided bradykinesia on exam   His Sinemet dose was increased  INTERVAL HISTORY:  Tremors are currently very well controlled   He also states that the wife no longer notices the tremors   He can perform all of his ADLs on his own   No issues with swallowing   He has a good appetite   Some nights he sleeps better than others, he tends to have to wake a few times to use the bathroom  He feels that his memory is still very good   No hallucinations   No clear on or off with the medication   No falls   He goes bowling 3 days a week     Current medications and timing:  Sinemet 25/100 1 5 tabs TID @ 9:30 3:30 9:30      I personally reviewed and updated the ROS  Objective:    Blood pressure 144/65, pulse (!) 54, temperature 97 9 °F (36 6 °C), weight 76 9 kg (169 lb 9 6 oz)  Physical Exam  Constitutional:       Appearance: Normal appearance     HENT:      Right Ear: Hearing normal       Left Ear: Hearing normal    Eyes:      General: Lids are normal       Extraocular Movements: Extraocular movements intact  Pupils: Pupils are equal, round, and reactive to light  Pulmonary:      Effort: Pulmonary effort is normal    Neurological:      Mental Status: He is alert  Motor: Motor strength is normal    Psychiatric:         Speech: Speech normal          Neurological Exam  Mental Status  Alert  Oriented to person, place and time  Speech is normal     Cranial Nerves  CN III, IV, VI: Extraocular movements intact bilaterally  Normal lids and orbits bilaterally  Pupils equal round and reactive to light bilaterally  CN V:  Right: Facial sensation is normal   Left: Facial sensation is normal on the left  CN VIII:  Right: Hearing is normal   Left: Hearing is normal   CN XI: Shoulder shrug strength is normal   CN XII: Tongue midline without atrophy or fasciculations  Motor   Strength is 5/5 throughout all four extremities  Sensory  Light touch is normal in upper and lower extremities  Reflexes  Glabellar tap present  Coordination  Right: Finger-to-nose abnormality:Left: Finger-to-nose abnormality:    Gait    Arose without issues  Slightly decreased stride, slightly wider based gait  Stooped posture  Naoma Mood         UPDRS motor:                              Time since last dose:  12/28/22 6/28/22   Speech  1 1   Facial Expression  0 0   Rigidity - Neck        Rigidity - Upper Extremity (Right)  0 0   Rigidity - Upper Extremity (Left)   0 0   Rigidity - Lower Extremity (Right)  0 0   Rigidity - Lower Extremity (Left)   0 0   Finger Taps (Right)   1 2   Finger Taps (Left)   1 2   Hand Movement (Right)  1 2   Hand Movement (Left)   1 1   Pronation/Supination (Right)  1 1   Pronation/Supination (Left)   1 1   Toe Tapping (Right) 1 1   Toe Tapping (Left) 0 0   Leg Agility (Right)  0 1   Leg Agility (Left)   0 1   Arising from Chair   0 0   Gait   1 1   Freezing of Gait 0 0   Postural Stability       Posture 1 1   Global spontaneity of movement 1 1   Postural Tremor (Right) 0 0   Postural Tremor (Left) 0 0   Kinetic Tremor (Right)  0 0   Kinetic Tremor (Left)  0 0   Rest tremor amplitude RUE 0 0   Rest tremor amplitude LUE 0 0   Rest tremor amplitude RLE 0 0   Reset tremor amplitude LLE 0 0   Lip/Jaw Tremor  0 0   Consistency of tremor 0 0   Motor Exam Total:              ROS:    Review of Systems   Constitutional: Negative  Negative for appetite change and fever  HENT: Negative  Negative for hearing loss, tinnitus, trouble swallowing and voice change  Eyes: Negative  Negative for photophobia, pain and visual disturbance  Respiratory: Negative  Negative for shortness of breath  Cardiovascular: Negative  Negative for palpitations  Gastrointestinal: Negative  Negative for nausea and vomiting  Endocrine: Negative  Negative for cold intolerance  Genitourinary: Negative  Negative for dysuria, frequency and urgency  Musculoskeletal: Positive for myalgias (Shoulders)  Negative for gait problem and neck pain  Skin: Negative  Negative for rash  Allergic/Immunologic: Negative  Neurological: Negative  Negative for dizziness, tremors, seizures, syncope, facial asymmetry, speech difficulty, weakness, light-headedness, numbness and headaches  Hematological: Negative  Does not bruise/bleed easily  Psychiatric/Behavioral: Negative  Negative for confusion, hallucinations and sleep disturbance  All other systems reviewed and are negative

## 2022-12-28 NOTE — PATIENT INSTRUCTIONS
Patient with Parkinson's disease  He is doing very well with his current dose of Sinemet  At this point his tremors are well controlled  He does not have any clear on or off with the medication  His exam is improved compared to last visit  He is still functioning well and able to perform all ADLs independently  We will not make any changes to his Sinemet dose at this time  He was encouraged to remain active  He does continue to go bowling 3 days a week

## 2023-02-13 ENCOUNTER — APPOINTMENT (OUTPATIENT)
Dept: LAB | Facility: CLINIC | Age: 88
End: 2023-02-13

## 2023-02-13 DIAGNOSIS — E11.00 TYPE II DIABETES MELLITUS WITH HYPEROSMOLARITY, UNCONTROLLED (HCC): ICD-10-CM

## 2023-02-13 DIAGNOSIS — I10 ESSENTIAL HYPERTENSION, MALIGNANT: ICD-10-CM

## 2023-02-13 DIAGNOSIS — E03.9 MYXEDEMA HEART DISEASE: ICD-10-CM

## 2023-02-13 DIAGNOSIS — N40.0 ENLARGED PROSTATE WITHOUT LOWER URINARY TRACT SYMPTOMS (LUTS): ICD-10-CM

## 2023-02-13 DIAGNOSIS — N28.9 URETERAL SLUDGE: ICD-10-CM

## 2023-02-13 DIAGNOSIS — E11.22 TYPE 2 DIABETES MELLITUS WITH DIABETIC CHRONIC KIDNEY DISEASE, UNSPECIFIED CKD STAGE, UNSPECIFIED WHETHER LONG TERM INSULIN USE (HCC): ICD-10-CM

## 2023-02-13 DIAGNOSIS — E11.65 TYPE II DIABETES MELLITUS WITH HYPEROSMOLARITY, UNCONTROLLED (HCC): ICD-10-CM

## 2023-02-13 DIAGNOSIS — I51.9 MYXEDEMA HEART DISEASE: ICD-10-CM

## 2023-02-13 DIAGNOSIS — E34.9 ENDOCRINE DISORDER RELATED TO PUBERTY: ICD-10-CM

## 2023-02-13 LAB
ALBUMIN SERPL BCP-MCNC: 3.8 G/DL (ref 3.5–5)
ALP SERPL-CCNC: 61 U/L (ref 46–116)
ALT SERPL W P-5'-P-CCNC: 16 U/L (ref 12–78)
ANION GAP SERPL CALCULATED.3IONS-SCNC: 5 MMOL/L (ref 4–13)
AST SERPL W P-5'-P-CCNC: 22 U/L (ref 5–45)
BASOPHILS # BLD AUTO: 0.04 THOUSANDS/ÂΜL (ref 0–0.1)
BASOPHILS NFR BLD AUTO: 1 % (ref 0–1)
BILIRUB SERPL-MCNC: 0.79 MG/DL (ref 0.2–1)
BUN SERPL-MCNC: 26 MG/DL (ref 5–25)
CALCIUM SERPL-MCNC: 10.1 MG/DL (ref 8.3–10.1)
CHLORIDE SERPL-SCNC: 105 MMOL/L (ref 96–108)
CO2 SERPL-SCNC: 30 MMOL/L (ref 21–32)
CREAT SERPL-MCNC: 1.54 MG/DL (ref 0.6–1.3)
EOSINOPHIL # BLD AUTO: 0.25 THOUSAND/ÂΜL (ref 0–0.61)
EOSINOPHIL NFR BLD AUTO: 4 % (ref 0–6)
ERYTHROCYTE [DISTWIDTH] IN BLOOD BY AUTOMATED COUNT: 13.2 % (ref 11.6–15.1)
GFR SERPL CREATININE-BSD FRML MDRD: 39 ML/MIN/1.73SQ M
GLUCOSE P FAST SERPL-MCNC: 132 MG/DL (ref 65–99)
HCT VFR BLD AUTO: 46.2 % (ref 36.5–49.3)
HGB BLD-MCNC: 15.5 G/DL (ref 12–17)
IMM GRANULOCYTES # BLD AUTO: 0.03 THOUSAND/UL (ref 0–0.2)
IMM GRANULOCYTES NFR BLD AUTO: 0 % (ref 0–2)
LYMPHOCYTES # BLD AUTO: 1.65 THOUSANDS/ÂΜL (ref 0.6–4.47)
LYMPHOCYTES NFR BLD AUTO: 24 % (ref 14–44)
MAGNESIUM SERPL-MCNC: 2.2 MG/DL (ref 1.6–2.6)
MCH RBC QN AUTO: 30.8 PG (ref 26.8–34.3)
MCHC RBC AUTO-ENTMCNC: 33.5 G/DL (ref 31.4–37.4)
MCV RBC AUTO: 92 FL (ref 82–98)
MONOCYTES # BLD AUTO: 0.46 THOUSAND/ÂΜL (ref 0.17–1.22)
MONOCYTES NFR BLD AUTO: 7 % (ref 4–12)
NEUTROPHILS # BLD AUTO: 4.33 THOUSANDS/ÂΜL (ref 1.85–7.62)
NEUTS SEG NFR BLD AUTO: 64 % (ref 43–75)
NRBC BLD AUTO-RTO: 0 /100 WBCS
PHOSPHATE SERPL-MCNC: 3.5 MG/DL (ref 2.3–4.1)
PLATELET # BLD AUTO: 197 THOUSANDS/UL (ref 149–390)
PMV BLD AUTO: 11.1 FL (ref 8.9–12.7)
POTASSIUM SERPL-SCNC: 4.9 MMOL/L (ref 3.5–5.3)
PROT SERPL-MCNC: 7.3 G/DL (ref 6.4–8.4)
RBC # BLD AUTO: 5.03 MILLION/UL (ref 3.88–5.62)
SODIUM SERPL-SCNC: 140 MMOL/L (ref 135–147)
T4 FREE SERPL-MCNC: 0.93 NG/DL (ref 0.76–1.46)
TSH SERPL DL<=0.05 MIU/L-ACNC: 5.37 UIU/ML (ref 0.45–4.5)
WBC # BLD AUTO: 6.76 THOUSAND/UL (ref 4.31–10.16)

## 2023-02-14 LAB
EST. AVERAGE GLUCOSE BLD GHB EST-MCNC: 157 MG/DL
HBA1C MFR BLD: 7.1 %
PSA FREE MFR SERPL: 34.5 %
PSA FREE SERPL-MCNC: 2.83 NG/ML
PSA SERPL-MCNC: 8.2 NG/ML (ref 0–4)

## 2023-04-27 ENCOUNTER — OFFICE VISIT (OUTPATIENT)
Dept: INTERNAL MEDICINE CLINIC | Facility: CLINIC | Age: 88
End: 2023-04-27

## 2023-04-27 VITALS
SYSTOLIC BLOOD PRESSURE: 132 MMHG | RESPIRATION RATE: 18 BRPM | BODY MASS INDEX: 24.48 KG/M2 | TEMPERATURE: 98 F | DIASTOLIC BLOOD PRESSURE: 64 MMHG | HEART RATE: 51 BPM | OXYGEN SATURATION: 97 % | WEIGHT: 171 LBS | HEIGHT: 70 IN

## 2023-04-27 DIAGNOSIS — G20 PARKINSON'S DISEASE (HCC): ICD-10-CM

## 2023-04-27 DIAGNOSIS — I73.9 PERIPHERAL VASCULAR DISEASE, UNSPECIFIED (HCC): ICD-10-CM

## 2023-04-27 DIAGNOSIS — I10 BENIGN ESSENTIAL HYPERTENSION: ICD-10-CM

## 2023-04-27 DIAGNOSIS — Z79.4 TYPE 2 DIABETES MELLITUS WITH DIABETIC PERIPHERAL ANGIOPATHY WITHOUT GANGRENE, WITH LONG-TERM CURRENT USE OF INSULIN (HCC): ICD-10-CM

## 2023-04-27 DIAGNOSIS — E11.51 TYPE 2 DIABETES MELLITUS WITH DIABETIC PERIPHERAL ANGIOPATHY WITHOUT GANGRENE, WITH LONG-TERM CURRENT USE OF INSULIN (HCC): ICD-10-CM

## 2023-04-27 DIAGNOSIS — R00.1 BRADYCARDIA: ICD-10-CM

## 2023-04-27 DIAGNOSIS — Z00.00 MEDICARE ANNUAL WELLNESS VISIT, SUBSEQUENT: Primary | ICD-10-CM

## 2023-04-27 NOTE — ASSESSMENT & PLAN NOTE
Is followed closely by his endocrinologist and he has had consistently relatively good control of his diabetes with present treatment  He does have some evidence of diabetic peripheral neuropathy and he continues to see podiatry, he states he has not been following up with his ophthalmologist and we did stress to the patient the importance of eye care especially in light of diabetes    Lab Results   Component Value Date    HGBA1C 7 1 (H) 02/13/2023

## 2023-04-27 NOTE — ASSESSMENT & PLAN NOTE
Continues to follow-up with his cardiologist   Bradycardia  Has had no changes with his medication    Denies any lightheadedness dizziness

## 2023-04-27 NOTE — PROGRESS NOTES
Name: Wilfredo Martinez  : 1935      MRN: [id number]  Encounter Provider: Agusto Stevens DO  Encounter Date: 2023   Encounter department: [address] [place] [address] INTERNAL MEDICINE    Assessment & Plan     1  Medicare annual wellness visit, subsequent  Assessment & Plan:  Patient is a 50-year-old male history of extensive medical problems outlined previously who is here today for a Medicare wellness visit  He has had extensive lab testing done over the past 6 months through his specialist including his endocrinologist, cardiologist, urologist   He also continues to follow-up with his neurologist regarding his Parkinson disease  Patient states in general doing about the same  Denies any new medical problems or concerns  He denies any chest pain or pressure no increasing shortness of breath with exertion but this is chronic  States no change in his appetite and he continues to stay physically active and is bowling on a regular basis and states that his average is increasing  Did undergo physical exam today in the office with no acute abnormalities  He will continue present medication and surveillance and continue to follow-up with his medical specialist   Return to the office in 4 months      2  Bradycardia  Assessment & Plan:  Continues to follow-up with his cardiologist   Bradycardia  Has had no changes with his medication  Denies any lightheadedness dizziness      3  Parkinson's disease [facility])  Assessment & Plan:  Remains on Sinemet as per his neurologist   Patient denies any new difficulties from a neurologic standpoint  He still remains physically active and again is still bowling on a regular basis  Is pleased with his average increasing with bowling  No new neurologic changes on exam today      4   Type 2 diabetes mellitus with diabetic peripheral 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, with long-term current use of insulin ([facility] [address] )  Assessment & Plan:  Is followed closely by his endocrinologist and he has had consistently relatively good control of his diabetes with present treatment  He does have some evidence of diabetic peripheral neuropathy and he continues to see podiatry, he states he has not been following up with his ophthalmologist and we did stress to the patient the importance of eye care especially in light of diabetes  Lab Results   Component Value Date    HGBA1C 7 1 (H) 02/13/2023         5  Peripheral vascular disease, unspecified ([facility] [address] )  Assessment & Plan:  History of coronary artery disease, peripheral vascular disease  Denies any claudication symptoms and continues on a statin in order to control his cholesterol  He is conscientious as far as diet is concerned not only from the standpoint of peripheral vascular disease coronary artery disease but also with diabetes  6  Benign essential hypertension  Assessment & Plan:  Longstanding history of hypertension  Blood pressure is showing adequate control  Patient will continue present medication and surveillance  His renal function is stable and this continues to be monitored closely  Subjective     Patient is an 71-year-old male history of extensive medical problems outlined previously who is here today for for routine follow-up  Accompanied by his wife  Patient relates that in general doing well  No new specific complaints or problems  He continues to follow-up with his specialists and is up-to-date with all routine lab testing regarding his multiple medical conditions    Review of Systems   Constitutional: Negative  HENT: Negative  Admits to some decreased auditory acuity but no acute changes  Eyes: Negative  Thinks he needs new glasses but has not followed up with his ophthalmologist   Respiratory: Negative  Some chronic shortness of breath with exertion but not increased   Cardiovascular: Negative  Gastrointestinal: Negative  Endocrine: Negative  Genitourinary: Negative  Musculoskeletal: Negative  Some diffuse arthritic aches and pains but nothing new or disabling at this time   Skin: Negative  Allergic/Immunologic: Negative  Neurological: Negative  Hematological: Negative  Psychiatric/Behavioral: Negative  Past Medical History:   Diagnosis Date   • Diabetes mellitus ([facility] [address] )    • Hypertension      Past Surgical History:   Procedure Laterality Date   • CARDIAC SURGERY       Family History   Problem Relation Age of Onset   • Diabetes Mother    • Heart attack Mother    • Stroke Father    • Hypertension Father      Social History     Socioeconomic History   • Marital status: /Civil Union     Spouse name: None   • Number of children: None   • Years of education: None   • Highest education level: None   Occupational History   • None   Tobacco Use   • Smoking status: Former     Years: 45 00     Types: Cigarettes   • Smokeless tobacco: Never   Vaping Use   • Vaping Use: Never used   Substance and Sexual Activity   • Alcohol use: Yes     Comment: sparingly   • Drug use: No   • Sexual activity: None   Other Topics Concern   • None   Social History Narrative   • None     Social Determinants of Health     Financial Resource Strain: Low Risk    • Difficulty of Paying Living Expenses: Not very hard   Food Insecurity: Not on file   Transportation Needs: No Transportation Needs   • Lack of Transportation (Medical): No   • Lack of Transportation (Non-Medical):  No   Physical Activity: Not on file   Stress: Not on file   Social Connections: Not on file   Intimate Partner Violence: Not on file   Housing Stability: Not on file     Current Outpatient Medications on File Prior to Visit   Medication Sig   • aspirin (ECOTRIN LOW STRENGTH) 81 mg EC tablet Take 1 tablet by mouth daily   • b complex vitamins capsule Take 1 capsule by mouth daily   • carbidopa-levodopa (SINEMET)  mg per tablet Take 1 5tabs tid   • cholecalciferol (VITAMIN D3) 1,000 units tablet Take 1,000 "Units by mouth daily   • Coenzyme Q10 (COQ10) 100 MG CAPS Take by mouth daily   • insulin detemir (LEVEMIR) 100 units/mL subcutaneous injection Inject 27 Units under the skin daily at bedtime 15units Qam and 18units QHS   • lisinopril (ZESTRIL) 10 mg tablet Take 10 mg by mouth 2 (two) times a day   • Multiple Vitamin (MULTIVITAMIN) tablet Take 1 tablet by mouth daily   • multivitamin (THERAGRAN) TABS Take 1 tablet by mouth daily   • Omega-3 Fatty Acids (FISH OIL) 1,000 mg Take 1,000 mg by mouth every other day     • ONE TOUCH ULTRA TEST test strip    • albuterol (PROVENTIL HFA,VENTOLIN HFA) 90 mcg/act inhaler Inhale 2 puffs every 6 (six) hours as needed for wheezing or shortness of breath (Patient not taking: Reported on 12/21/2022)   • simvastatin (ZOCOR) 40 mg tablet Take 40 mg by mouth daily at bedtime     • [DISCONTINUED] lisinopril (ZESTRIL) 5 mg tablet Take 5 mg by mouth daily (Patient not taking: Reported on 4/27/2023)     Allergies   Allergen Reactions   • Metformin GI Intolerance     Immunization History   Administered Date(s) Administered   • COVID-19 PFIZER VACCINE 0 3 ML IM 01/21/2021, 02/09/2021, 10/30/2021   • Influenza, high dose seasonal 0 7 mL 10/31/2019, 09/16/2020, 11/01/2022       Objective     /64 (BP Location: Left arm, Patient Position: Sitting, Cuff Size: Adult)   Pulse (!) 51   Temp 98 °F (36 7 °C) (Tympanic)   Resp 18   Ht 5' 10\" (1 778 m)   Wt 77 6 kg (171 lb)   SpO2 97%   BMI 24 54 kg/m²     Physical Exam  Vitals and nursing note reviewed  Constitutional:       General: He is not in acute distress  Appearance: Normal appearance  He is normal weight  He is not ill-appearing, toxic-appearing or diaphoretic  Comments: Pleasant articulate 81-year-old male who is awake alert in no acute distress and oriented x3  Accompanied by his wife  HENT:      Head: Normocephalic and atraumatic        Right Ear: Tympanic membrane, ear canal and external ear normal  There is no " impacted cerumen  Left Ear: Tympanic membrane, ear canal and external ear normal  There is no impacted cerumen  Ears:      Comments: Some decreased auditory acuity bilaterally  Does not wear hearing aids     Nose: No congestion or rhinorrhea  Mouth/Throat:      Mouth: Mucous membranes are moist       Pharynx: Oropharynx is clear  No oropharyngeal exudate or posterior oropharyngeal erythema  Eyes:      General: No scleral icterus  Right eye: No discharge  Left eye: No discharge  Extraocular Movements: Extraocular movements intact  Conjunctiva/sclera: Conjunctivae normal       Pupils: Pupils are equal, round, and reactive to light  Neck:      Vascular: No carotid bruit  Cardiovascular:      Rate and Rhythm: Regular rhythm  Bradycardia present  Heart sounds: Normal heart sounds  No murmur heard  No friction rub  No gallop  Pulmonary:      Effort: Pulmonary effort is normal  No respiratory distress  Breath sounds: Normal breath sounds  No stridor  No wheezing, rhonchi or rales  Chest:      Chest wall: No tenderness  Abdominal:      General: Abdomen is flat  Bowel sounds are normal  There is no distension  Palpations: Abdomen is soft  There is no mass  Tenderness: There is no abdominal tenderness  There is no right CVA tenderness, left CVA tenderness, guarding or rebound  Hernia: No hernia is present  Genitourinary:     Comments: Defers exam, sees urologist  Musculoskeletal:         General: Deformity present  No swelling, tenderness or signs of injury  Normal range of motion  Cervical back: Normal range of motion and neck supple  No rigidity or tenderness  Right lower leg: No edema  Left lower leg: No edema  Comments: Diffuse arthritic changes as noted previously nothing acute   Lymphadenopathy:      Cervical: No cervical adenopathy  Skin:     General: Skin is warm and dry        Capillary Refill: Capillary refill takes less than 2 seconds  Coloration: Skin is not jaundiced or pale  Findings: No bruising, erythema, lesion or rash  Neurological:      Mental Status: He is alert and oriented to person, place, and time  Mental status is at baseline  Cranial Nerves: No cranial nerve deficit  Sensory: Sensory deficit present  Motor: No weakness  Coordination: Coordination abnormal       Gait: Gait normal       Deep Tendon Reflexes: Reflexes abnormal       Comments: Patient has had no significant changes with his motor movement secondary to Parkinson disease  States he is still active and is pulling on a regular basis  Absent patellar and Achilles deep tendon reflexes bilaterally  Some paresthesia bilateral lower extremities   Psychiatric:         Mood and Affect: Mood normal          Behavior: Behavior normal          Thought Content:  Thought content normal          Judgment: Judgment normal        Pb Nelson DO

## 2023-04-27 NOTE — ASSESSMENT & PLAN NOTE
Longstanding history of hypertension  Blood pressure is showing adequate control  Patient will continue present medication and surveillance  His renal function is stable and this continues to be monitored closely

## 2023-04-27 NOTE — PROGRESS NOTES
Assessment and Plan:     Problem List Items Addressed This Visit    None       Preventive health issues were discussed with patient, and age appropriate screening tests were ordered as noted in patient's After Visit Summary  Personalized health advice and appropriate referrals for health education or preventive services given if needed, as noted in patient's After Visit Summary       History of Present Illness:     Patient presents for a Medicare Wellness Visit    HPI   Patient Care Team:  Tito Timmons DO as PCP - General (Internal Medicine)  Tito Timmons DO as PCP - PCP-[facility] Attributed-[facility]     Review of Systems:     Review of Systems     Problem List:     Patient Active Problem List   Diagnosis   • Arteriosclerotic cardiovascular disease   • Benign essential hypertension   • Diabetes mellitus ([facility] [address] )   • Enlarged prostate without lower urinary tract symptoms (luts)   • Hyperlipidemia   • Dehydration   • Coronary artery disease involving native coronary artery of native heart   • Lactic acidosis   • Fever   • Dizziness   • Other age-related cataract   • Healthcare maintenance   • Type 2 diabetes mellitus with diabetic peripheral 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 ([facility] [address] )   • Peripheral vascular disease, unspecified ([facility] [address] )   • Gastroenteritis   • Blood in stool, mis   • Tremor   • Right arm weakness   • Parkinson's disease (HCC)   • Bradycardia   • Shortness of breath   • PANTOJA (dyspnea on exertion)   • COVID-19      Past Medical and Surgical History:     Past Medical History:   Diagnosis Date   • Diabetes mellitus ([facility] [address] )    • Hypertension      Past Surgical History:   Procedure Laterality Date   • CARDIAC SURGERY        Family History:     Family History   Problem Relation Age of Onset   • Diabetes Mother    • Heart attack Mother    • Stroke Father    • Hypertension Father       Social History:     Social History     Socioeconomic History   • Marital status: /Civil Union     Spouse name: None   • Number of children: None   • Years of education: None   • Highest education level: None   Occupational History   • None   Tobacco Use   • Smoking status: Former     Years: 45 00     Types: Cigarettes   • Smokeless tobacco: Never   Vaping Use   • Vaping Use: Never used   Substance and Sexual Activity   • Alcohol use: Yes     Comment: sparingly   • Drug use: No   • Sexual activity: None   Other Topics Concern   • None   Social History Narrative   • None     Social Determinants of Health     Financial Resource Strain: Not on file   Food Insecurity: Not on file   Transportation Needs: Not on file   Physical Activity: Not on file   Stress: Not on file   Social Connections: Not on file   Intimate Partner Violence: Not on file   Housing Stability: Not on file      Medications and Allergies:     Current Outpatient Medications   Medication Sig Dispense Refill   • aspirin (ECOTRIN LOW STRENGTH) 81 mg EC tablet Take 1 tablet by mouth daily     • b complex vitamins capsule Take 1 capsule by mouth daily     • carbidopa-levodopa (SINEMET)  mg per tablet Take 1 5tabs tid 450 tablet 3   • cholecalciferol (VITAMIN D3) 1,000 units tablet Take 1,000 Units by mouth daily     • Coenzyme Q10 (COQ10) 100 MG CAPS Take by mouth daily     • insulin detemir (LEVEMIR) 100 units/mL subcutaneous injection Inject 27 Units under the skin daily at bedtime 15units Qam and 18units QHS     • lisinopril (ZESTRIL) 10 mg tablet Take 10 mg by mouth 2 (two) times a day     • Multiple Vitamin (MULTIVITAMIN) tablet Take 1 tablet by mouth daily     • multivitamin (THERAGRAN) TABS Take 1 tablet by mouth daily     • Omega-3 Fatty Acids (FISH OIL) 1,000 mg Take 1,000 mg by mouth every other day       • ONE TOUCH ULTRA TEST test strip      • albuterol (PROVENTIL HFA,VENTOLIN HFA) 90 mcg/act inhaler Inhale 2 puffs every 6 (six) hours as needed for wheezing or shortness of breath (Patient not taking: Reported on 12/21/2022) 1 Inhaler 5   • lisinopril (ZESTRIL) 5 mg tablet Take 5 mg by mouth daily (Patient not taking: Reported on 4/27/2023)     • simvastatin (ZOCOR) 40 mg tablet Take 40 mg by mouth daily at bedtime         No current facility-administered medications for this visit  Allergies   Allergen Reactions   • Metformin GI Intolerance      Immunizations:     Immunization History   Administered Date(s) Administered   • COVID-19 PFIZER VACCINE 0 3 ML IM 01/21/2021, 02/09/2021, 10/30/2021   • Influenza, high dose seasonal 0 7 mL 10/31/2019, 09/16/2020, 11/01/2022      Health Maintenance: There are no preventive care reminders to display for this patient  Topic Date Due   • Pneumococcal Vaccine: 65+ Years (1 - PCV) Never done   • COVID-19 Vaccine (4 - Booster for Pfizer series) 12/25/2021      Medicare Screening Tests and Risk Assessments:     Giselle Fagan is here for his Subsequent Wellness visit  Last Medicare Wellness visit information reviewed, patient interviewed and updates made to the record today  Health Risk Assessment:   Patient rates overall health as good  Patient feels that their physical health rating is same  Patient is satisfied with their life  Eyesight was rated as same  Hearing was rated as same  Patient feels that their emotional and mental health rating is same  Patients states they are sometimes angry  Patient states they are sometimes unusually tired/fatigued  Pain experienced in the last 7 days has been some  Patient's pain rating has been 5/10  Patient states that he has experienced no weight loss or gain in last 6 months  Fall Risk Screening: In the past year, patient has experienced: no history of falling in past year      Home Safety:  Patient does not have trouble with stairs inside or outside of their home  Patient has working smoke alarms and has working carbon monoxide detector  Home safety hazards include: none  Nutrition:   Current diet is Regular       Medications:   Patient is not currently taking any over-the-counter supplements  Patient is able to manage medications  Activities of Daily Living (ADLs)/Instrumental Activities of Daily Living (IADLs):   Walk and transfer into and out of bed and chair?: Yes  Dress and groom yourself?: Yes    Bathe or shower yourself?: Yes    Feed yourself? Yes  Do your laundry/housekeeping?: Yes  Manage your money, pay your bills and track your expenses?: Yes  Make your own meals?: No    Do your own shopping?: No    ADL comments: Wife helps    Previous Hospitalizations:   Any hospitalizations or ED visits within the last 12 months?: No      Advance Care Planning:   Living will: Yes    Advanced directive: Yes      PREVENTIVE SCREENINGS      Cardiovascular Screening:    General: Screening Not Indicated and History Lipid Disorder      Diabetes Screening:     General: Screening Not Indicated and History Diabetes      Colorectal Cancer Screening:     General: Screening Not Indicated      Prostate Cancer Screening:    General: Screening Not Indicated      Abdominal Aortic Aneurysm (AAA) Screening:    Risk factors include: tobacco use        Lung Cancer Screening:     General: Screening Not Indicated    Screening, Brief Intervention, and Referral to Treatment (SBIRT)    Screening  Typical number of drinks in a day: 0  Typical number of drinks in a week: 0  Interpretation: Low risk drinking behavior      AUDIT-C Screenin) How often did you have a drink containing alcohol in the past year? never  2) How many drinks did you have on a typical day when you were drinking in the past year? 0  3) How often did you have 6 or more drinks on one occasion in the past year? never    AUDIT-C Score: 0  Interpretation: Score 0-3 (male): Negative screen for alcohol misuse    Single Item Drug Screening:  How often have you used an illegal drug (including marijuana) or a prescription medication for non-medical reasons in the past year? never    Single Item Drug Screen Score: 0  Interpretation: "Negative screen for possible drug use disorder    No results found       Physical Exam:     /64 (BP Location: Left arm, Patient Position: Sitting, Cuff Size: Adult)   Pulse (!) 51   Temp 98 °F (36 7 °C) (Tympanic)   Resp 18   Ht 5' 10\" (1 778 m)   Wt 77 6 kg (171 lb)   SpO2 97%   BMI 24 54 kg/m²     Physical Exam     Zazueta Aus, DO  "

## 2023-04-27 NOTE — ASSESSMENT & PLAN NOTE
Patient is a 81-year-old male history of extensive medical problems outlined previously who is here today for a Medicare wellness visit  He has had extensive lab testing done over the past 6 months through his specialist including his endocrinologist, cardiologist, urologist   He also continues to follow-up with his neurologist regarding his Parkinson disease  Patient states in general doing about the same  Denies any new medical problems or concerns  He denies any chest pain or pressure no increasing shortness of breath with exertion but this is chronic  States no change in his appetite and he continues to stay physically active and is bowling on a regular basis and states that his average is increasing  Did undergo physical exam today in the office with no acute abnormalities    He will continue present medication and surveillance and continue to follow-up with his medical specialist   Return to the office in 4 months

## 2023-04-27 NOTE — ASSESSMENT & PLAN NOTE
Remains on Sinemet as per his neurologist   Patient denies any new difficulties from a neurologic standpoint  He still remains physically active and again is still bowling on a regular basis  Is pleased with his average increasing with bowling    No new neurologic changes on exam today

## 2023-04-27 NOTE — ASSESSMENT & PLAN NOTE
History of coronary artery disease, peripheral vascular disease  Denies any claudication symptoms and continues on a statin in order to control his cholesterol  He is conscientious as far as diet is concerned not only from the standpoint of peripheral vascular disease coronary artery disease but also with diabetes

## 2023-05-02 ENCOUNTER — OFFICE VISIT (OUTPATIENT)
Dept: NEUROLOGY | Facility: CLINIC | Age: 88
End: 2023-05-02

## 2023-05-02 VITALS
WEIGHT: 173.2 LBS | TEMPERATURE: 97.8 F | DIASTOLIC BLOOD PRESSURE: 78 MMHG | BODY MASS INDEX: 24.85 KG/M2 | SYSTOLIC BLOOD PRESSURE: 134 MMHG

## 2023-05-02 DIAGNOSIS — G20 PARKINSON'S DISEASE (HCC): Primary | ICD-10-CM

## 2023-05-02 NOTE — ASSESSMENT & PLAN NOTE
Patient continues to do very well from a Parkinson's standpoint  He does not notice any tremors  He is able to perform all of his ADLs independently  He remains active by doing things around the house such as mowing as well as bowling 3 days a week  He continues to ambulate independently  On exam today he does have some mild to moderate bradykinesia however this is not bothersome to him  Overall he is very happy with how he is currently doing and no further changes will be made  If however he starts to notice any changes we could certainly consider increasing his Sinemet dose further  In the past he did have significant improvement with higher dosing

## 2023-05-02 NOTE — PROGRESS NOTES
Patient ID: Rocky Boles  is a 81 y o  male  Assessment/Plan:    Parkinson's disease [facility])  Patient continues to do very well from a Parkinson's standpoint  He does not notice any tremors  He is able to perform all of his ADLs independently  He remains active by doing things around the house such as mowing as well as bowling 3 days a week  He continues to ambulate independently  On exam today he does have some mild to moderate bradykinesia however this is not bothersome to him  Overall he is very happy with how he is currently doing and no further changes will be made  If however he starts to notice any changes we could certainly consider increasing his Sinemet dose further  In the past he did have significant improvement with higher dosing  Subjective:    Rocky Boles is an 81year old RH male with CAD, HTN, DM, PVD, HLD and Parkinson's disease who presents for follow up  To review, bilateral hand tremors present since around 2018 with significant improvement per his wife with the initiation of carbidopa/levodopa  On initial presentation he had minimal parkinsonism with rare right-sided myoclonus of the arm and leg  At his last visit he was doing very well with Sinemet and no changes were made          INTERVAL HISTORY:  He feels that he is overall doing well   No changes since the last visit   Tremors are very well controlled   He is able to perform all of his ADLs on his own   Wife does the cooking however he helps in the kitchen   No issues with swallowing   He sleeps well, wakes 1-2 times to use the bathroom   He feels that his memory is still very good   No hallucinations   He is still bowling 3 times a week, he has increased his bowling ball weight recently   He also uses a push mower once a week   He also tends to his garden at home      Current medications and timing:  Sinemet 25/100 1 5 tabs TID @ 9:30 3:30 9:30 - increasing doses has helped with tremor control      I personally reviewed and updated the ROS  Objective:    Blood pressure 134/78, temperature 97 8 °F (36 6 °C), weight 78 6 kg (173 lb 3 2 oz)  Physical Exam  Constitutional:       Appearance: Normal appearance  HENT:      Right Ear: Hearing normal       Left Ear: Hearing normal    Eyes:      General: Lids are normal       Extraocular Movements: Extraocular movements intact  Pupils: Pupils are equal, round, and reactive to light  Pulmonary:      Effort: Pulmonary effort is normal    Neurological:      Mental Status: He is alert  Motor: Motor strength is normal    Psychiatric:         Speech: Speech normal          Neurological Exam  Mental Status  Alert  Oriented to person, place and time  Speech is normal     Cranial Nerves  CN III, IV, VI: Extraocular movements intact bilaterally  Normal lids and orbits bilaterally  Pupils equal round and reactive to light bilaterally  CN V:  Right: Facial sensation is normal   Left: Facial sensation is normal on the left  CN VIII:  Right: Hearing is normal   Left: Hearing is normal   CN XI: Shoulder shrug strength is normal   CN XII: Tongue midline without atrophy or fasciculations  Motor   Strength is 5/5 throughout all four extremities  Sensory  Light touch is normal in upper and lower extremities  Reflexes  Glabellar tap present  Coordination  Right: Finger-to-nose abnormality:Left: Finger-to-nose abnormality:    Gait    Arose without issues  Slightly decreased stride, slightly wider based gait  Stooped posture  Woodrow Amen        UPDRS motor:                              Time since last dose:  5/2/23 6/28/22   Speech  1 1   Facial Expression  0 0   Rigidity - Neck        Rigidity - Upper Extremity (Right)  0 0   Rigidity - Upper Extremity (Left)   0 0   Rigidity - Lower Extremity (Right)  0 0   Rigidity - Lower Extremity (Left)   0 0   Finger Taps (Right)   2 2   Finger Taps (Left)   2 2   Hand Movement (Right)  2 2   Hand Movement (Left)   2 1 Pronation/Supination (Right)  1 1   Pronation/Supination (Left)   1 1   Toe Tapping (Right) 1 1   Toe Tapping (Left) 1 0   Leg Agility (Right)  0 1   Leg Agility (Left)   0 1   Arising from Chair   0 0   Gait   1 1   Freezing of Gait 0 0   Postural Stability         Posture 1 1   Global spontaneity of movement 1 1   Postural Tremor (Right) 0 0   Postural Tremor (Left) 0 0   Kinetic Tremor (Right)  0 0   Kinetic Tremor (Left)  0 0   Rest tremor amplitude RUE 0 0   Rest tremor amplitude LUE 0 0   Rest tremor amplitude RLE 0 0   Reset tremor amplitude LLE 0 0   Lip/Jaw Tremor  0 0   Consistency of tremor 0 0   Motor Exam Total:            ROS:    Review of Systems   Constitutional: Negative  Negative for appetite change and fever  HENT: Negative  Negative for hearing loss, tinnitus, trouble swallowing and voice change  Eyes: Negative  Negative for photophobia, pain and visual disturbance  Respiratory: Negative  Negative for shortness of breath  Cardiovascular: Negative  Negative for palpitations  Gastrointestinal: Negative  Negative for nausea and vomiting  Endocrine: Negative  Negative for cold intolerance  Genitourinary: Negative  Negative for dysuria, frequency and urgency  Musculoskeletal: Positive for myalgias (Sometimes in legs and knees)  Negative for gait problem and neck pain  Skin: Negative  Negative for rash  Allergic/Immunologic: Negative  Neurological: Negative  Negative for dizziness, tremors, seizures, syncope, facial asymmetry, speech difficulty, weakness, light-headedness, numbness and headaches  Hematological: Bruises/bleeds easily (Bruise)  Psychiatric/Behavioral: Positive for sleep disturbance (Sometimes)  Negative for confusion and hallucinations  All other systems reviewed and are negative

## 2023-06-26 PROBLEM — Z00.00 MEDICARE ANNUAL WELLNESS VISIT, SUBSEQUENT: Status: RESOLVED | Noted: 2018-10-04 | Resolved: 2023-06-26

## 2023-08-03 ENCOUNTER — CATARACT CONSULTATION (OUTPATIENT)
Dept: URBAN - METROPOLITAN AREA CLINIC 6 | Facility: CLINIC | Age: 88
End: 2023-08-03

## 2023-08-03 DIAGNOSIS — H25.812: ICD-10-CM

## 2023-08-03 DIAGNOSIS — E10.9: ICD-10-CM

## 2023-08-03 DIAGNOSIS — H35.3131: ICD-10-CM

## 2023-08-03 DIAGNOSIS — H52.11: ICD-10-CM

## 2023-08-03 DIAGNOSIS — H43.811: ICD-10-CM

## 2023-08-03 PROCEDURE — 99204 OFFICE O/P NEW MOD 45 MIN: CPT

## 2023-08-03 ASSESSMENT — TONOMETRY
OS_IOP_MMHG: 11
OD_IOP_MMHG: 9

## 2023-08-03 ASSESSMENT — VISUAL ACUITY
OD_GLARE: 20/100
OS_GLARE: 20/80
OS_CC: 20/50
OD_CC: J5
OD_CC: 20/40
OS_CC: J7

## 2023-08-03 ASSESSMENT — KERATOMETRY
OS_K1POWER_DIOPTERS: 43.00
OD_K1POWER_DIOPTERS: 42.25
OS_AXISANGLE_DEGREES: 78
OD_AXISANGLE2_DEGREES: 177
OD_K2POWER_DIOPTERS: 45.00
OS_K2POWER_DIOPTERS: 45.00
OS_AXISANGLE2_DEGREES: 168
OD_AXISANGLE_DEGREES: 87

## 2023-08-24 ENCOUNTER — APPOINTMENT (OUTPATIENT)
Dept: LAB | Facility: CLINIC | Age: 88
End: 2023-08-24
Payer: COMMERCIAL

## 2023-08-24 DIAGNOSIS — N40.0 ENLARGED PROSTATE WITHOUT LOWER URINARY TRACT SYMPTOMS (LUTS): ICD-10-CM

## 2023-08-24 DIAGNOSIS — I51.9 MYXEDEMA HEART DISEASE: ICD-10-CM

## 2023-08-24 DIAGNOSIS — E03.9 MYXEDEMA HEART DISEASE: ICD-10-CM

## 2023-08-24 DIAGNOSIS — E78.5 HYPERLIPIDEMIA, UNSPECIFIED HYPERLIPIDEMIA TYPE: ICD-10-CM

## 2023-08-24 DIAGNOSIS — E11.29 TYPE 2 DIABETES MELLITUS WITH OTHER KIDNEY COMPLICATION, UNSPECIFIED WHETHER LONG TERM INSULIN USE (HCC): ICD-10-CM

## 2023-08-24 DIAGNOSIS — E11.65 INADEQUATELY CONTROLLED DIABETES MELLITUS (HCC): ICD-10-CM

## 2023-08-24 LAB
ALBUMIN SERPL BCP-MCNC: 4 G/DL (ref 3.5–5)
ALP SERPL-CCNC: 52 U/L (ref 34–104)
ALT SERPL W P-5'-P-CCNC: 10 U/L (ref 7–52)
ANION GAP SERPL CALCULATED.3IONS-SCNC: 7 MMOL/L
AST SERPL W P-5'-P-CCNC: 28 U/L (ref 13–39)
BASOPHILS # BLD AUTO: 0.04 THOUSANDS/ÂΜL (ref 0–0.1)
BASOPHILS NFR BLD AUTO: 1 % (ref 0–1)
BILIRUB SERPL-MCNC: 0.74 MG/DL (ref 0.2–1)
BUN SERPL-MCNC: 26 MG/DL (ref 5–25)
CALCIUM SERPL-MCNC: 9.3 MG/DL (ref 8.4–10.2)
CHLORIDE SERPL-SCNC: 104 MMOL/L (ref 96–108)
CHOLEST SERPL-MCNC: 122 MG/DL
CO2 SERPL-SCNC: 29 MMOL/L (ref 21–32)
CREAT SERPL-MCNC: 1.48 MG/DL (ref 0.6–1.3)
EOSINOPHIL # BLD AUTO: 0.18 THOUSAND/ÂΜL (ref 0–0.61)
EOSINOPHIL NFR BLD AUTO: 3 % (ref 0–6)
ERYTHROCYTE [DISTWIDTH] IN BLOOD BY AUTOMATED COUNT: 13.3 % (ref 11.6–15.1)
EST. AVERAGE GLUCOSE BLD GHB EST-MCNC: 151 MG/DL
GFR SERPL CREATININE-BSD FRML MDRD: 41 ML/MIN/1.73SQ M
GLUCOSE P FAST SERPL-MCNC: 123 MG/DL (ref 65–99)
HBA1C MFR BLD: 6.9 %
HCT VFR BLD AUTO: 41.9 % (ref 36.5–49.3)
HDLC SERPL-MCNC: 44 MG/DL
HGB BLD-MCNC: 13.8 G/DL (ref 12–17)
IMM GRANULOCYTES # BLD AUTO: 0.02 THOUSAND/UL (ref 0–0.2)
IMM GRANULOCYTES NFR BLD AUTO: 0 % (ref 0–2)
LDLC SERPL CALC-MCNC: 67 MG/DL (ref 0–100)
LDLC SERPL DIRECT ASSAY-MCNC: 61 MG/DL (ref 0–100)
LYMPHOCYTES # BLD AUTO: 1.17 THOUSANDS/ÂΜL (ref 0.6–4.47)
LYMPHOCYTES NFR BLD AUTO: 19 % (ref 14–44)
MAGNESIUM SERPL-MCNC: 2 MG/DL (ref 1.9–2.7)
MCH RBC QN AUTO: 30.9 PG (ref 26.8–34.3)
MCHC RBC AUTO-ENTMCNC: 32.9 G/DL (ref 31.4–37.4)
MCV RBC AUTO: 94 FL (ref 82–98)
MONOCYTES # BLD AUTO: 0.37 THOUSAND/ÂΜL (ref 0.17–1.22)
MONOCYTES NFR BLD AUTO: 6 % (ref 4–12)
NEUTROPHILS # BLD AUTO: 4.53 THOUSANDS/ÂΜL (ref 1.85–7.62)
NEUTS SEG NFR BLD AUTO: 71 % (ref 43–75)
NONHDLC SERPL-MCNC: 78 MG/DL
NRBC BLD AUTO-RTO: 0 /100 WBCS
PLATELET # BLD AUTO: 174 THOUSANDS/UL (ref 149–390)
PMV BLD AUTO: 11.1 FL (ref 8.9–12.7)
POTASSIUM SERPL-SCNC: 4.6 MMOL/L (ref 3.5–5.3)
PROT SERPL-MCNC: 6.6 G/DL (ref 6.4–8.4)
RBC # BLD AUTO: 4.47 MILLION/UL (ref 3.88–5.62)
SODIUM SERPL-SCNC: 140 MMOL/L (ref 135–147)
TRIGL SERPL-MCNC: 56 MG/DL
TSH SERPL DL<=0.05 MIU/L-ACNC: 2.95 UIU/ML (ref 0.45–4.5)
WBC # BLD AUTO: 6.31 THOUSAND/UL (ref 4.31–10.16)

## 2023-08-24 PROCEDURE — 83036 HEMOGLOBIN GLYCOSYLATED A1C: CPT

## 2023-08-24 PROCEDURE — 84153 ASSAY OF PSA TOTAL: CPT

## 2023-08-24 PROCEDURE — 84443 ASSAY THYROID STIM HORMONE: CPT

## 2023-08-24 PROCEDURE — 83721 ASSAY OF BLOOD LIPOPROTEIN: CPT

## 2023-08-24 PROCEDURE — 83735 ASSAY OF MAGNESIUM: CPT

## 2023-08-24 PROCEDURE — 36415 COLL VENOUS BLD VENIPUNCTURE: CPT

## 2023-08-24 PROCEDURE — 84154 ASSAY OF PSA FREE: CPT

## 2023-08-24 PROCEDURE — 85025 COMPLETE CBC W/AUTO DIFF WBC: CPT

## 2023-08-24 PROCEDURE — 80061 LIPID PANEL: CPT

## 2023-08-24 PROCEDURE — 80053 COMPREHEN METABOLIC PANEL: CPT

## 2023-08-25 LAB
PSA FREE MFR SERPL: 23.2 %
PSA FREE SERPL-MCNC: 2.13 NG/ML
PSA SERPL-MCNC: 9.2 NG/ML (ref 0–4)

## 2023-08-29 ENCOUNTER — OFFICE VISIT (OUTPATIENT)
Dept: NEUROLOGY | Facility: CLINIC | Age: 88
End: 2023-08-29
Payer: COMMERCIAL

## 2023-08-29 ENCOUNTER — OFFICE VISIT (OUTPATIENT)
Dept: INTERNAL MEDICINE CLINIC | Facility: CLINIC | Age: 88
End: 2023-08-29
Payer: COMMERCIAL

## 2023-08-29 VITALS
TEMPERATURE: 98.6 F | SYSTOLIC BLOOD PRESSURE: 134 MMHG | WEIGHT: 168 LBS | DIASTOLIC BLOOD PRESSURE: 76 MMHG | HEART RATE: 52 BPM | HEIGHT: 70 IN | BODY MASS INDEX: 24.05 KG/M2 | OXYGEN SATURATION: 98 %

## 2023-08-29 VITALS
SYSTOLIC BLOOD PRESSURE: 137 MMHG | BODY MASS INDEX: 24.32 KG/M2 | TEMPERATURE: 97.7 F | DIASTOLIC BLOOD PRESSURE: 82 MMHG | WEIGHT: 169.5 LBS

## 2023-08-29 DIAGNOSIS — R00.1 BRADYCARDIA: ICD-10-CM

## 2023-08-29 DIAGNOSIS — N40.0 ENLARGED PROSTATE WITHOUT LOWER URINARY TRACT SYMPTOMS (LUTS): ICD-10-CM

## 2023-08-29 DIAGNOSIS — G20 PARKINSON'S DISEASE (HCC): ICD-10-CM

## 2023-08-29 DIAGNOSIS — E78.00 PURE HYPERCHOLESTEROLEMIA: ICD-10-CM

## 2023-08-29 DIAGNOSIS — G20 PARKINSON'S DISEASE (HCC): Primary | ICD-10-CM

## 2023-08-29 DIAGNOSIS — Z79.4 TYPE 2 DIABETES MELLITUS WITH DIABETIC PERIPHERAL ANGIOPATHY WITHOUT GANGRENE, WITH LONG-TERM CURRENT USE OF INSULIN (HCC): ICD-10-CM

## 2023-08-29 DIAGNOSIS — I10 BENIGN ESSENTIAL HYPERTENSION: ICD-10-CM

## 2023-08-29 DIAGNOSIS — I25.10 ARTERIOSCLEROTIC CARDIOVASCULAR DISEASE: Primary | ICD-10-CM

## 2023-08-29 DIAGNOSIS — E11.51 TYPE 2 DIABETES MELLITUS WITH DIABETIC PERIPHERAL ANGIOPATHY WITHOUT GANGRENE, WITH LONG-TERM CURRENT USE OF INSULIN (HCC): ICD-10-CM

## 2023-08-29 PROCEDURE — 99214 OFFICE O/P EST MOD 30 MIN: CPT | Performed by: INTERNAL MEDICINE

## 2023-08-29 PROCEDURE — 99213 OFFICE O/P EST LOW 20 MIN: CPT | Performed by: PHYSICIAN ASSISTANT

## 2023-08-29 NOTE — ASSESSMENT & PLAN NOTE
Patient continues to follow-up with cardiology. He is on medication to prevent recurrence of disease. He stays physically active but has refused further testing including refusing to go for repeat stress testing to follow his cardiac function and whether or not there is any evidence of new lesions to the heart.

## 2023-08-29 NOTE — PROGRESS NOTES
Name: Renato Vogel. : 1935      MRN: [id number]  Encounter Provider: Naveed Mclain DO  Encounter Date: 2023   Encounter department: [facility]    Assessment & Plan     1. Arteriosclerotic cardiovascular disease  Assessment & Plan:  Patient continues to follow-up with cardiology. He is on medication to prevent recurrence of disease. He stays physically active but has refused further testing including refusing to go for repeat stress testing to follow his cardiac function and whether or not there is any evidence of new lesions to the heart. 2. Benign essential hypertension  Assessment & Plan:  Blood pressure and renal function is stable. He will continue present medication and surveillance. 3. Bradycardia  Assessment & Plan:  Patient's heart rate remains on the low. Patient denies any lightheadedness or dizziness or symptomatology. Patient is not on a beta-blocker. Was told to call if any changes and he continues to have this monitored through his specialists      4. Enlarged prostate without lower urinary tract symptoms (luts)  Assessment & Plan:  Continues to follow-up with his urologist and his PSA continues to elevate. Denies any new urinary symptoms. 5. Pure hypercholesterolemia  Assessment & Plan:  History of hyperlipidemia. He remains on simvastatin. Admits that he is not always perfect with following up with his diet. 6. Parkinson's disease ([address])  Assessment & Plan:  History of Parkinson disease. He remains on medication as previously and does follow-up with his neurologist.  Patient states he is physically functional and tries to stay active around the house also goes bowling on a regular basis      7.  Type 2 diabetes mellitus with diabetic peripheral 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, with long-term current use of insulin ([address])  Assessment & Plan:  Continues to follow-up with his endocrinologist and they continue if necessary to make adjustments with medication and treatment. As noted hemoglobin A1c is stable. Up-to-date with foot exam and eye exam  Lab Results   Component Value Date    HGBA1C 6.9 (H) 08/24/2023                Subjective     Patient is a 76-year-old male history of extensive medical problems outlined previously who is here today for follow-up accompanied by his wife. Patient relates in general doing about the same. He continues to be active around the house states he still mows the lawn with his push mower and is still bowling and his [employer] league. Also continues to follow-up with his specialists. He did have extensive lab testing performed prior to the visit today and we did discuss the results    Review of Systems   Constitutional: Negative. HENT: Negative. Eyes: Negative. Respiratory: Negative. Cardiovascular: Negative. Gastrointestinal: Negative. Endocrine: Negative. Genitourinary: Negative. Musculoskeletal: Negative. Skin: Negative. Allergic/Immunologic: Negative. Neurological: Negative. Hematological: Negative. Psychiatric/Behavioral: Negative.         Past Medical History:   Diagnosis Date   • Diabetes mellitus ([address])    • Hypertension      Past Surgical History:   Procedure Laterality Date   • CARDIAC SURGERY       Family History   Problem Relation Age of Onset   • Diabetes Mother    • Heart attack Mother    • Stroke Father    • Hypertension Father      Social History     Socioeconomic History   • Marital status: /Civil Union     Spouse name: None   • Number of children: None   • Years of education: None   • Highest education level: None   Occupational History   • None   Tobacco Use   • Smoking status: Former     Years: 45.00     Types: Cigarettes   • Smokeless tobacco: Never   Vaping Use   • Vaping Use: Never used   Substance and Sexual Activity   • Alcohol use: Yes     Comment: sparingly   • Drug use: No   • Sexual activity: None   Other Topics Concern   • None Social History Narrative   • None     Social Determinants of Health     Financial Resource Strain: Low Risk  (4/27/2023)    Overall Financial Resource Strain (CARDIA)    • Difficulty of Paying Living Expenses: Not very hard   Food Insecurity: Not on file   Transportation Needs: No Transportation Needs (4/27/2023)    PRAPARE - Transportation    • Lack of Transportation (Medical): No    • Lack of Transportation (Non-Medical):  No   Physical Activity: Not on file   Stress: Not on file   Social Connections: Not on file   Intimate Partner Violence: Not on file   Housing Stability: Not on file     Current Outpatient Medications on File Prior to Visit   Medication Sig   • aspirin (ECOTRIN LOW STRENGTH) 81 mg EC tablet Take 1 tablet by mouth daily   • b complex vitamins capsule Take 1 capsule by mouth daily   • carbidopa-levodopa (SINEMET)  mg per tablet TAKE 1 & 1/2 (ONE & ONE-HALF) TABLETS BY MOUTH THREE TIMES DAILY   • cholecalciferol (VITAMIN D3) 1,000 units tablet Take 1,000 Units by mouth daily   • Coenzyme Q10 (COQ10) 100 MG CAPS Take by mouth daily   • insulin detemir (LEVEMIR) 100 units/mL subcutaneous injection Inject 27 Units under the skin daily at bedtime 15units Qam and 18units QHS   • lisinopril (ZESTRIL) 10 mg tablet Take 10 mg by mouth 2 (two) times a day   • Multiple Vitamin (MULTIVITAMIN) tablet Take 1 tablet by mouth daily   • Omega-3 Fatty Acids (FISH OIL) 1,000 mg Take 1,000 mg by mouth every other day     • ONE TOUCH ULTRA TEST test strip    • simvastatin (ZOCOR) 40 mg tablet Take 40 mg by mouth daily at bedtime     • albuterol (PROVENTIL HFA,VENTOLIN HFA) 90 mcg/act inhaler Inhale 2 puffs every 6 (six) hours as needed for wheezing or shortness of breath (Patient not taking: Reported on 12/21/2022)   • multivitamin (THERAGRAN) TABS Take 1 tablet by mouth daily (Patient not taking: Reported on 8/29/2023)     Allergies   Allergen Reactions   • Metformin GI Intolerance     Immunization History Administered Date(s) Administered   • COVID-19 PFIZER VACCINE 0.3 ML IM 01/21/2021, 02/09/2021, 10/30/2021   • Influenza, high dose seasonal 0.7 mL 10/31/2019, 09/16/2020, 11/01/2022       Objective     /76 (BP Location: Left arm, Patient Position: Sitting, Cuff Size: Standard)   Pulse (!) 52   Temp 98.6 °F (37 °C)   Ht 5' 9.5" (1.765 m)   Wt 76.2 kg (168 lb)   SpO2 98%   BMI 24.45 kg/m²     Physical Exam  Vitals and nursing note reviewed. Constitutional:       General: He is not in acute distress. Appearance: Normal appearance. He is normal weight. He is not ill-appearing, toxic-appearing or diaphoretic. Comments: 55-year-old male who who is awake alert and oriented x3 who is here today for follow-up accompanied by his wife in no acute distress and oriented x3   HENT:      Head: Normocephalic and atraumatic. Right Ear: Tympanic membrane, ear canal and external ear normal. There is no impacted cerumen. Left Ear: Tympanic membrane, ear canal and external ear normal. There is no impacted cerumen. Ears:      Comments: Some decreased auditory acuity bilaterally. Does not wear hearing aids     Nose: No congestion or rhinorrhea. Mouth/Throat:      Mouth: Mucous membranes are moist.      Pharynx: Oropharynx is clear. No oropharyngeal exudate or posterior oropharyngeal erythema. Eyes:      General: No scleral icterus. Right eye: No discharge. Left eye: No discharge. Extraocular Movements: Extraocular movements intact. Conjunctiva/sclera: Conjunctivae normal.      Pupils: Pupils are equal, round, and reactive to light. Neck:      Vascular: No carotid bruit. Cardiovascular:      Rate and Rhythm: Regular rhythm. Bradycardia present. Heart sounds: Normal heart sounds. No murmur heard. No friction rub. No gallop. Pulmonary:      Effort: Pulmonary effort is normal. No respiratory distress. Breath sounds: Normal breath sounds. No stridor. No wheezing, rhonchi or rales. Chest:      Chest wall: No tenderness. Abdominal:      General: Abdomen is flat. Bowel sounds are normal. There is no distension. Palpations: Abdomen is soft. There is no mass. Tenderness: There is no abdominal tenderness. There is no right CVA tenderness, left CVA tenderness, guarding or rebound. Hernia: No hernia is present. Genitourinary:     Comments: Defers exam, sees urologist  Musculoskeletal:         General: Deformity present. No swelling, tenderness or signs of injury. Normal range of motion. Cervical back: Normal range of motion and neck supple. No rigidity or tenderness. Right lower leg: No edema. Left lower leg: No edema. Comments: Diffuse arthritic changes as noted previously nothing acute   Lymphadenopathy:      Cervical: No cervical adenopathy. Skin:     General: Skin is warm and dry. Capillary Refill: Capillary refill takes less than 2 seconds. Coloration: Skin is not jaundiced or pale. Findings: No bruising, erythema, lesion or rash. Neurological:      Mental Status: He is alert and oriented to person, place, and time. Mental status is at baseline. Cranial Nerves: No cranial nerve deficit. Sensory: Sensory deficit present. Motor: No weakness. Coordination: Coordination abnormal.      Gait: Gait normal.      Deep Tendon Reflexes: Reflexes abnormal.      Comments: Patient has had no significant changes with his motor movement secondary to Parkinson disease. States he is still active and is bowling and mowing the lawn on a regular basis. Absent patellar and Achilles deep tendon reflexes bilaterally. Some paresthesia bilateral lower extremities   Psychiatric:         Mood and Affect: Mood normal.         Behavior: Behavior normal.         Thought Content:  Thought content normal.         Judgment: Judgment normal.       Abdullahi Jones DO

## 2023-08-29 NOTE — ASSESSMENT & PLAN NOTE
Continues to follow-up with his endocrinologist and they continue if necessary to make adjustments with medication and treatment. As noted hemoglobin A1c is stable.   Up-to-date with foot exam and eye exam  Lab Results   Component Value Date    HGBA1C 6.9 (H) 08/24/2023

## 2023-08-29 NOTE — ASSESSMENT & PLAN NOTE
History of hyperlipidemia. He remains on simvastatin. Admits that he is not always perfect with following up with his diet.

## 2023-08-29 NOTE — ASSESSMENT & PLAN NOTE
Patient's heart rate remains on the low. Patient denies any lightheadedness or dizziness or symptomatology. Patient is not on a beta-blocker.   Was told to call if any changes and he continues to have this monitored through his specialists

## 2023-08-29 NOTE — PROGRESS NOTES
Patient ID: Tomi Ledezma. is a 81 y.o. male. Assessment/Plan:    Parkinson's disease [facility]  Patient with Parkinson's disease. He continues to do very well. Tremors remain well controlled. He has no clear on or off with the medications. He is able to perform all ADLs independently. His exam is stable with mild left greater than right bradykinesia. At this time we will not make any changes to his Sinemet dose. He will remain on 1.5 tabs 3 times a day. If symptoms worsen including tremors we could consider increasing the dose further. He was encouraged to remain active. He does continue to do things actively around the home. Subjective:    Tomi Ledezma is an 81year old  male with CAD, HTN, DM, PVD, HLD and Parkinson's disease who presents for follow up. To review, bilateral hand tremors present since around 2018 with significant improvement per his wife with the initiation of carbidopa/levodopa. On initial presentation he had minimal parkinsonism with rare right-sided myoclonus of the arm and leg.      At his last visit he was doing very well with Sinemet and no changes were made. INTERVAL HISTORY:  He feels that he doing well   He feels great   Tremors remain well controlled   He goes bowling three days a week   He mows his grass  He does some gardening   He can perform all of his ADLs on his own   He does have to use the bathroom in the middle of the night, may struggle to get back down   No issues with swallowing   He feels his memory is still good   No hallucinations   No clear on or off with the medication      Current medications and timing:  Sinemet 25/100 1.5 tabs TID @ 9:30 3:30 9:30 - increasing doses has helped with tremor control       I personally reviewed and updated the ROS. Objective:    Blood pressure 137/82, temperature 97.7 °F (36.5 °C), weight 76.9 kg (169 lb 8 oz). Physical Exam  Constitutional:       Appearance: Normal appearance.    HENT: Right Ear: Hearing normal.      Left Ear: Hearing normal.   Eyes:      General: Lids are normal.      Extraocular Movements: Extraocular movements intact. Pupils: Pupils are equal, round, and reactive to light. Pulmonary:      Effort: Pulmonary effort is normal.   Neurological:      Mental Status: He is alert. Motor: Motor strength is normal.  Psychiatric:         Speech: Speech normal.         Neurological Exam  Mental Status  Alert. Oriented to person, place and time. Speech is normal.    Cranial Nerves  CN III, IV, VI: Extraocular movements intact bilaterally. Normal lids and orbits bilaterally. Pupils equal round and reactive to light bilaterally. CN V:  Right: Facial sensation is normal.  Left: Facial sensation is normal on the left. CN VIII:  Right: Hearing is normal.  Left: Hearing is normal.  CN XI: Shoulder shrug strength is normal.  CN XII: Tongue midline without atrophy or fasciculations. Motor   Strength is 5/5 throughout all four extremities. Sensory  Light touch is normal in upper and lower extremities. Reflexes  Glabellar tap present. Coordination  Right: Finger-to-nose abnormality:Left: Finger-to-nose abnormality:    Gait    Arose without issues. Slightly decreased stride, slightly wider based gait. Stooped posture. Darylene Pipe      UPDRS motor:                              Time since last dose:  5/2/23 8/29/23   Speech  1 1   Facial Expression  0 0   Rigidity - Neck        Rigidity - Upper Extremity (Right)  0 0   Rigidity - Upper Extremity (Left)   0 0   Rigidity - Lower Extremity (Right)  0 0   Rigidity - Lower Extremity (Left)   0 0   Finger Taps (Right)   2 1   Finger Taps (Left)   2 2   Hand Movement (Right)  2 1   Hand Movement (Left)   2 1   Pronation/Supination (Right)  1 2   Pronation/Supination (Left)   1 1   Toe Tapping (Right) 1 1   Toe Tapping (Left) 1 0   Leg Agility (Right)  0 1   Leg Agility (Left)   0 1   Arising from Chair   0 0   Gait   1 1   Freezing of Gait 0 0 Postural Stability         Posture 1 1   Global spontaneity of movement 1 1   Postural Tremor (Right) 0 0   Postural Tremor (Left) 0 0   Kinetic Tremor (Right)  0 0   Kinetic Tremor (Left)  0 0   Rest tremor amplitude RUE 0 0   Rest tremor amplitude LUE 0 0   Rest tremor amplitude RLE 0 0   Reset tremor amplitude LLE 0 0   Lip/Jaw Tremor  0 0   Consistency of tremor 0 0   Motor Exam Total:              ROS:    Review of Systems   Constitutional: Negative for appetite change, fatigue and fever. HENT: Negative. Negative for hearing loss, tinnitus, trouble swallowing and voice change. Eyes: Negative. Negative for photophobia, pain and visual disturbance. Respiratory: Negative. Negative for shortness of breath. Cardiovascular: Negative. Negative for palpitations. Gastrointestinal: Negative. Negative for nausea and vomiting. Endocrine: Negative. Negative for cold intolerance. Genitourinary: Negative. Negative for dysuria, frequency and urgency. Musculoskeletal: Positive for myalgias (Arms). Negative for back pain, gait problem and neck pain. Skin: Negative. Negative for rash. Allergic/Immunologic: Negative. Neurological: Negative. Negative for dizziness, tremors, seizures, syncope, facial asymmetry, speech difficulty, weakness, light-headedness, numbness and headaches. Hematological: Bruises/bleeds easily (Bruise). Psychiatric/Behavioral: Positive for sleep disturbance. Negative for confusion and hallucinations. All other systems reviewed and are negative.

## 2023-08-29 NOTE — ASSESSMENT & PLAN NOTE
History of Parkinson disease.   He remains on medication as previously and does follow-up with his neurologist.  Patient states he is physically functional and tries to stay active around the house also goes bowling on a regular basis

## 2023-08-29 NOTE — ASSESSMENT & PLAN NOTE
Patient with Parkinson's disease. He continues to do very well. Tremors remain well controlled. He has no clear on or off with the medications. He is able to perform all ADLs independently. His exam is stable with mild left greater than right bradykinesia. At this time we will not make any changes to his Sinemet dose. He will remain on 1.5 tabs 3 times a day. If symptoms worsen including tremors we could consider increasing the dose further. He was encouraged to remain active. He does continue to do things actively around the home.

## 2023-08-29 NOTE — ASSESSMENT & PLAN NOTE
Continues to follow-up with his urologist and his PSA continues to elevate. Denies any new urinary symptoms.

## 2023-12-28 ENCOUNTER — TELEPHONE (OUTPATIENT)
Dept: INTERNAL MEDICINE CLINIC | Facility: CLINIC | Age: 88
End: 2023-12-28

## 2023-12-28 DIAGNOSIS — J40 BRONCHITIS: Primary | ICD-10-CM

## 2023-12-28 DIAGNOSIS — R06.09 DOE (DYSPNEA ON EXERTION): ICD-10-CM

## 2023-12-28 RX ORDER — ALBUTEROL SULFATE 90 UG/1
2 AEROSOL, METERED RESPIRATORY (INHALATION) EVERY 6 HOURS PRN
Qty: 18 G | Refills: 1 | Status: SHIPPED | OUTPATIENT
Start: 2023-12-28

## 2023-12-28 RX ORDER — BENZONATATE 100 MG/1
100 CAPSULE ORAL 3 TIMES DAILY PRN
Qty: 45 CAPSULE | Refills: 2 | Status: SHIPPED | OUTPATIENT
Start: 2023-12-28

## 2023-12-28 NOTE — TELEPHONE ENCOUNTER
Pt wife called asking for you to prescribe something for the Pt ,Pt  been  having a cough for 6 days now.   Thanks

## 2023-12-28 NOTE — TELEPHONE ENCOUNTER
Discussed with the patient's wife.  Prescription sent to the pharmacy.  See the note on the chart.

## 2023-12-28 NOTE — ASSESSMENT & PLAN NOTE
Patient's wife is calling today.  Patient has had a cough for the past 6 days.  Is somewhat productive.  He has had no fever or chills.  He does have a history of some underlying lung disease and he is not using his inhaler that had been previously prescribed.  At this point in time without seeing the patient we feel we could prescribe something to help with the cough along with his over-the-counter cough medicine which would be an albuterol inhaler and Tessalon Perles.  They are to keep us informed if there is any problems or worsening symptoms

## 2024-01-02 ENCOUNTER — OFFICE VISIT (OUTPATIENT)
Dept: INTERNAL MEDICINE CLINIC | Facility: CLINIC | Age: 89
End: 2024-01-02
Payer: COMMERCIAL

## 2024-01-02 VITALS
OXYGEN SATURATION: 99 % | TEMPERATURE: 97.6 F | WEIGHT: 167 LBS | BODY MASS INDEX: 23.91 KG/M2 | DIASTOLIC BLOOD PRESSURE: 72 MMHG | SYSTOLIC BLOOD PRESSURE: 124 MMHG | HEIGHT: 70 IN | HEART RATE: 60 BPM

## 2024-01-02 DIAGNOSIS — E11.9 TYPE 2 DIABETES MELLITUS WITHOUT COMPLICATION, WITH LONG-TERM CURRENT USE OF INSULIN (HCC): ICD-10-CM

## 2024-01-02 DIAGNOSIS — J40 BRONCHITIS: Primary | ICD-10-CM

## 2024-01-02 DIAGNOSIS — Z79.4 TYPE 2 DIABETES MELLITUS WITHOUT COMPLICATION, WITH LONG-TERM CURRENT USE OF INSULIN (HCC): ICD-10-CM

## 2024-01-02 PROCEDURE — 99213 OFFICE O/P EST LOW 20 MIN: CPT | Performed by: INTERNAL MEDICINE

## 2024-01-02 NOTE — ASSESSMENT & PLAN NOTE
I did discuss patient's upper respiratory tract infection with him on Friday.  Because of his chest congestion cough and wheezing he was placed on an albuterol inhaler that he states has been helping.  Relates over the past 2 days has been improving with less cough and chest congestion.  Still occasionally can have some wheezes.  No fever or chills.  Patient on evaluation today does have some slight erythematous nasal mucosa with clear nasal discharge, erythema to posterior airway again with a clear mucus.  Lungs show decreased breath sounds anterior and posteriorly with a very faint expiratory wheeze heard throughout both lung fields.  The patient and his wife are both feeling better overall and we have had improvement with symptomatic treatment.  He is afebrile O2 sat is 99% and I feel that there is no change in treatment that is indicated at this point in time.  He was told the importance of keeping in touch with our office and if any changes or worsening of symptoms to please call

## 2024-01-02 NOTE — PROGRESS NOTES
Name: Trent Tam Jr.      : 1935      MRN: [id number]  Encounter Provider: Jaswant Hua DO  Encounter Date: 2024   Encounter department: [facility] INTERNAL MEDICINE    Assessment & Plan     1. Bronchitis  Assessment & Plan:  I did discuss patient's upper respiratory tract infection with him on Friday.  Because of his chest congestion cough and wheezing he was placed on an albuterol inhaler that he states has been helping.  Relates over the past 2 days has been improving with less cough and chest congestion.  Still occasionally can have some wheezes.  No fever or chills.  Patient on evaluation today does have some slight erythematous nasal mucosa with clear nasal discharge, erythema to posterior airway again with a clear mucus.  Lungs show decreased breath sounds anterior and posteriorly with a very faint expiratory wheeze heard throughout both lung fields.  The patient and his wife are both feeling better overall and we have had improvement with symptomatic treatment.  He is afebrile O2 sat is 99% and I feel that there is no change in treatment that is indicated at this point in time.  He was told the importance of keeping in touch with our office and if any changes or worsening of symptoms to please call      2. Type 2 diabetes mellitus without complication, with long-term current use of insulin ([facility])  Assessment & Plan:  Patient has been losing weight.  Apparently in order to control his diabetes he has reduced his caloric intake and his wife feels he is not getting adequate nutrition.  He was told that he should try to keep nutritionally stable and make proper food choices.  Continue to avoid simple carbohydrates and concentrated sweets.  Continue to follow-up with his endocrinologist  Lab Results   Component Value Date    HGBA1C 6.9 (H) 2023                Subjective      Patient is here today for evaluation.  Patient and his wife are both suffering from an upper  respiratory tract infection.  Apparently he was having some increased severity of symptoms and he is call on Friday.  Placed on albuterol inhaler which apparently has been helpful to open up his airway and his wife states over the past 2 days he has been improving overall.  Patient relates that he is having some chest congestion and wheezing but this has decreased.  Cough is nonproductive  Review of Systems   Constitutional:  Positive for activity change and appetite change. Negative for chills, diaphoresis, fatigue, fever and unexpected weight change.        Some mild restriction in activity level secondary to acute illness.   HENT:  Positive for congestion and postnasal drip. Negative for dental problem, drooling, ear discharge, ear pain, facial swelling, hearing loss, mouth sores, nosebleeds, rhinorrhea and sinus pain.    Eyes: Negative.    Respiratory:  Positive for cough and wheezing. Negative for apnea, choking, chest tightness, shortness of breath and stridor.    Cardiovascular: Negative.    Gastrointestinal: Negative.    Endocrine: Negative.    Genitourinary: Negative.    Musculoskeletal: Negative.    Skin: Negative.    Allergic/Immunologic: Negative.    Neurological: Negative.    Hematological: Negative.    Psychiatric/Behavioral: Negative.       Current Outpatient Medications on File Prior to Visit   Medication Sig   • albuterol (PROVENTIL HFA,VENTOLIN HFA) 90 mcg/act inhaler Inhale 2 puffs every 6 (six) hours as needed for wheezing or shortness of breath   • aspirin (ECOTRIN LOW STRENGTH) 81 mg EC tablet Take 1 tablet by mouth daily   • b complex vitamins capsule Take 1 capsule by mouth daily   • benzonatate (TESSALON PERLES) 100 mg capsule Take 1 capsule (100 mg total) by mouth 3 (three) times a day as needed for cough   • carbidopa-levodopa (SINEMET)  mg per tablet TAKE 1 & 1/2 (ONE & ONE-HALF) TABLETS BY MOUTH THREE TIMES DAILY   • cholecalciferol (VITAMIN D3) 1,000 units tablet Take 1,000 Units  "by mouth daily   • Coenzyme Q10 (COQ10) 100 MG CAPS Take by mouth daily   • insulin detemir (LEVEMIR) 100 units/mL subcutaneous injection Inject 27 Units under the skin daily at bedtime 15units Qam and 18units QHS   • lisinopril (ZESTRIL) 10 mg tablet Take 10 mg by mouth 2 (two) times a day   • Multiple Vitamin (MULTIVITAMIN) tablet Take 1 tablet by mouth daily   • Omega-3 Fatty Acids (FISH OIL) 1,000 mg Take 1,000 mg by mouth every other day     • ONE TOUCH ULTRA TEST test strip    • simvastatin (ZOCOR) 40 mg tablet Take 40 mg by mouth daily at bedtime     • multivitamin (THERAGRAN) TABS Take 1 tablet by mouth daily (Patient not taking: Reported on 8/29/2023)       Objective     /72 (BP Location: Left arm, Patient Position: Sitting, Cuff Size: Standard)   Pulse 60   Temp 97.6 °F (36.4 °C)   Ht 5' 9.5\" (1.765 m)   Wt 75.8 kg (167 lb)   SpO2 99%   BMI 24.31 kg/m²     Physical Exam  Vitals and nursing note reviewed.   Constitutional:       General: He is not in acute distress.     Appearance: Normal appearance. He is normal weight. He is not ill-appearing, toxic-appearing or diaphoretic.      Comments: Soft-spoken 88-year-old male who is awake alert in no acute distress oriented x 3 accompanied by his wife   HENT:      Head: Normocephalic and atraumatic.      Right Ear: Tympanic membrane, ear canal and external ear normal. There is no impacted cerumen.      Left Ear: Tympanic membrane, ear canal and external ear normal. There is no impacted cerumen.      Nose: Congestion and rhinorrhea present.      Comments: Some slight erythema to nasal mucosa with a clear nasal discharge     Mouth/Throat:      Mouth: Mucous membranes are moist.      Pharynx: Oropharyngeal exudate and posterior oropharyngeal erythema present.      Comments: Erythema to posterior airway with a whitish postnasal drip  Eyes:      General: No scleral icterus.        Right eye: No discharge.         Left eye: No discharge.      Extraocular " Movements: Extraocular movements intact.      Conjunctiva/sclera: Conjunctivae normal.      Pupils: Pupils are equal, round, and reactive to light.   Neck:      Vascular: No carotid bruit.   Cardiovascular:      Rate and Rhythm: Normal rate and regular rhythm.      Heart sounds:      No friction rub. No gallop.   Pulmonary:      Effort: No respiratory distress.      Breath sounds: No stridor. Wheezing present. No rhonchi or rales.      Comments: Decreased breath sounds anteriorly and posteriorly with faint expiratory wheeze with forced expiration bilaterally  Chest:      Chest wall: No tenderness.   Abdominal:      General: Abdomen is flat. Bowel sounds are normal. There is no distension.      Palpations: Abdomen is soft. There is no mass.      Tenderness: There is no abdominal tenderness. There is no right CVA tenderness, left CVA tenderness, guarding or rebound.      Hernia: No hernia is present.   Musculoskeletal:      Cervical back: Normal range of motion and neck supple. No rigidity or tenderness.   Lymphadenopathy:      Cervical: No cervical adenopathy.   Skin:     General: Skin is warm and dry.   Neurological:      General: No focal deficit present.      Mental Status: He is alert and oriented to person, place, and time. Mental status is at baseline.   Psychiatric:         Mood and Affect: Mood normal.         Behavior: Behavior normal.         Thought Content: Thought content normal.         Judgment: Judgment normal.   Jaswant Hua DO

## 2024-01-02 NOTE — ASSESSMENT & PLAN NOTE
Patient has been losing weight.  Apparently in order to control his diabetes he has reduced his caloric intake and his wife feels he is not getting adequate nutrition.  He was told that he should try to keep nutritionally stable and make proper food choices.  Continue to avoid simple carbohydrates and concentrated sweets.  Continue to follow-up with his endocrinologist  Lab Results   Component Value Date    HGBA1C 6.9 (H) 08/24/2023

## 2024-01-04 ENCOUNTER — TELEPHONE (OUTPATIENT)
Dept: NEUROLOGY | Facility: CLINIC | Age: 89
End: 2024-01-04

## 2024-01-04 NOTE — TELEPHONE ENCOUNTER
Patient called back and will only be seen in the [place] office     Patient offered a few dates on Fridays and patient declined all, stating , No Friday appointments     Please assist in RS this patient Patient will to see DR. Almeida or Cliff Begum     Thank you!

## 2024-01-04 NOTE — TELEPHONE ENCOUNTER
Called pt and LMOM stating that I am calling in regards to r/s upcoming appt as the provider will not be in the office. I then asked the patient to please give us a call back to get the appt scheduled for him.

## 2024-02-09 ENCOUNTER — OFFICE VISIT (OUTPATIENT)
Dept: NEUROLOGY | Facility: CLINIC | Age: 89
End: 2024-02-09
Payer: COMMERCIAL

## 2024-02-09 VITALS
SYSTOLIC BLOOD PRESSURE: 138 MMHG | DIASTOLIC BLOOD PRESSURE: 78 MMHG | WEIGHT: 165.8 LBS | TEMPERATURE: 98.1 F | HEART RATE: 65 BPM | BODY MASS INDEX: 24.13 KG/M2 | OXYGEN SATURATION: 99 %

## 2024-02-09 DIAGNOSIS — G20.A1 PARKINSON'S DISEASE: Primary | ICD-10-CM

## 2024-02-09 PROCEDURE — 99213 OFFICE O/P EST LOW 20 MIN: CPT | Performed by: PHYSICIAN ASSISTANT

## 2024-02-09 NOTE — PROGRESS NOTES
Patient ID: Trent Tam Jr. is a 89 y.o. male.    Assessment/Plan:    Parkinson's disease (HCC)  Patient continues to do very well with low-dose Sinemet.  He denies any on or off with the medication although his wife can notice worsening tremors if he is late for dose.  Overall his exam is stable.  He continues to perform ADLs independently.  At this time we will have him remain on Sinemet 1.5 tabs 3 times a day.  We could consider further increases if symptoms were to worsen in the future.    He was encouraged to remain active.  He does things around the home such as gardening especially when the weather gets warmer.  He also bowls 3 days a week.        Subjective:    Trent Tam Jr. is an 89 year old RH male with CAD, HTN, DM, PVD, HLD and Parkinson's disease who presents for follow up.  To review, bilateral hand tremors present since around 2018 with significant improvement per his wife with the initiation of carbidopa/levodopa. On initial presentation he had minimal parkinsonism with rare right-sided myoclonus of the arm and leg.      At his last visit he was doing very well with Sinemet and no changes were made.        INTERVAL HISTORY:  He feels that he is still doing good   He resides with his wife   He is able to perform all of his ADLs on his own   Sleeps well other than waking 1-2 times night to use the bathroom   No issues with swallowing   He has a good appetite   He goes bowling three times a day week   No falls   Memory is still good  No hallucinations   No clear on or off with the medication, wife can notice some increased tremors when he is late for a dose or right before some doses     Current medications and timing:  Sinemet 25/100 1.5 tabs TID @ 9:30 3:30 9:30 - increasing doses has helped with tremor control        I personally reviewed and updated the ROS.       Objective:    Blood pressure 138/78, pulse 65, temperature 98.1 °F (36.7 °C), weight 75.2 kg (165 lb 12.8 oz), SpO2  99%.    Physical Exam  Constitutional:       Appearance: Normal appearance.   HENT:      Right Ear: Hearing normal.      Left Ear: Hearing normal.   Eyes:      General: Lids are normal.      Extraocular Movements: Extraocular movements intact.      Pupils: Pupils are equal, round, and reactive to light.   Pulmonary:      Effort: Pulmonary effort is normal.   Neurological:      Mental Status: He is alert.      Motor: Motor strength is normal.  Psychiatric:         Speech: Speech normal.         Neurological Exam  Mental Status  Alert. Oriented to person, place and time. Speech is normal.    Cranial Nerves  CN III, IV, VI: Extraocular movements intact bilaterally. Normal lids and orbits bilaterally. Pupils equal round and reactive to light bilaterally.  CN V:  Right: Facial sensation is normal.  Left: Facial sensation is normal on the left.  CN VIII:  Right: Hearing is normal.  Left: Hearing is normal.  CN XI: Shoulder shrug strength is normal.  CN XII: Tongue midline without atrophy or fasciculations.    Motor   Strength is 5/5 throughout all four extremities.    Sensory  Light touch is normal in upper and lower extremities.     Reflexes  Glabellar tap present.    Coordination  Right: Finger-to-nose abnormality:Left: Finger-to-nose abnormality:    Gait    Arose without issues.  Slightly decreased stride, slightly wider based gait. Stooped posture. .         UPDRS motor:                              Time since last dose:  2/9/24 8/29/23   Speech  1 1   Facial Expression  1 0   Rigidity - Neck        Rigidity - Upper Extremity (Right)  0 0   Rigidity - Upper Extremity (Left)   0 0   Rigidity - Lower Extremity (Right)  0 0   Rigidity - Lower Extremity (Left)   0 0   Finger Taps (Right)   1 1   Finger Taps (Left)   2 2   Hand Movement (Right)  2 1   Hand Movement (Left)   2 1   Pronation/Supination (Right)  1 2   Pronation/Supination (Left)   1 1   Toe Tapping (Right) 1 1   Toe Tapping (Left) 1 0   Leg Agility (Right)  0  1   Leg Agility (Left)   0 1   Arising from Chair   0 0   Gait   1 1   Freezing of Gait 0 0   Postural Stability         Posture 1 1   Global spontaneity of movement 1 1   Postural Tremor (Right) 0 0   Postural Tremor (Left) 0 0   Kinetic Tremor (Right)  0 0   Kinetic Tremor (Left)  0 0   Rest tremor amplitude RUE 0 0   Rest tremor amplitude LUE 0 0   Rest tremor amplitude RLE 0 0   Reset tremor amplitude LLE 0 0   Lip/Jaw Tremor  0 0   Consistency of tremor 0 0   Motor Exam Total:          ROS:    Review of Systems   Constitutional:  Negative for appetite change, fatigue and fever.   HENT: Negative.  Negative for hearing loss, tinnitus, trouble swallowing and voice change.    Eyes: Negative.  Negative for photophobia, pain and visual disturbance.   Respiratory: Negative.  Negative for shortness of breath.    Cardiovascular: Negative.  Negative for palpitations.   Gastrointestinal: Negative.  Negative for nausea and vomiting.   Endocrine: Negative.  Negative for cold intolerance.   Genitourinary: Negative.  Negative for dysuria, frequency and urgency.   Musculoskeletal:  Negative for back pain, gait problem, myalgias, neck pain and neck stiffness.   Skin: Negative.  Negative for rash.   Allergic/Immunologic: Negative.    Neurological:  Positive for tremors (states doing better). Negative for dizziness, seizures, syncope, facial asymmetry, speech difficulty, weakness, light-headedness, numbness and headaches.   Hematological: Negative.  Does not bruise/bleed easily.   Psychiatric/Behavioral: Negative.  Negative for confusion, hallucinations and sleep disturbance.

## 2024-02-21 PROBLEM — E86.0 DEHYDRATION: Status: RESOLVED | Noted: 2018-08-23 | Resolved: 2024-02-21

## 2024-02-21 PROBLEM — R50.9 FEVER: Status: RESOLVED | Noted: 2018-08-23 | Resolved: 2024-02-21

## 2024-02-21 PROBLEM — K52.9 GASTROENTERITIS: Status: RESOLVED | Noted: 2019-08-06 | Resolved: 2024-02-21

## 2024-03-05 ENCOUNTER — APPOINTMENT (OUTPATIENT)
Dept: LAB | Facility: CLINIC | Age: 89
End: 2024-03-05
Payer: COMMERCIAL

## 2024-03-05 DIAGNOSIS — N40.0 ENLARGED PROSTATE WITHOUT LOWER URINARY TRACT SYMPTOMS (LUTS): ICD-10-CM

## 2024-03-05 PROCEDURE — 84153 ASSAY OF PSA TOTAL: CPT

## 2024-03-05 PROCEDURE — 36415 COLL VENOUS BLD VENIPUNCTURE: CPT

## 2024-03-05 PROCEDURE — 84154 ASSAY OF PSA FREE: CPT

## 2024-03-06 LAB
PSA FREE MFR SERPL: 23.9 %
PSA FREE SERPL-MCNC: 2.92 NG/ML
PSA SERPL-MCNC: 12.2 NG/ML (ref 0–4)

## 2024-04-04 ENCOUNTER — TRANSCRIBE ORDERS (OUTPATIENT)
Dept: LAB | Facility: CLINIC | Age: 89
End: 2024-04-04

## 2024-04-04 ENCOUNTER — APPOINTMENT (OUTPATIENT)
Dept: LAB | Facility: CLINIC | Age: 89
End: 2024-04-04
Payer: COMMERCIAL

## 2024-04-04 ENCOUNTER — RA CDI HCC (OUTPATIENT)
Dept: OTHER | Facility: HOSPITAL | Age: 89
End: 2024-04-04

## 2024-04-04 DIAGNOSIS — E11.29 TYPE 2 DIABETES MELLITUS WITH OTHER KIDNEY COMPLICATION, UNSPECIFIED WHETHER LONG TERM INSULIN USE (HCC): ICD-10-CM

## 2024-04-04 DIAGNOSIS — N40.1 ENLARGED PROSTATE WITH URINARY OBSTRUCTION: ICD-10-CM

## 2024-04-04 DIAGNOSIS — E11.65 INADEQUATELY CONTROLLED DIABETES MELLITUS (HCC): Primary | ICD-10-CM

## 2024-04-04 DIAGNOSIS — N13.8 ENLARGED PROSTATE WITH URINARY OBSTRUCTION: ICD-10-CM

## 2024-04-04 DIAGNOSIS — E11.65 INADEQUATELY CONTROLLED DIABETES MELLITUS (HCC): ICD-10-CM

## 2024-04-04 LAB
ALBUMIN SERPL BCP-MCNC: 4.1 G/DL (ref 3.5–5)
ALP SERPL-CCNC: 54 U/L (ref 34–104)
ALT SERPL W P-5'-P-CCNC: 5 U/L (ref 7–52)
ANION GAP SERPL CALCULATED.3IONS-SCNC: 8 MMOL/L (ref 4–13)
AST SERPL W P-5'-P-CCNC: 21 U/L (ref 13–39)
BACTERIA UR QL AUTO: NORMAL /HPF
BASOPHILS # BLD AUTO: 0.06 THOUSANDS/ÂΜL (ref 0–0.1)
BASOPHILS NFR BLD AUTO: 1 % (ref 0–1)
BILIRUB SERPL-MCNC: 0.49 MG/DL (ref 0.2–1)
BILIRUB UR QL STRIP: NEGATIVE
BUN SERPL-MCNC: 24 MG/DL (ref 5–25)
CALCIUM SERPL-MCNC: 9.2 MG/DL (ref 8.4–10.2)
CHLORIDE SERPL-SCNC: 104 MMOL/L (ref 96–108)
CLARITY UR: CLEAR
CO2 SERPL-SCNC: 30 MMOL/L (ref 21–32)
COLOR UR: NORMAL
CREAT SERPL-MCNC: 1.43 MG/DL (ref 0.6–1.3)
CREAT UR-MCNC: 56.3 MG/DL
EOSINOPHIL # BLD AUTO: 0.46 THOUSAND/ÂΜL (ref 0–0.61)
EOSINOPHIL NFR BLD AUTO: 8 % (ref 0–6)
ERYTHROCYTE [DISTWIDTH] IN BLOOD BY AUTOMATED COUNT: 13.3 % (ref 11.6–15.1)
EST. AVERAGE GLUCOSE BLD GHB EST-MCNC: 171 MG/DL
GFR SERPL CREATININE-BSD FRML MDRD: 43 ML/MIN/1.73SQ M
GLUCOSE P FAST SERPL-MCNC: 111 MG/DL (ref 65–99)
GLUCOSE UR STRIP-MCNC: NEGATIVE MG/DL
HBA1C MFR BLD: 7.6 %
HCT VFR BLD AUTO: 43.7 % (ref 36.5–49.3)
HGB BLD-MCNC: 14.3 G/DL (ref 12–17)
HGB UR QL STRIP.AUTO: NEGATIVE
IMM GRANULOCYTES # BLD AUTO: 0.02 THOUSAND/UL (ref 0–0.2)
IMM GRANULOCYTES NFR BLD AUTO: 0 % (ref 0–2)
KETONES UR STRIP-MCNC: NEGATIVE MG/DL
LEUKOCYTE ESTERASE UR QL STRIP: NEGATIVE
LYMPHOCYTES # BLD AUTO: 1.28 THOUSANDS/ÂΜL (ref 0.6–4.47)
LYMPHOCYTES NFR BLD AUTO: 22 % (ref 14–44)
MAGNESIUM SERPL-MCNC: 2 MG/DL (ref 1.9–2.7)
MCH RBC QN AUTO: 30.8 PG (ref 26.8–34.3)
MCHC RBC AUTO-ENTMCNC: 32.7 G/DL (ref 31.4–37.4)
MCV RBC AUTO: 94 FL (ref 82–98)
MICROALBUMIN UR-MCNC: 11.7 MG/L
MICROALBUMIN/CREAT 24H UR: 21 MG/G CREATININE (ref 0–30)
MONOCYTES # BLD AUTO: 0.43 THOUSAND/ÂΜL (ref 0.17–1.22)
MONOCYTES NFR BLD AUTO: 7 % (ref 4–12)
NEUTROPHILS # BLD AUTO: 3.69 THOUSANDS/ÂΜL (ref 1.85–7.62)
NEUTS SEG NFR BLD AUTO: 62 % (ref 43–75)
NITRITE UR QL STRIP: NEGATIVE
NON-SQ EPI CELLS URNS QL MICRO: NORMAL /HPF
NRBC BLD AUTO-RTO: 0 /100 WBCS
PH UR STRIP.AUTO: 6 [PH]
PHOSPHATE SERPL-MCNC: 3.4 MG/DL (ref 2.3–4.1)
PLATELET # BLD AUTO: 205 THOUSANDS/UL (ref 149–390)
PMV BLD AUTO: 10.9 FL (ref 8.9–12.7)
POTASSIUM SERPL-SCNC: 4.9 MMOL/L (ref 3.5–5.3)
PROT SERPL-MCNC: 6.8 G/DL (ref 6.4–8.4)
PROT UR STRIP-MCNC: NEGATIVE MG/DL
RBC # BLD AUTO: 4.64 MILLION/UL (ref 3.88–5.62)
RBC #/AREA URNS AUTO: NORMAL /HPF
SODIUM SERPL-SCNC: 142 MMOL/L (ref 135–147)
SP GR UR STRIP.AUTO: 1.01 (ref 1–1.03)
T4 FREE SERPL-MCNC: 0.63 NG/DL (ref 0.61–1.12)
TSH SERPL DL<=0.05 MIU/L-ACNC: 4.62 UIU/ML (ref 0.45–4.5)
UROBILINOGEN UR STRIP-ACNC: <2 MG/DL
WBC # BLD AUTO: 5.94 THOUSAND/UL (ref 4.31–10.16)
WBC #/AREA URNS AUTO: NORMAL /HPF

## 2024-04-04 PROCEDURE — 83735 ASSAY OF MAGNESIUM: CPT

## 2024-04-04 PROCEDURE — 36415 COLL VENOUS BLD VENIPUNCTURE: CPT

## 2024-04-04 PROCEDURE — 84443 ASSAY THYROID STIM HORMONE: CPT

## 2024-04-04 PROCEDURE — 82570 ASSAY OF URINE CREATININE: CPT

## 2024-04-04 PROCEDURE — 81001 URINALYSIS AUTO W/SCOPE: CPT

## 2024-04-04 PROCEDURE — 85025 COMPLETE CBC W/AUTO DIFF WBC: CPT

## 2024-04-04 PROCEDURE — 80053 COMPREHEN METABOLIC PANEL: CPT

## 2024-04-04 PROCEDURE — 83036 HEMOGLOBIN GLYCOSYLATED A1C: CPT

## 2024-04-04 PROCEDURE — 84439 ASSAY OF FREE THYROXINE: CPT

## 2024-04-04 PROCEDURE — 82043 UR ALBUMIN QUANTITATIVE: CPT

## 2024-04-04 PROCEDURE — 84100 ASSAY OF PHOSPHORUS: CPT

## 2024-04-11 ENCOUNTER — OFFICE VISIT (OUTPATIENT)
Dept: INTERNAL MEDICINE CLINIC | Facility: CLINIC | Age: 89
End: 2024-04-11

## 2024-04-11 VITALS
SYSTOLIC BLOOD PRESSURE: 138 MMHG | DIASTOLIC BLOOD PRESSURE: 64 MMHG | HEART RATE: 59 BPM | BODY MASS INDEX: 24.62 KG/M2 | OXYGEN SATURATION: 95 % | WEIGHT: 172 LBS | HEIGHT: 70 IN

## 2024-04-11 DIAGNOSIS — I73.9 PERIPHERAL VASCULAR DISEASE, UNSPECIFIED (HCC): ICD-10-CM

## 2024-04-11 DIAGNOSIS — E11.51 TYPE 2 DIABETES MELLITUS WITH DIABETIC PERIPHERAL ANGIOPATHY WITHOUT GANGRENE, WITH LONG-TERM CURRENT USE OF INSULIN (HCC): ICD-10-CM

## 2024-04-11 DIAGNOSIS — E78.00 PURE HYPERCHOLESTEROLEMIA: ICD-10-CM

## 2024-04-11 DIAGNOSIS — I10 BENIGN ESSENTIAL HYPERTENSION: Primary | ICD-10-CM

## 2024-04-11 DIAGNOSIS — Z79.4 TYPE 2 DIABETES MELLITUS WITH DIABETIC PERIPHERAL ANGIOPATHY WITHOUT GANGRENE, WITH LONG-TERM CURRENT USE OF INSULIN (HCC): ICD-10-CM

## 2024-04-11 NOTE — PROGRESS NOTES
Name: Trent Tam Jr.      : 1935      MRN: [id number]  Encounter Provider: Leena Frost MD  Encounter Date: 2024   Encounter department: [facility] INTERNAL MEDICINE    Assessment & Plan     1. Benign essential hypertension  Assessment & Plan:  BP Readings from Last 3 Encounters:   24 138/64   24 138/78   24 124/72     Within goal of <140/90  Continue current regimen      2. Peripheral vascular disease, unspecified (HCC)  Assessment & Plan:  Hx of CAD  Following with cardiology  Will continue to monitor BP and cholesterol      3. Type 2 diabetes mellitus with diabetic peripheral 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, with long-term current use of insulin (HCC)  Assessment & Plan:    Lab Results   Component Value Date    HGBA1C 7.6 (H) 2024     HA1C trending up from 6.9 to 7.6. Goal A1C <8, remains within goal  Currently managed on Levemir 15 units am and 18 units PM.   Recently switched brand of insulin and reports improved fasting BG levels.   Following with endocrinology, will continue to monitor.       4. Pure hypercholesterolemia  Assessment & Plan:  Lipid panel reviewed and discussed  Controlled on Zocor 40 mg QD  Discussed lifestyle modifications  Will continue to monitor              Subjective     HPI  This is a very pleasant 89 y.o. male who presents to the clinic for management of their chronic medical conditions.  Patient's medical conditions are stable unless noted otherwise above.  Patient has not had any recent hospitalizations, or medical emergencies since last visit.  Patient has no further complaints other than what is mentioned in the ROS.    Review of Systems   Constitutional:  Negative for chills and fever.   HENT:  Negative for congestion, rhinorrhea and sinus pressure.    Respiratory:  Negative for chest tightness, shortness of breath and wheezing.    Cardiovascular:  Negative for chest pain and palpitations.   Gastrointestinal:  Negative for  abdominal pain, nausea and vomiting.   Endocrine: Negative for polyuria.   Genitourinary:  Negative for difficulty urinating, dysuria, frequency and urgency.   Neurological:  Negative for dizziness, syncope and light-headedness.   Psychiatric/Behavioral:  Negative for dysphoric mood.        Past Medical History:   Diagnosis Date    Diabetes mellitus (HCC)     Hypertension      Past Surgical History:   Procedure Laterality Date    CARDIAC SURGERY       Family History   Problem Relation Age of Onset    Diabetes Mother     Heart attack Mother     Stroke Father     Hypertension Father      Social History     Socioeconomic History    Marital status: /Civil Union     Spouse name: None    Number of children: None    Years of education: None    Highest education level: None   Occupational History    None   Tobacco Use    Smoking status: Former     Types: Cigarettes    Smokeless tobacco: Never   Vaping Use    Vaping status: Never Used   Substance and Sexual Activity    Alcohol use: Yes     Comment: sparingly    Drug use: No    Sexual activity: None   Other Topics Concern    None   Social History Narrative    None     Social Determinants of Health     Financial Resource Strain: Low Risk  (4/27/2023)    Overall Financial Resource Strain (CARDIA)     Difficulty of Paying Living Expenses: Not very hard   Food Insecurity: Not on file   Transportation Needs: No Transportation Needs (4/27/2023)    PRAPARE - Transportation     Lack of Transportation (Medical): No     Lack of Transportation (Non-Medical): No   Physical Activity: Not on file   Stress: Not on file   Social Connections: Not on file   Intimate Partner Violence: Not on file   Housing Stability: Not on file     Current Outpatient Medications on File Prior to Visit   Medication Sig    aspirin (ECOTRIN LOW STRENGTH) 81 mg EC tablet Take 1 tablet by mouth daily    b complex vitamins capsule Take 1 capsule by mouth daily    carbidopa-levodopa (SINEMET)  mg per  "tablet TAKE 1 & 1/2 (ONE & ONE-HALF) TABLETS BY MOUTH THREE TIMES DAILY    cholecalciferol (VITAMIN D3) 1,000 units tablet Take 1,000 Units by mouth daily    Coenzyme Q10 (COQ10) 100 MG CAPS Take by mouth daily    insulin detemir (LEVEMIR) 100 units/mL subcutaneous injection Inject under the skin daily at bedtime 15units Qam and 18units QHS    lisinopril (ZESTRIL) 10 mg tablet Take 10 mg by mouth 2 (two) times a day    Multiple Vitamin (MULTIVITAMIN) tablet Take 1 tablet by mouth daily    Omega-3 Fatty Acids (FISH OIL) 1,000 mg Take 1,000 mg by mouth every other day      ONE TOUCH ULTRA TEST test strip     simvastatin (ZOCOR) 40 mg tablet Take 40 mg by mouth daily at bedtime      albuterol (PROVENTIL HFA,VENTOLIN HFA) 90 mcg/act inhaler Inhale 2 puffs every 6 (six) hours as needed for wheezing or shortness of breath (Patient not taking: Reported on 2/9/2024)    benzonatate (TESSALON PERLES) 100 mg capsule Take 1 capsule (100 mg total) by mouth 3 (three) times a day as needed for cough (Patient not taking: Reported on 2/9/2024)    multivitamin (THERAGRAN) TABS Take 1 tablet by mouth daily (Patient not taking: Reported on 8/29/2023)     Allergies   Allergen Reactions    Metformin GI Intolerance     Immunization History   Administered Date(s) Administered    COVID-19 Moderna mRNA Vaccine 12 Yr+ 50 mcg/0.5 mL (Spikevax) 10/17/2023    COVID-19 PFIZER VACCINE 0.3 ML IM 01/21/2021, 02/09/2021, 10/30/2021    COVID-19 Pfizer Vac BIVALENT Dipesh-sucrose 12 Yr+ IM 09/29/2022    Influenza, high dose seasonal 0.7 mL 10/31/2019, 09/16/2020, 11/01/2022       Objective     /64   Pulse 59   Ht 5' 9.5\" (1.765 m)   Wt 78 kg (172 lb)   SpO2 95%   BMI 25.04 kg/m²     Physical Exam  Constitutional:       Appearance: Normal appearance.   HENT:      Head: Normocephalic and atraumatic.   Eyes:      Conjunctiva/sclera: Conjunctivae normal.   Cardiovascular:      Rate and Rhythm: Normal rate and regular rhythm.      Heart sounds: " Normal heart sounds.   Pulmonary:      Effort: Pulmonary effort is normal.      Breath sounds: Normal breath sounds.   Abdominal:      General: There is no distension.   Musculoskeletal:         General: Normal range of motion.      Cervical back: Normal range of motion and neck supple.   Neurological:      Mental Status: He is alert and oriented to person, place, and time.   Psychiatric:         Behavior: Behavior normal.         Thought Content: Thought content normal.       Leena Frost MD

## 2024-04-11 NOTE — ASSESSMENT & PLAN NOTE
Lab Results   Component Value Date    HGBA1C 7.6 (H) 04/04/2024     HA1C trending up from 6.9 to 7.6. Goal A1C <8, remains within goal  Currently managed on Levemir 15 units am and 18 units PM.   Recently switched brand of insulin and reports improved fasting BG levels.   Following with endocrinology, will continue to monitor.

## 2024-04-11 NOTE — ASSESSMENT & PLAN NOTE
BP Readings from Last 3 Encounters:   04/11/24 138/64   02/09/24 138/78   01/02/24 124/72     Within goal of <140/90  Continue current regimen

## 2024-04-12 NOTE — ASSESSMENT & PLAN NOTE
Lipid panel reviewed and discussed  Controlled on Zocor 40 mg QD  Discussed lifestyle modifications  Will continue to monitor

## 2024-05-07 ENCOUNTER — APPOINTMENT (OUTPATIENT)
Dept: LAB | Facility: CLINIC | Age: 89
End: 2024-05-07
Payer: COMMERCIAL

## 2024-05-07 LAB — PSA SERPL-MCNC: 14.46 NG/ML (ref 0–4)

## 2024-05-07 PROCEDURE — 84153 ASSAY OF PSA TOTAL: CPT

## 2024-05-28 ENCOUNTER — HOSPITAL ENCOUNTER (OUTPATIENT)
Dept: RADIOLOGY | Age: 89
Discharge: HOME | End: 2024-05-28

## 2024-05-28 DIAGNOSIS — R97.20 ELEVATED PROSTATE SPECIFIC ANTIGEN (PSA): ICD-10-CM

## 2024-07-24 ENCOUNTER — HOSPITAL ENCOUNTER (OUTPATIENT)
Dept: RADIOLOGY | Age: 89
Discharge: HOME | End: 2024-07-24
Payer: COMMERCIAL

## 2024-07-24 PROCEDURE — A9585 GADOBUTROL INJECTION: HCPCS | Performed by: UROLOGY

## 2024-07-24 PROCEDURE — 72197 MRI PELVIS W/O & W/DYE: CPT

## 2024-07-24 PROCEDURE — 76377 3D RENDER W/INTRP POSTPROCES: CPT

## 2024-07-24 RX ORDER — GADOBUTROL 604.72 MG/ML
8 INJECTION INTRAVENOUS
Status: COMPLETED | OUTPATIENT
Start: 2024-07-24 | End: 2024-07-24

## 2024-07-24 RX ADMIN — GADOBUTROL 8 ML: 604.72 INJECTION INTRAVENOUS at 08:58

## 2024-08-05 DIAGNOSIS — G20.A1 PARKINSON'S DISEASE: ICD-10-CM

## 2024-08-27 ENCOUNTER — OFFICE VISIT (OUTPATIENT)
Dept: NEUROLOGY | Facility: CLINIC | Age: 89
End: 2024-08-27
Payer: COMMERCIAL

## 2024-08-27 VITALS
BODY MASS INDEX: 24.89 KG/M2 | OXYGEN SATURATION: 98 % | HEART RATE: 56 BPM | SYSTOLIC BLOOD PRESSURE: 128 MMHG | DIASTOLIC BLOOD PRESSURE: 84 MMHG | WEIGHT: 171 LBS | TEMPERATURE: 97.9 F

## 2024-08-27 DIAGNOSIS — G20.A1 PARKINSON'S DISEASE: Primary | ICD-10-CM

## 2024-08-27 PROCEDURE — 99213 OFFICE O/P EST LOW 20 MIN: CPT | Performed by: PHYSICIAN ASSISTANT

## 2024-08-27 PROCEDURE — G2211 COMPLEX E/M VISIT ADD ON: HCPCS | Performed by: PHYSICIAN ASSISTANT

## 2024-08-27 NOTE — ASSESSMENT & PLAN NOTE
Patient continues to do very well from a Parkinson's standpoint.  Tremors remain well-controlled.  He denies any on or off with the medication.  He feels he is functioning well.  No recent falls.  He continues to stay active with working outside as well as bowling 3 times a week.  Overall his exam is stable.  He was noted to have some mild to moderate bradykinesia however this does not interfere with any daily functions.  At this time we will have her remain on his current dosing of medication.  We could certainly consider increasing the dose further as needed for symptom control.

## 2024-08-27 NOTE — PROGRESS NOTES
Review of Systems   Constitutional: Negative.    HENT: Negative.     Eyes: Negative.    Respiratory: Negative.     Cardiovascular: Negative.    Gastrointestinal: Negative.    Endocrine: Negative.    Genitourinary: Negative.    Musculoskeletal: Negative.    Neurological: Negative.    Psychiatric/Behavioral: Negative.

## 2024-08-27 NOTE — PROGRESS NOTES
Patient ID: Trent Tam Jr. is a 89 y.o. male.    Assessment/Plan:    Parkinson's disease (HCC)  Patient continues to do very well from a Parkinson's standpoint.  Tremors remain well-controlled.  He denies any on or off with the medication.  He feels he is functioning well.  No recent falls.  He continues to stay active with working outside as well as bowling 3 times a week.  Overall his exam is stable.  He was noted to have some mild to moderate bradykinesia however this does not interfere with any daily functions.  At this time we will have her remain on his current dosing of medication.  We could certainly consider increasing the dose further as needed for symptom control.      Subjective:    Trent Tam Jr. is an 89 year old RH male with CAD, HTN, DM, PVD, HLD and Parkinson's disease who presents for follow up.  To review, bilateral hand tremors present since around 2018 with significant improvement per his wife with the initiation of carbidopa/levodopa. On initial presentation he had minimal parkinsonism with rare right-sided myoclonus of the arm and leg.      At his last visit he was doing well with Sinemet and no changes were made.        INTERVAL HISTORY:  Doing well   Tremors remain controlled   He can still perform all ADLs on his own   Continues to bowl regularly   No falls  Wakes 2-3 times a night to use the bathroom   No issues with swallowing   He feels memory is still very good   No hallucinations   No clear on or off with the medication     Current medications and timing:  Sinemet 25/100 1.5 tabs TID @ 9:30 3:30 9:30 - increasing doses has helped with tremor control      I personally reviewed and updated the ROS.       Objective:    Blood pressure 128/84, pulse 56, temperature 97.9 °F (36.6 °C), temperature source Temporal, weight 77.6 kg (171 lb), SpO2 98%.    Physical Exam  Constitutional:       Appearance: Normal appearance.   HENT:      Right Ear: Hearing normal.      Left Ear: Hearing  normal.   Eyes:      General: Lids are normal.      Extraocular Movements: Extraocular movements intact.      Pupils: Pupils are equal, round, and reactive to light.   Pulmonary:      Effort: Pulmonary effort is normal.   Neurological:      Mental Status: He is alert.      Motor: Motor strength is normal.  Psychiatric:         Speech: Speech normal.         Neurological Exam  Mental Status  Alert. Oriented to person, place and time. Speech is normal.    Cranial Nerves  CN III, IV, VI: Extraocular movements intact bilaterally. Normal lids and orbits bilaterally. Pupils equal round and reactive to light bilaterally.  CN V:  Right: Facial sensation is normal.  Left: Facial sensation is normal on the left.  CN VIII:  Right: Hearing is normal.  Left: Hearing is normal.  CN XI: Shoulder shrug strength is normal.  CN XII: Tongue midline without atrophy or fasciculations.    Motor   Strength is 5/5 throughout all four extremities.    Sensory  Light touch is normal in upper and lower extremities.     Reflexes  Glabellar tap present.    Coordination  Right: Finger-to-nose abnormality:Left: Finger-to-nose abnormality:    Gait    Arose without issues.  Slightly decreased stride, slightly wider based gait. Stooped posture. .     UPDRS motor:                              Time since last dose:  2/9/24 8/27/24   Speech  1 1   Facial Expression  1 1   Rigidity - Neck        Rigidity - Upper Extremity (Right)  0 0   Rigidity - Upper Extremity (Left)   0 0   Rigidity - Lower Extremity (Right)  0 0   Rigidity - Lower Extremity (Left)   0 0   Finger Taps (Right)   1 1   Finger Taps (Left)   2 2   Hand Movement (Right)  2 1   Hand Movement (Left)   2 1   Pronation/Supination (Right)  1 2   Pronation/Supination (Left)   1 1   Toe Tapping (Right) 1 1   Toe Tapping (Left) 1 0   Leg Agility (Right)  0 0   Leg Agility (Left)   0 0   Arising from Chair   0 0   Gait   1 1   Freezing of Gait 0 0   Postural Stability         Posture 1 1   Global  spontaneity of movement 1 1   Postural Tremor (Right) 0 0   Postural Tremor (Left) 0 0   Kinetic Tremor (Right)  0 0   Kinetic Tremor (Left)  0 0   Rest tremor amplitude RUE 0 0   Rest tremor amplitude LUE 0 0   Rest tremor amplitude RLE 0 0   Reset tremor amplitude LLE 0 0   Lip/Jaw Tremor  0 0   Consistency of tremor 0 0   Motor Exam Total:            ROS:    Review of Systems

## 2024-09-05 ENCOUNTER — OFFICE VISIT (OUTPATIENT)
Age: 89
End: 2024-09-05
Payer: COMMERCIAL

## 2024-09-05 VITALS
RESPIRATION RATE: 16 BRPM | TEMPERATURE: 97.4 F | BODY MASS INDEX: 24.48 KG/M2 | HEIGHT: 70 IN | SYSTOLIC BLOOD PRESSURE: 128 MMHG | WEIGHT: 171 LBS | DIASTOLIC BLOOD PRESSURE: 78 MMHG | OXYGEN SATURATION: 97 % | HEART RATE: 47 BPM

## 2024-09-05 DIAGNOSIS — I10 BENIGN ESSENTIAL HYPERTENSION: ICD-10-CM

## 2024-09-05 DIAGNOSIS — I25.10 ARTERIOSCLEROTIC CARDIOVASCULAR DISEASE: ICD-10-CM

## 2024-09-05 DIAGNOSIS — E11.51 TYPE 2 DIABETES MELLITUS WITH DIABETIC PERIPHERAL ANGIOPATHY WITHOUT GANGRENE, WITH LONG-TERM CURRENT USE OF INSULIN (HCC): ICD-10-CM

## 2024-09-05 DIAGNOSIS — Z00.00 MEDICARE ANNUAL WELLNESS VISIT, SUBSEQUENT: Primary | ICD-10-CM

## 2024-09-05 DIAGNOSIS — E78.00 PURE HYPERCHOLESTEROLEMIA: ICD-10-CM

## 2024-09-05 DIAGNOSIS — N40.0 ENLARGED PROSTATE WITHOUT LOWER URINARY TRACT SYMPTOMS (LUTS): ICD-10-CM

## 2024-09-05 DIAGNOSIS — Z79.4 TYPE 2 DIABETES MELLITUS WITH DIABETIC PERIPHERAL ANGIOPATHY WITHOUT GANGRENE, WITH LONG-TERM CURRENT USE OF INSULIN (HCC): ICD-10-CM

## 2024-09-05 DIAGNOSIS — G20.B2 PARKINSON'S DISEASE WITH DYSKINESIA AND FLUCTUATING MANIFESTATIONS: ICD-10-CM

## 2024-09-05 PROCEDURE — 99214 OFFICE O/P EST MOD 30 MIN: CPT | Performed by: INTERNAL MEDICINE

## 2024-09-05 PROCEDURE — G0439 PPPS, SUBSEQ VISIT: HCPCS | Performed by: INTERNAL MEDICINE

## 2024-09-05 NOTE — ASSESSMENT & PLAN NOTE
Patient continues on medication as per his neurologist.  States he is functional continues to stay physically active and is continuing to bowl.

## 2024-09-05 NOTE — PATIENT INSTRUCTIONS
Medicare Preventive Visit Patient Instructions  Thank you for completing your Welcome to Medicare Visit or Medicare Annual Wellness Visit today. Your next wellness visit will be due in one year (9/6/2025).  The screening/preventive services that you may require over the next 5-10 years are detailed below. Some tests may not apply to you based off risk factors and/or age. Screening tests ordered at today's visit but not completed yet may show as past due. Also, please note that scanned in results may not display below.  Preventive Screenings:  Service Recommendations Previous Testing/Comments   Colorectal Cancer Screening  Colonoscopy    Fecal Occult Blood Test (FOBT)/Fecal Immunochemical Test (FIT)  Fecal DNA/Cologuard Test  Flexible Sigmoidoscopy Age: 45-75 years old   Colonoscopy: every 10 years (May be performed more frequently if at higher risk)  OR  FOBT/FIT: every 1 year  OR  Cologuard: every 3 years  OR  Sigmoidoscopy: every 5 years  Screening may be recommended earlier than age 45 if at higher risk for colorectal cancer. Also, an individualized decision between you and your healthcare provider will decide whether screening between the ages of 76-85 would be appropriate. Colonoscopy: Not on file  FOBT/FIT: Not on file  Cologuard: Not on file  Sigmoidoscopy: Not on file    Screening Not Indicated     Prostate Cancer Screening Individualized decision between patient and health care provider in men between ages of 55-69   Medicare will cover every 12 months beginning on the day after your 50th birthday PSA: 14.46 ng/mL     Screening Not Indicated     Hepatitis C Screening Once for adults born between 1945 and 1965  More frequently in patients at high risk for Hepatitis C Hep C Antibody: Not on file        Diabetes Screening 1-2 times per year if you're at risk for diabetes or have pre-diabetes Fasting glucose: 111 mg/dL (4/4/2024)  A1C: 7.6 % (4/4/2024)  Screening Not Indicated  History Diabetes   Cholesterol  Screening Once every 5 years if you don't have a lipid disorder. May order more often based on risk factors. Lipid panel: 08/24/2023  Screening Not Indicated  History Lipid Disorder      Other Preventive Screenings Covered by Medicare:  Abdominal Aortic Aneurysm (AAA) Screening: covered once if your at risk. You're considered to be at risk if you have a family history of AAA or a male between the age of 65-75 who smoking at least 100 cigarettes in your lifetime.  Lung Cancer Screening: covers low dose CT scan once per year if you meet all of the following conditions: (1) Age 55-77; (2) No signs or symptoms of lung cancer; (3) Current smoker or have quit smoking within the last 15 years; (4) You have a tobacco smoking history of at least 20 pack years (packs per day x number of years you smoked); (5) You get a written order from a healthcare provider.  Glaucoma Screening: covered annually if you're considered high risk: (1) You have diabetes OR (2) Family history of glaucoma OR (3)  aged 50 and older OR (4)  American aged 65 and older  Osteoporosis Screening: covered every 2 years if you meet one of the following conditions: (1) Have a vertebral abnormality; (2) On glucocorticoid therapy for more than 3 months; (3) Have primary hyperparathyroidism; (4) On osteoporosis medications and need to assess response to drug therapy.  HIV Screening: covered annually if you're between the age of 15-65. Also covered annually if you are younger than 15 and older than 65 with risk factors for HIV infection. For pregnant patients, it is covered up to 3 times per pregnancy.    Immunizations:  Immunization Recommendations   Influenza Vaccine Annual influenza vaccination during flu season is recommended for all persons aged >= 6 months who do not have contraindications   Pneumococcal Vaccine   * Pneumococcal conjugate vaccine = PCV13 (Prevnar 13), PCV15 (Vaxneuvance), PCV20 (Prevnar 20)  * Pneumococcal  polysaccharide vaccine = PPSV23 (Pneumovax) Adults 19-65 yo with certain risk factors or if 65+ yo  If never received any pneumonia vaccine: recommend Prevnar 20 (PCV20)  Give PCV20 if previously received 1 dose of PCV13 or PPSV23   Hepatitis B Vaccine 3 dose series if at intermediate or high risk (ex: diabetes, end stage renal disease, liver disease)   Respiratory syncytial virus (RSV) Vaccine - COVERED BY MEDICARE PART D  * RSVPreF3 (Arexvy) CDC recommends that adults 60 years of age and older may receive a single dose of RSV vaccine using shared clinical decision-making (SCDM)   Tetanus (Td) Vaccine - COST NOT COVERED BY MEDICARE PART B Following completion of primary series, a booster dose should be given every 10 years to maintain immunity against tetanus. Td may also be given as tetanus wound prophylaxis.   Tdap Vaccine - COST NOT COVERED BY MEDICARE PART B Recommended at least once for all adults. For pregnant patients, recommended with each pregnancy.   Shingles Vaccine (Shingrix) - COST NOT COVERED BY MEDICARE PART B  2 shot series recommended in those 19 years and older who have or will have weakened immune systems or those 50 years and older     Health Maintenance Due:  There are no preventive care reminders to display for this patient.  Immunizations Due:      Topic Date Due   • Pneumococcal Vaccine: 65+ Years (1 of 2 - PCV) Never done   • Influenza Vaccine (1) 09/01/2024     Advance Directives   What are advance directives?  Advance directives are legal documents that state your wishes and plans for medical care. These plans are made ahead of time in case you lose your ability to make decisions for yourself. Advance directives can apply to any medical decision, such as the treatments you want, and if you want to donate organs.   What are the types of advance directives?  There are many types of advance directives, and each state has rules about how to use them. You may choose a combination of any of the  following:  Living will:  This is a written record of the treatment you want. You can also choose which treatments you do not want, which to limit, and which to stop at a certain time. This includes surgery, medicine, IV fluid, and tube feedings.   Durable power of  for healthcare (DPAHC):  This is a written record that states who you want to make healthcare choices for you when you are unable to make them for yourself. This person, called a proxy, is usually a family member or a friend. You may choose more than 1 proxy.  Do not resuscitate (DNR) order:  A DNR order is used in case your heart stops beating or you stop breathing. It is a request not to have certain forms of treatment, such as CPR. A DNR order may be included in other types of advance directives.  Medical directive:  This covers the care that you want if you are in a coma, near death, or unable to make decisions for yourself. You can list the treatments you want for each condition. Treatment may include pain medicine, surgery, blood transfusions, dialysis, IV or tube feedings, and a ventilator (breathing machine).  Values history:  This document has questions about your views, beliefs, and how you feel and think about life. This information can help others choose the care that you would choose.  Why are advance directives important?  An advance directive helps you control your care. Although spoken wishes may be used, it is better to have your wishes written down. Spoken wishes can be misunderstood, or not followed. Treatments may be given even if you do not want them. An advance directive may make it easier for your family to make difficult choices about your care.       © Copyright [employer] 2018 Information is for End User's use only and may not be sold, redistributed or otherwise used for commercial purposes. All illustrations and images included in CareNotes® are the copyrighted property of [employer].D.A[employer]M., Inc[employer] or [employer]

## 2024-09-05 NOTE — ASSESSMENT & PLAN NOTE
Patient is an 89-year-old male with a history of extensive medical problems outlined previously who is here today for repeat Medicare wellness visit.  Patient did have extensive lab testing, complete lab testing performed and April of this year and also will have extensive testing performed again in October.  Patient states in general doing well has been helping his wife who was recently hospitalized with an exacerbation of her transverse myelitis.  Patient continues to follow-up with his cardiologist and also with his endocrinologist.

## 2024-09-05 NOTE — ASSESSMENT & PLAN NOTE
Patient does have a history of elevated PSA and BPH.  His PSA is now up to 14 and we did review recent MRI of the prostate and pelvis showing no extension of disease and he does have a follow-up appointment to be seen by urology discussed the results of the MRI and whether further treatment is indicated.

## 2024-09-05 NOTE — ASSESSMENT & PLAN NOTE
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  Patient has continued to follow-up with his cardiologist but his cardiologist has recently retired and he will be seen by a new cardiologist with [facility].  Is up-to-date with all cardiac testing

## 2024-09-05 NOTE — PROGRESS NOTES
Ambulatory Visit  Name: Trent Tam Jr.      : 1935      MRN: [id number]  Encounter Provider: Jaswant Hua DO  Encounter Date: 2024   Encounter department: [facility] INTER[facility]ICINE    Assessment & Plan   1. Medicare annual wellness visit, subsequent  2. Parkinson's disease with dyskinesia and fluctuating manifestations       Preventive health issues were discussed with patient, and age appropriate screening tests were ordered as noted in patient's After Visit Summary. Personalized health advice and appropriate referrals for health education or preventive services given if needed, as noted in patient's After Visit Summary.    History of Present Illness     HPI   Patient Care Team:  Jaswant Hua DO as PCP - General (Internal Medicine)  Jaswant Hua DO as PCP - PCP-[facility] Attributed-Roster    Review of Systems  Medical History Reviewed by provider this encounter:       Annual Wellness Visit Questionnaire   Trent is here for his Subsequent Wellness visit.     Health Risk Assessment:   Patient rates overall health as good. Patient feels that their physical health rating is same. Patient is satisfied with their life. Eyesight was rated as same. Hearing was rated as same. Patient feels that their emotional and mental health rating is same. Patients states they are never, rarely angry. Patient states they are sometimes unusually tired/fatigued. Pain experienced in the last 7 days has been some. Patient's pain rating has been 8/10. Patient states that he has experienced no weight loss or gain in last 6 months.     Depression Screening:   PHQ-2 Score: 0  PHQ-9 Score: 0      Fall Risk Screening:   In the past year, patient has experienced: no history of falling in past year      Home Safety:  Patient does not have trouble with stairs inside or outside of their home. Patient has working smoke alarms and has no working carbon monoxide detector. Home safety hazards  include: none.     Nutrition:   Current diet is Diabetic.     Medications:   Patient is currently taking over-the-counter supplements. OTC medications include: see medication list. Patient is able to manage medications.     Activities of Daily Living (ADLs)/Instrumental Activities of Daily Living (IADLs):   Walk and transfer into and out of bed and chair?: Yes  Dress and groom yourself?: Yes    Bathe or shower yourself?: Yes    Feed yourself? Yes  Do your laundry/housekeeping?: Yes  Manage your money, pay your bills and track your expenses?: Yes  Make your own meals?: Yes    Do your own shopping?: Yes    Previous Hospitalizations:   Any hospitalizations or ED visits within the last 12 months?: No      Advance Care Planning:   Living will: Yes    Durable POA for healthcare: Yes    Advanced directive: Yes      PREVENTIVE SCREENINGS      Cardiovascular Screening:    General: Screening Not Indicated and History Lipid Disorder      Diabetes Screening:     General: Screening Not Indicated and History Diabetes      Colorectal Cancer Screening:     General: Screening Not Indicated      Prostate Cancer Screening:    General: Screening Not Indicated      Abdominal Aortic Aneurysm (AAA) Screening:    Risk factors include: tobacco use        Lung Cancer Screening:     General: Screening Not Indicated    Screening, Brief Intervention, and Referral to Treatment (SBIRT)    Screening  Typical number of drinks in a day: 0  Typical number of drinks in a week: 0  Interpretation: Low risk drinking behavior.    Single Item Drug Screening:  How often have you used an illegal drug (including marijuana) or a prescription medication for non-medical reasons in the past year? never    Single Item Drug Screen Score: 0  Interpretation: Negative screen for possible drug use disorder    Social Determinants of Health     Financial Resource Strain: Low Risk  (4/27/2023)    Overall Financial Resource Strain (CARDIA)     Difficulty of Paying Living  "Expenses: Not very hard   Transportation Needs: No Transportation Needs (4/27/2023)    PRAPARE - Transportation     Lack of Transportation (Medical): No     Lack of Transportation (Non-Medical): No     No results found.    Objective     /78   Pulse (!) 47   Temp (!) 97.4 °F (36.3 °C)   Resp 16   Ht 5' 9.5\" (1.765 m)   Wt 77.6 kg (171 lb)   SpO2 97%   BMI 24.89 kg/m²     Physical Exam      "

## 2024-09-05 NOTE — ASSESSMENT & PLAN NOTE
Patient's blood pressure is showing adequate control with present treatment.  Renal function is stable.  Will continue present medication surveillance.

## 2024-09-05 NOTE — PROGRESS NOTES
Ambulatory Visit  Name: Trent Tam Jr.      : 1935      MRN: [id number]  Encounter Provider: Jaswant Hua DO  Encounter Date: 2024   Encounter department: [facility] INTERNAL MEDICINE    Assessment & Plan   1. Medicare annual wellness visit, subsequent  Assessment & Plan:  Patient is an 89-year-old male with a history of extensive medical problems outlined previously who is here today for repeat Medicare wellness visit.  Patient did have extensive lab testing, complete lab testing performed and April of this year and also will have extensive testing performed again in October.  Patient states in general doing well has been helping his wife who was recently hospitalized with an exacerbation of her transverse myelitis.  Patient continues to follow-up with his cardiologist and also with his endocrinologist.  2. Parkinson's disease with dyskinesia and fluctuating manifestations  Assessment & Plan:  Patient continues on medication as per his neurologist.  States he is functional continues to stay physically active and is continuing to bowl.  3. Type 2 diabetes mellitus with diabetic peripheral 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, with long-term current use of insulin ([facility])  Assessment & Plan:  Longstanding history of diabetes mellitus type 2 and is insulin-dependent.  He continues to follow-up with his endocrinologist who makes adjustments with treatment as indicated.  Patient is extremely careful with his diet trying to eliminate as much as possible concentrated sweets and simple carbohydrates.  His weight is stable.  Will have repeat lab testing performed and follow-up with his endocrinologist in October.  He is up-to-date with routine eye exams and foot exam seeing a podiatrist every 10 weeks  Lab Results   Component Value Date    HGBA1C 7.6 (H) 2024     4. Pure hypercholesterolemia  Assessment & Plan:  Patient has a history of hyperlipidemia and coronary artery disease, diabetes  mellitus type 2.  Patient remains on statin and he states he does watch his dietary intake of fats and cholesterol but he admits not always perfect.  He will continue to have his cholesterol profile performed and we will make adjustments to medication along with his cardiologist in the future if needed  5. Enlarged prostate without lower urinary tract symptoms (luts)  Assessment & Plan:  Patient does have a history of elevated PSA and BPH.  His PSA is now up to 14 and we did review recent MRI of the prostate and pelvis showing no extension of disease and he does have a follow-up appointment to be seen by urology discussed the results of the MRI and whether further treatment is indicated.  6. Benign essential hypertension  Assessment & Plan:  Patient's blood pressure is showing adequate control with present treatment.  Renal function is stable.  Will continue present medication surveillance.  7. 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  Patient has continued to follow-up with his cardiologist but his cardiologist has recently retired and he will be seen by a new cardiologist with [facility].  Is up-to-date with all cardiac testing         History of Present Illness     Patient is an 89-year-old male with a history of extensive medical problems outlined previously who is here today for repeat Medicare wellness visit.  Patient states in general doing relatively well.  Admits that recently he has been busy taking care of his wife who recently was hospitalized with an exacerbation of her transverse myelitis.  He also relates that his previous cardiologist is now retiring and has yet to see a new cardiologist with [facility].      Review of Systems   Constitutional: Negative.    HENT: Negative.     Eyes: Negative.    Respiratory: Negative.     Cardiovascular: Negative.    Gastrointestinal: Negative.     Endocrine: Negative.    Genitourinary: Negative.    Musculoskeletal: Negative.    Skin: Negative.    Allergic/Immunologic: Negative.    Neurological: Negative.    Hematological: Negative.    Psychiatric/Behavioral: Negative.       Past Medical History:   Diagnosis Date    Diabetes mellitus (HCC)     Hypertension      Past Surgical History:   Procedure Laterality Date    CARDIAC SURGERY       Family History   Problem Relation Age of Onset    Diabetes Mother     Heart attack Mother     Stroke Father     Hypertension Father      Social History     Tobacco Use    Smoking status: Former     Types: Cigarettes    Smokeless tobacco: Never   Vaping Use    Vaping status: Never Used   Substance and Sexual Activity    Alcohol use: Yes     Comment: sparingly    Drug use: No    Sexual activity: Not on file     Current Outpatient Medications on File Prior to Visit   Medication Sig    aspirin (ECOTRIN LOW STRENGTH) 81 mg EC tablet Take 1 tablet by mouth daily    b complex vitamins capsule Take 1 capsule by mouth daily    carbidopa-levodopa (SINEMET)  mg per tablet TAKE 1 & 1/2 (ONE & ONE-HALF) TABLETS BY MOUTH THREE TIMES DAILY    cholecalciferol (VITAMIN D3) 1,000 units tablet Take 1,000 Units by mouth daily    Coenzyme Q10 (COQ10) 100 MG CAPS Take by mouth daily    insulin detemir (LEVEMIR) 100 units/mL subcutaneous injection Inject under the skin daily at bedtime 15units Qam and 18units QHS    lisinopril (ZESTRIL) 10 mg tablet Take 10 mg by mouth 2 (two) times a day    Multiple Vitamin (MULTIVITAMIN) tablet Take 1 tablet by mouth daily    Omega-3 Fatty Acids (FISH OIL) 1,000 mg Take 1,000 mg by mouth every other day      ONE TOUCH ULTRA TEST test strip     simvastatin (ZOCOR) 40 mg tablet Take 40 mg by mouth daily at bedtime      albuterol (PROVENTIL HFA,VENTOLIN HFA) 90 mcg/act inhaler Inhale 2 puffs every 6 (six) hours as needed for wheezing or shortness of breath (Patient not taking: Reported on 2/9/2024)     "benzonatate (TESSALON PERLES) 100 mg capsule Take 1 capsule (100 mg total) by mouth 3 (three) times a day as needed for cough (Patient not taking: Reported on 9/5/2024)    multivitamin (THERAGRAN) TABS Take 1 tablet by mouth daily (Patient not taking: Reported on 8/29/2023)     Allergies   Allergen Reactions    Metformin GI Intolerance     Immunization History   Administered Date(s) Administered    COVID-19 Moderna mRNA Vaccine 12 Yr+ 50 mcg/0.5 mL (Spikevax) 10/17/2023    COVID-19 PFIZER VACCINE 0.3 ML IM 01/21/2021, 02/09/2021, 10/30/2021    COVID-19 Pfizer Vac BIVALENT Dipesh-sucrose 12 Yr+ IM 09/29/2022    Influenza, high dose seasonal 0.7 mL 10/31/2019, 09/16/2020, 11/01/2022     Objective     /78   Pulse (!) 47   Temp (!) 97.4 °F (36.3 °C)   Resp 16   Ht 5' 9.5\" (1.765 m)   Wt 77.6 kg (171 lb)   SpO2 97%   BMI 24.89 kg/m²     Physical Exam  Vitals and nursing note reviewed.   Constitutional:       General: He is not in acute distress.     Appearance: Normal appearance. He is normal weight. He is not ill-appearing, toxic-appearing or diaphoretic.      Comments: Pleasant, articulate 89-year-old male who is awake alert in no acute distress oriented x 3 accompanied by his wife   HENT:      Head: Normocephalic.      Right Ear: Tympanic membrane, ear canal and external ear normal. There is no impacted cerumen.      Left Ear: Tympanic membrane, ear canal and external ear normal. There is no impacted cerumen.      Nose: Nose normal. No congestion or rhinorrhea.      Mouth/Throat:      Mouth: Mucous membranes are moist.      Pharynx: Oropharynx is clear. No oropharyngeal exudate or posterior oropharyngeal erythema.   Eyes:      General: No scleral icterus.        Right eye: No discharge.         Left eye: No discharge.      Extraocular Movements: Extraocular movements intact.      Conjunctiva/sclera: Conjunctivae normal.      Pupils: Pupils are equal, round, and reactive to light.   Neck:      Vascular: No " carotid bruit.   Cardiovascular:      Rate and Rhythm: Regular rhythm. Bradycardia present.      Heart sounds: Murmur heard.      No friction rub. No gallop.   Pulmonary:      Effort: Pulmonary effort is normal. No respiratory distress.      Breath sounds: Normal breath sounds. No stridor. No wheezing, rhonchi or rales.   Chest:      Chest wall: No tenderness.   Abdominal:      General: Abdomen is flat. Bowel sounds are normal. There is no distension.      Palpations: Abdomen is soft. There is no mass.      Tenderness: There is no abdominal tenderness. There is no right CVA tenderness, left CVA tenderness, guarding or rebound.      Hernia: No hernia is present.   Musculoskeletal:         General: Deformity present. No swelling, tenderness or signs of injury. Normal range of motion.      Cervical back: Normal range of motion and neck supple. No rigidity or tenderness.      Right lower leg: No edema.      Left lower leg: No edema.      Comments: Some diffuse arthritic changes but nothing acute, flattening to the cervical thoracic and lumbar spine.  Some arthritic changes hands and digits bilaterally.  Slightly bent over gait with ambulation   Lymphadenopathy:      Cervical: No cervical adenopathy.   Skin:     General: Skin is warm and dry.      Coloration: Skin is not jaundiced or pale.      Findings: No bruising, erythema, lesion or rash.   Neurological:      Mental Status: He is alert and oriented to person, place, and time. Mental status is at baseline.      Cranial Nerves: No cranial nerve deficit.      Sensory: No sensory deficit.      Motor: No weakness.      Coordination: Coordination abnormal.      Gait: Gait abnormal.      Deep Tendon Reflexes: Reflexes normal.   Psychiatric:         Mood and Affect: Mood normal.         Thought Content: Thought content normal.         Judgment: Judgment normal.

## 2024-09-05 NOTE — ASSESSMENT & PLAN NOTE
Patient has a history of hyperlipidemia and coronary artery disease, diabetes mellitus type 2.  Patient remains on statin and he states he does watch his dietary intake of fats and cholesterol but he admits not always perfect.  He will continue to have his cholesterol profile performed and we will make adjustments to medication along with his cardiologist in the future if needed

## 2024-09-05 NOTE — ASSESSMENT & PLAN NOTE
Longstanding history of diabetes mellitus type 2 and is insulin-dependent.  He continues to follow-up with his endocrinologist who makes adjustments with treatment as indicated.  Patient is extremely careful with his diet trying to eliminate as much as possible concentrated sweets and simple carbohydrates.  His weight is stable.  Will have repeat lab testing performed and follow-up with his endocrinologist in October.  He is up-to-date with routine eye exams and foot exam seeing a podiatrist every 10 weeks  Lab Results   Component Value Date    HGBA1C 7.6 (H) 04/04/2024

## 2024-09-20 ENCOUNTER — OFFICE VISIT (OUTPATIENT)
Age: 89
End: 2024-09-20
Payer: COMMERCIAL

## 2024-09-20 VITALS
OXYGEN SATURATION: 99 % | RESPIRATION RATE: 16 BRPM | DIASTOLIC BLOOD PRESSURE: 72 MMHG | HEART RATE: 53 BPM | SYSTOLIC BLOOD PRESSURE: 144 MMHG | BODY MASS INDEX: 25.05 KG/M2 | WEIGHT: 175 LBS | HEIGHT: 70 IN | TEMPERATURE: 97.4 F

## 2024-09-20 DIAGNOSIS — E11.51 TYPE 2 DIABETES MELLITUS WITH DIABETIC PERIPHERAL ANGIOPATHY WITHOUT GANGRENE, WITH LONG-TERM CURRENT USE OF INSULIN (HCC): ICD-10-CM

## 2024-09-20 DIAGNOSIS — Z79.4 TYPE 2 DIABETES MELLITUS WITH DIABETIC PERIPHERAL ANGIOPATHY WITHOUT GANGRENE, WITH LONG-TERM CURRENT USE OF INSULIN (HCC): ICD-10-CM

## 2024-09-20 DIAGNOSIS — R22.41 LOCALIZED SWELLING OF RIGHT FOOT: Primary | ICD-10-CM

## 2024-09-20 DIAGNOSIS — I73.9 PERIPHERAL VASCULAR DISEASE, UNSPECIFIED (HCC): ICD-10-CM

## 2024-09-20 PROCEDURE — G2211 COMPLEX E/M VISIT ADD ON: HCPCS | Performed by: INTERNAL MEDICINE

## 2024-09-20 PROCEDURE — 99213 OFFICE O/P EST LOW 20 MIN: CPT | Performed by: INTERNAL MEDICINE

## 2024-09-20 RX ORDER — CEPHALEXIN 500 MG/1
500 CAPSULE ORAL 3 TIMES DAILY
Qty: 30 CAPSULE | Refills: 0 | Status: SHIPPED | OUTPATIENT
Start: 2024-09-20 | End: 2024-09-30

## 2024-09-20 NOTE — ASSESSMENT & PLAN NOTE
Patient does have a history of peripheral vascular disease.  Pulses to his right lower extremity are intact.  Again the swelling to his right foot is nonpainful and the area is warm to the touch

## 2024-09-20 NOTE — ASSESSMENT & PLAN NOTE
Continues to follow-up closely with his endocrinologist and his diabetes has been under control, last hemoglobin A1c 7.6.  His endocrinologist continues to make adjustments with his insulin dose as needed.  Lab Results   Component Value Date    HGBA1C 7.6 (H) 04/04/2024

## 2024-09-20 NOTE — PROGRESS NOTES
Ambulatory Visit  Name: Trent Tam Jr.      : 1935      MRN: [id number]  Encounter Provider: Jaswant Hua DO  Encounter Date: 2024   Encounter department: [facility] INTERNAL MEDICINE    Assessment & Plan  Localized swelling of right foot  Patient is here today for acute evaluation.  States that he has been having continued swelling without pain to his right foot distally.  He has had no fever no chills or signs of infection and no accident or injury involved with his foot.  Again there is swelling and erythema is causing no discomfort.  On evaluation patient has significant swelling to the distal portion of his of right foot.  No pain to palpation.  The area is warm to the touch indicating positively a mild infection.  There is no open areas to the skin and patient continues to follow-up with his podiatrist with regular care with a history of diabetes.  With this not being painful we doubt this would be gouty arthritis and patient has no evidence of congestive heart failure on exam.  Thoughts are on this is a low-grade infection and patient was placed on Keflex 500 mg 3 times a day for 10 days.  To call on Monday with an update as to his condition and if not improving we will make sure we expedite an evaluation as soon as possible to be seen by his podiatrist.    Orders:    cephalexin (KEFLEX) 500 mg capsule; Take 1 capsule (500 mg total) by mouth 3 (three) times a day for 10 days    Type 2 diabetes mellitus with diabetic peripheral 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, with long-term current use of insulin ([facility])  Continues to follow-up closely with his endocrinologist and his diabetes has been under control, last hemoglobin A1c 7.6.  His endocrinologist continues to make adjustments with his insulin dose as needed.  Lab Results   Component Value Date    HGBA1C 7.6 (H) 2024            Peripheral vascular disease, unspecified (HCC)  Patient does have a history of peripheral vascular  disease.  Pulses to his right lower extremity are intact.  Again the swelling to his right foot is nonpainful and the area is warm to the touch              History of Present Illness     Patient is then 89-year-old male history of medical problems outlined previously who is here today for acute evaluation accompanied by his wife.  States that he has had swelling erythema to the distal portion of his right foot.  No history of accident or injury and no pain.  Relates further this has been going on for approximately 2 weeks with out any significant change or progression.      Review of Systems   Constitutional: Negative.    HENT: Negative.     Eyes: Negative.    Respiratory: Negative.     Cardiovascular: Negative.    Gastrointestinal: Negative.    Endocrine: Negative.    Genitourinary: Negative.    Musculoskeletal:  Positive for joint swelling. Negative for arthralgias, back pain, gait problem, myalgias, neck pain and neck stiffness.   Skin: Negative.    Allergic/Immunologic: Negative.    Neurological: Negative.    Hematological: Negative.    Psychiatric/Behavioral: Negative.       Past Medical History:   Diagnosis Date    Diabetes mellitus (HCC)     Hypertension      Past Surgical History:   Procedure Laterality Date    CARDIAC SURGERY       Family History   Problem Relation Age of Onset    Diabetes Mother     Heart attack Mother     Stroke Father     Hypertension Father      Social History     Tobacco Use    Smoking status: Former     Types: Cigarettes    Smokeless tobacco: Never   Vaping Use    Vaping status: Never Used   Substance and Sexual Activity    Alcohol use: Yes     Comment: sparingly    Drug use: No    Sexual activity: Not on file     Current Outpatient Medications on File Prior to Visit   Medication Sig    aspirin (ECOTRIN LOW STRENGTH) 81 mg EC tablet Take 1 tablet by mouth daily    b complex vitamins capsule Take 1 capsule by mouth daily    carbidopa-levodopa (SINEMET)  mg per tablet TAKE 1 &  "1/2 (ONE & ONE-HALF) TABLETS BY MOUTH THREE TIMES DAILY    cholecalciferol (VITAMIN D3) 1,000 units tablet Take 1,000 Units by mouth daily    Coenzyme Q10 (COQ10) 100 MG CAPS Take by mouth daily    insulin detemir (LEVEMIR) 100 units/mL subcutaneous injection Inject under the skin daily at bedtime 15units Qam and 18units QHS    lisinopril (ZESTRIL) 10 mg tablet Take 10 mg by mouth 2 (two) times a day    Multiple Vitamin (MULTIVITAMIN) tablet Take 1 tablet by mouth daily    Omega-3 Fatty Acids (FISH OIL) 1,000 mg Take 1,000 mg by mouth every other day      ONE TOUCH ULTRA TEST test strip     simvastatin (ZOCOR) 40 mg tablet Take 40 mg by mouth daily at bedtime      albuterol (PROVENTIL HFA,VENTOLIN HFA) 90 mcg/act inhaler Inhale 2 puffs every 6 (six) hours as needed for wheezing or shortness of breath (Patient not taking: Reported on 2/9/2024)    benzonatate (TESSALON PERLES) 100 mg capsule Take 1 capsule (100 mg total) by mouth 3 (three) times a day as needed for cough (Patient not taking: Reported on 9/5/2024)    multivitamin (THERAGRAN) TABS Take 1 tablet by mouth daily (Patient not taking: Reported on 8/29/2023)     Allergies   Allergen Reactions    Metformin GI Intolerance     Immunization History   Administered Date(s) Administered    COVID-19 Moderna mRNA Vaccine 12 Yr+ 50 mcg/0.5 mL (Spikevax) 10/17/2023    COVID-19 PFIZER VACCINE 0.3 ML IM 01/21/2021, 02/09/2021, 10/30/2021    COVID-19 Pfizer Vac BIVALENT Dipesh-sucrose 12 Yr+ IM 09/29/2022    Influenza, high dose seasonal 0.7 mL 10/31/2019, 09/16/2020, 11/01/2022     Objective     /72   Pulse (!) 53   Temp (!) 97.4 °F (36.3 °C)   Resp 16   Ht 5' 9.5\" (1.765 m)   Wt 79.4 kg (175 lb)   SpO2 99%   BMI 25.47 kg/m²     Physical Exam  Vitals and nursing note reviewed.   Constitutional:       General: He is not in acute distress.     Appearance: Normal appearance. He is not ill-appearing, toxic-appearing or diaphoretic.      Comments: Pleasant, " soft-spoken 89-year-old male who is awake alert in no acute distress oriented x 3 accompanied by his wife   DANIELA:      Head: Normocephalic and atraumatic.   Eyes:      General: No scleral icterus.        Right eye: No discharge.         Left eye: No discharge.      Extraocular Movements: Extraocular movements intact.      Conjunctiva/sclera: Conjunctivae normal.      Pupils: Pupils are equal, round, and reactive to light.   Cardiovascular:      Rate and Rhythm: Normal rate and regular rhythm.   Pulmonary:      Effort: Pulmonary effort is normal. No respiratory distress.      Breath sounds: Normal breath sounds. No stridor. No wheezing, rhonchi or rales.   Chest:      Chest wall: No tenderness.   Abdominal:      General: Abdomen is flat. Bowel sounds are normal.   Musculoskeletal:         General: Swelling present. No tenderness, deformity or signs of injury.      Cervical back: Normal range of motion and neck supple.      Right lower leg: Edema present.      Left lower leg: No edema.      Comments: Patient has erythema and swelling distal portion of his right foot which she states has been going on for approximately 2 weeks.  No pain with palpation or with range of motion to his toes.  Peripheral pulses are weak but intact   Skin:     General: Skin is warm and dry.   Neurological:      Mental Status: He is alert and oriented to person, place, and time. Mental status is at baseline.   Psychiatric:         Mood and Affect: Mood normal.         Behavior: Behavior normal.         Thought Content: Thought content normal.         Judgment: Judgment normal.

## 2024-09-20 NOTE — ASSESSMENT & PLAN NOTE
Patient is here today for acute evaluation.  States that he has been having continued swelling without pain to his right foot distally.  He has had no fever no chills or signs of infection and no accident or injury involved with his foot.  Again there is swelling and erythema is causing no discomfort.  On evaluation patient has significant swelling to the distal portion of his of right foot.  No pain to palpation.  The area is warm to the touch indicating positively a mild infection.  There is no open areas to the skin and patient continues to follow-up with his podiatrist with regular care with a history of diabetes.  With this not being painful we doubt this would be gouty arthritis and patient has no evidence of congestive heart failure on exam.  Thoughts are on this is a low-grade infection and patient was placed on Keflex 500 mg 3 times a day for 10 days.  To call on Monday with an update as to his condition and if not improving we will make sure we expedite an evaluation as soon as possible to be seen by his podiatrist.    Orders:    cephalexin (KEFLEX) 500 mg capsule; Take 1 capsule (500 mg total) by mouth 3 (three) times a day for 10 days

## 2024-09-23 ENCOUNTER — TELEPHONE (OUTPATIENT)
Age: 89
End: 2024-09-23

## 2024-09-23 DIAGNOSIS — R22.41 LOCALIZED SWELLING OF RIGHT FOOT: Primary | ICD-10-CM

## 2024-09-23 NOTE — ASSESSMENT & PLAN NOTE
Finally able to get a hold with the patient by telephone.  Apparently his foot is finally doing somewhat better but he wants to see a podiatrist.  Has a podiatrist through [facility] and we will try to send a consult for him to be seen through them.  I suggest that he call their office for an evaluation.

## 2024-09-23 NOTE — TELEPHONE ENCOUNTER
"Pt's wife called. Pt heard answering questions int he background. Wanted to follow up with PCP this morning as per 9/20/24 office visit note:    \"To call on Monday with an update as to his condition and if not improving we will make sure we expedite an evaluation as soon as possible to be seen by his podiatrist.\"    Pt reports toe is still swollen, but redness has improved. Would like an appointment with Podiatrist.     Please inform PCP and return call to Pt. Wife requests call back on her cell # [phone], as they will be leaving home today.  "

## 2024-10-03 ENCOUNTER — APPOINTMENT (OUTPATIENT)
Dept: LAB | Facility: CLINIC | Age: 89
End: 2024-10-03
Payer: COMMERCIAL

## 2024-10-03 DIAGNOSIS — I51.9 MYXEDEMA HEART DISEASE: ICD-10-CM

## 2024-10-03 DIAGNOSIS — E03.9 MYXEDEMA HEART DISEASE: ICD-10-CM

## 2024-10-03 DIAGNOSIS — E11.65 INADEQUATELY CONTROLLED DIABETES MELLITUS (HCC): ICD-10-CM

## 2024-10-03 DIAGNOSIS — E34.9 ENDOCRINE DISORDER RELATED TO PUBERTY: ICD-10-CM

## 2024-10-03 LAB
ALBUMIN SERPL BCG-MCNC: 4.1 G/DL (ref 3.5–5)
ALP SERPL-CCNC: 57 U/L (ref 34–104)
ALT SERPL W P-5'-P-CCNC: 11 U/L (ref 7–52)
AMYLASE SERPL-CCNC: 35 IU/L (ref 29–103)
ANION GAP SERPL CALCULATED.3IONS-SCNC: 9 MMOL/L (ref 4–13)
AST SERPL W P-5'-P-CCNC: 22 U/L (ref 13–39)
BACTERIA UR QL AUTO: ABNORMAL /HPF
BASOPHILS # BLD AUTO: 0.05 THOUSANDS/ΜL (ref 0–0.1)
BASOPHILS NFR BLD AUTO: 1 % (ref 0–1)
BILIRUB SERPL-MCNC: 0.59 MG/DL (ref 0.2–1)
BILIRUB UR QL STRIP: NEGATIVE
BUN SERPL-MCNC: 27 MG/DL (ref 5–25)
CALCIUM SERPL-MCNC: 9.4 MG/DL (ref 8.4–10.2)
CHLORIDE SERPL-SCNC: 105 MMOL/L (ref 96–108)
CLARITY UR: CLEAR
CO2 SERPL-SCNC: 29 MMOL/L (ref 21–32)
COLOR UR: ABNORMAL
CREAT SERPL-MCNC: 1.75 MG/DL (ref 0.6–1.3)
CREAT UR-MCNC: 82.9 MG/DL
EOSINOPHIL # BLD AUTO: 0.33 THOUSAND/ΜL (ref 0–0.61)
EOSINOPHIL NFR BLD AUTO: 5 % (ref 0–6)
ERYTHROCYTE [DISTWIDTH] IN BLOOD BY AUTOMATED COUNT: 13.3 % (ref 11.6–15.1)
EST. AVERAGE GLUCOSE BLD GHB EST-MCNC: 148 MG/DL
GFR SERPL CREATININE-BSD FRML MDRD: 34 ML/MIN/1.73SQ M
GLUCOSE P FAST SERPL-MCNC: 84 MG/DL (ref 65–99)
GLUCOSE UR STRIP-MCNC: NEGATIVE MG/DL
HBA1C MFR BLD: 6.8 %
HCT VFR BLD AUTO: 42.2 % (ref 36.5–49.3)
HGB BLD-MCNC: 13.8 G/DL (ref 12–17)
HGB UR QL STRIP.AUTO: NEGATIVE
IMM GRANULOCYTES # BLD AUTO: 0.02 THOUSAND/UL (ref 0–0.2)
IMM GRANULOCYTES NFR BLD AUTO: 0 % (ref 0–2)
KETONES UR STRIP-MCNC: NEGATIVE MG/DL
LEUKOCYTE ESTERASE UR QL STRIP: NEGATIVE
LIPASE SERPL-CCNC: 25 U/L (ref 11–82)
LYMPHOCYTES # BLD AUTO: 1.72 THOUSANDS/ΜL (ref 0.6–4.47)
LYMPHOCYTES NFR BLD AUTO: 26 % (ref 14–44)
MAGNESIUM SERPL-MCNC: 2.1 MG/DL (ref 1.9–2.7)
MCH RBC QN AUTO: 30.9 PG (ref 26.8–34.3)
MCHC RBC AUTO-ENTMCNC: 32.7 G/DL (ref 31.4–37.4)
MCV RBC AUTO: 95 FL (ref 82–98)
MICROALBUMIN UR-MCNC: <7 MG/L
MONOCYTES # BLD AUTO: 0.52 THOUSAND/ΜL (ref 0.17–1.22)
MONOCYTES NFR BLD AUTO: 8 % (ref 4–12)
NEUTROPHILS # BLD AUTO: 4.04 THOUSANDS/ΜL (ref 1.85–7.62)
NEUTS SEG NFR BLD AUTO: 60 % (ref 43–75)
NITRITE UR QL STRIP: NEGATIVE
NON-SQ EPI CELLS URNS QL MICRO: ABNORMAL /HPF
NRBC BLD AUTO-RTO: 0 /100 WBCS
PH UR STRIP.AUTO: 6.5 [PH]
PHOSPHATE SERPL-MCNC: 3.6 MG/DL (ref 2.3–4.1)
PLATELET # BLD AUTO: 203 THOUSANDS/UL (ref 149–390)
PMV BLD AUTO: 10.7 FL (ref 8.9–12.7)
POTASSIUM SERPL-SCNC: 5 MMOL/L (ref 3.5–5.3)
PROT SERPL-MCNC: 7.1 G/DL (ref 6.4–8.4)
PROT UR STRIP-MCNC: ABNORMAL MG/DL
RBC # BLD AUTO: 4.46 MILLION/UL (ref 3.88–5.62)
RBC #/AREA URNS AUTO: ABNORMAL /HPF
SODIUM SERPL-SCNC: 143 MMOL/L (ref 135–147)
SP GR UR STRIP.AUTO: 1.02 (ref 1–1.03)
T4 FREE SERPL-MCNC: 0.72 NG/DL (ref 0.61–1.12)
TSH SERPL DL<=0.05 MIU/L-ACNC: 4.9 UIU/ML (ref 0.45–4.5)
UROBILINOGEN UR STRIP-ACNC: <2 MG/DL
WBC # BLD AUTO: 6.68 THOUSAND/UL (ref 4.31–10.16)
WBC #/AREA URNS AUTO: ABNORMAL /HPF

## 2024-10-03 PROCEDURE — 83735 ASSAY OF MAGNESIUM: CPT

## 2024-10-03 PROCEDURE — 82570 ASSAY OF URINE CREATININE: CPT

## 2024-10-03 PROCEDURE — 85025 COMPLETE CBC W/AUTO DIFF WBC: CPT

## 2024-10-03 PROCEDURE — 83690 ASSAY OF LIPASE: CPT

## 2024-10-03 PROCEDURE — 84443 ASSAY THYROID STIM HORMONE: CPT

## 2024-10-03 PROCEDURE — 84100 ASSAY OF PHOSPHORUS: CPT

## 2024-10-03 PROCEDURE — 82043 UR ALBUMIN QUANTITATIVE: CPT

## 2024-10-03 PROCEDURE — 81001 URINALYSIS AUTO W/SCOPE: CPT

## 2024-10-03 PROCEDURE — 82150 ASSAY OF AMYLASE: CPT

## 2024-10-03 PROCEDURE — 36415 COLL VENOUS BLD VENIPUNCTURE: CPT

## 2024-10-03 PROCEDURE — 80053 COMPREHEN METABOLIC PANEL: CPT

## 2024-10-03 PROCEDURE — 83036 HEMOGLOBIN GLYCOSYLATED A1C: CPT

## 2024-10-03 PROCEDURE — 84439 ASSAY OF FREE THYROXINE: CPT

## 2024-10-05 PROBLEM — Z00.00 MEDICARE ANNUAL WELLNESS VISIT, SUBSEQUENT: Status: RESOLVED | Noted: 2018-10-04 | Resolved: 2024-10-05

## 2024-10-07 ENCOUNTER — TELEPHONE (OUTPATIENT)
Age: 89
End: 2024-10-07

## 2024-10-07 NOTE — TELEPHONE ENCOUNTER
Another patient was already booked at this time slot since 738 this AM. Unsure why her screen did not refresh..    Fabby can you see If Dr BORREGO can see the patient today by any chance      Thanks

## 2024-10-07 NOTE — TELEPHONE ENCOUNTER
Please schedule patient for the 11:40 am same day slot for Dr. Hua. Patient has gout in right foot and needs to be seen asap.

## 2024-10-08 ENCOUNTER — OFFICE VISIT (OUTPATIENT)
Age: 89
End: 2024-10-08
Payer: COMMERCIAL

## 2024-10-08 VITALS — HEART RATE: 55 BPM | BODY MASS INDEX: 24.6 KG/M2 | HEIGHT: 70 IN | OXYGEN SATURATION: 99 % | WEIGHT: 171.8 LBS

## 2024-10-08 DIAGNOSIS — Z79.4 TYPE 2 DIABETES MELLITUS WITHOUT COMPLICATION, WITH LONG-TERM CURRENT USE OF INSULIN (HCC): ICD-10-CM

## 2024-10-08 DIAGNOSIS — R00.1 BRADYCARDIA: ICD-10-CM

## 2024-10-08 DIAGNOSIS — I10 BENIGN ESSENTIAL HYPERTENSION: ICD-10-CM

## 2024-10-08 DIAGNOSIS — M10.9 ACUTE GOUT OF RIGHT FOOT, UNSPECIFIED CAUSE: Primary | ICD-10-CM

## 2024-10-08 DIAGNOSIS — E11.9 TYPE 2 DIABETES MELLITUS WITHOUT COMPLICATION, WITH LONG-TERM CURRENT USE OF INSULIN (HCC): ICD-10-CM

## 2024-10-08 PROCEDURE — 99214 OFFICE O/P EST MOD 30 MIN: CPT | Performed by: INTERNAL MEDICINE

## 2024-10-08 PROCEDURE — G2211 COMPLEX E/M VISIT ADD ON: HCPCS | Performed by: INTERNAL MEDICINE

## 2024-10-08 RX ORDER — ALLOPURINOL 100 MG/1
100 TABLET ORAL DAILY
Qty: 30 TABLET | Refills: 5 | Status: SHIPPED | OUTPATIENT
Start: 2024-10-08

## 2024-10-08 RX ORDER — COLCHICINE 0.6 MG/1
0.6 TABLET ORAL DAILY
Qty: 30 TABLET | Refills: 5 | Status: SHIPPED | OUTPATIENT
Start: 2024-10-08

## 2024-10-08 NOTE — PROGRESS NOTES
Ambulatory Visit  Name: Trent Tam Jr.      : 1935      MRN: [id number]  Encounter Provider: Jaswant Hua DO  Encounter Date: 10/8/2024   Encounter department: [facility] INTERNAL MEDICINE    Assessment & Plan  Benign essential hypertension  Longstanding history of hypertension.  Remains on medication as previously.  He is not on a diuretic which could exacerbate gouty symptoms and increased uric acid level.  Patient was given a handout sheet by his podiatrist as to foods that he needs to avoid    Orders:    Basic metabolic panel; Future    Acute gout of right foot, unspecified cause  Since his last visit the patient has been seen evaluated by podiatry and they agree with the diagnosis of gouty arthritis to the great toe.  States that the pain has decreased but still has some swelling and is concerned as far as [facility] term for treatment is concerned.  We feel its appropriate with this patient to attack this from 2 angles.  First he was placed on colchicine to take daily as needed to help with any acute pain.  He was told that once all the pain and swelling has reduced he can stop this medication.  Along with this we placed him on a low-dose of allopurinol 100 mg daily which will help reduce his uric acid level.  On exam today patient continues to some swelling at the base of his great toe on the right she has very slight tenderness to palpation.  His podiatrist did x-rays and states there has been significant erosion to the joint at the base of his toe.  Patient is to continue to keep us informed of his condition check basic metabolic profile and uric acid level with his next visit    Orders:    colchicine (COLCRYS) 0.6 mg tablet; Take 1 tablet (0.6 mg total) by mouth daily    allopurinol (ZYLOPRIM) 100 mg tablet; Take 1 tablet (100 mg total) by mouth daily    Uric acid; Future    Basic metabolic panel; Future    Type 2 diabetes mellitus without complication, with long-term current use of  insulin (HCC)  Continues to follow-up with his endocrinologist and his sugars continue to be under adequate control with present treatment.  Lab Results   Component Value Date    HGBA1C 6.8 (H) 10/03/2024            Bradycardia              History of Present Illness     With his last visit patient was seen and evaluated with pain and swelling the area of his right foot especially around the great toe.  A tentative diagnosis of gouty arthritis was made and patient subsequently was seen by his podiatrist who agrees.  States the discomfort in his great toe has decreased but he still has some swelling and only minimal discomfort.  Did have x-rays with the podiatrist and they said there is significant erosion at the joint at the base of his great toe on the right.      Review of Systems   Constitutional: Negative.    HENT: Negative.     Eyes: Negative.    Respiratory: Negative.     Cardiovascular: Negative.    Gastrointestinal: Negative.    Endocrine: Negative.    Genitourinary: Negative.    Musculoskeletal:  Positive for arthralgias and joint swelling. Negative for back pain, gait problem, myalgias, neck pain and neck stiffness.   Skin: Negative.    Allergic/Immunologic: Negative.    Neurological: Negative.    Hematological: Negative.    Psychiatric/Behavioral: Negative.       Past Medical History:   Diagnosis Date    Diabetes mellitus (HCC)     Hypertension      Past Surgical History:   Procedure Laterality Date    CARDIAC SURGERY       Family History   Problem Relation Age of Onset    Diabetes Mother     Heart attack Mother     Stroke Father     Hypertension Father      Social History     Tobacco Use    Smoking status: Former     Types: Cigarettes    Smokeless tobacco: Never   Vaping Use    Vaping status: Never Used   Substance and Sexual Activity    Alcohol use: Yes     Comment: sparingly    Drug use: No    Sexual activity: Not on file     Current Outpatient Medications on File Prior to Visit   Medication Sig     "aspirin (ECOTRIN LOW STRENGTH) 81 mg EC tablet Take 1 tablet by mouth daily    b complex vitamins capsule Take 1 capsule by mouth daily    carbidopa-levodopa (SINEMET)  mg per tablet TAKE 1 & 1/2 (ONE & ONE-HALF) TABLETS BY MOUTH THREE TIMES DAILY    cholecalciferol (VITAMIN D3) 1,000 units tablet Take 1,000 Units by mouth daily    Coenzyme Q10 (COQ10) 100 MG CAPS Take by mouth daily    insulin detemir (LEVEMIR) 100 units/mL subcutaneous injection Inject under the skin daily at bedtime 15units Qam and 18units QHS    lisinopril (ZESTRIL) 10 mg tablet Take 10 mg by mouth 2 (two) times a day    Multiple Vitamin (MULTIVITAMIN) tablet Take 1 tablet by mouth daily    Omega-3 Fatty Acids (FISH OIL) 1,000 mg Take 1,000 mg by mouth every other day      ONE TOUCH ULTRA TEST test strip     simvastatin (ZOCOR) 40 mg tablet Take 40 mg by mouth daily at bedtime      albuterol (PROVENTIL HFA,VENTOLIN HFA) 90 mcg/act inhaler Inhale 2 puffs every 6 (six) hours as needed for wheezing or shortness of breath (Patient not taking: Reported on 2/9/2024)    benzonatate (TESSALON PERLES) 100 mg capsule Take 1 capsule (100 mg total) by mouth 3 (three) times a day as needed for cough (Patient not taking: Reported on 9/5/2024)    multivitamin (THERAGRAN) TABS Take 1 tablet by mouth daily (Patient not taking: Reported on 8/29/2023)     Allergies   Allergen Reactions    Metformin GI Intolerance     Immunization History   Administered Date(s) Administered    COVID-19 Moderna mRNA Vaccine 12 Yr+ 50 mcg/0.5 mL (Spikevax) 10/17/2023    COVID-19 PFIZER VACCINE 0.3 ML IM 01/21/2021, 02/09/2021, 10/30/2021    COVID-19 Pfizer Vac BIVALENT Dipesh-sucrose 12 Yr+ IM 09/29/2022    Influenza, high dose seasonal 0.7 mL 10/31/2019, 09/16/2020, 11/01/2022     Objective     Pulse 55   Ht 5' 9.5\" (1.765 m)   Wt 77.9 kg (171 lb 12.8 oz)   SpO2 99%   BMI 25.01 kg/m²     Physical Exam  Vitals and nursing note reviewed.   Constitutional:       General: He is " not in acute distress.     Appearance: Normal appearance. He is not ill-appearing, toxic-appearing or diaphoretic.      Comments: Pleasant, soft-spoken 89-year-old male who is awake alert in no acute distress oriented x 3 accompanied by his wife   DANIELA:      Head: Normocephalic and atraumatic.      Right Ear: Tympanic membrane, ear canal and external ear normal. There is no impacted cerumen.      Left Ear: Tympanic membrane, ear canal and external ear normal. There is no impacted cerumen.      Nose: Nose normal. No congestion or rhinorrhea.      Mouth/Throat:      Mouth: Mucous membranes are moist.      Pharynx: Oropharynx is clear. No oropharyngeal exudate or posterior oropharyngeal erythema.   Eyes:      General: No scleral icterus.        Right eye: No discharge.         Left eye: No discharge.      Extraocular Movements: Extraocular movements intact.      Conjunctiva/sclera: Conjunctivae normal.      Pupils: Pupils are equal, round, and reactive to light.   Neck:      Vascular: No carotid bruit.   Cardiovascular:      Rate and Rhythm: Normal rate and regular rhythm.   Pulmonary:      Effort: Pulmonary effort is normal. No respiratory distress.      Breath sounds: Normal breath sounds. No stridor. No wheezing, rhonchi or rales.   Chest:      Chest wall: No tenderness.   Abdominal:      General: Abdomen is flat. Bowel sounds are normal. There is no distension.      Palpations: There is no mass.      Tenderness: There is no abdominal tenderness. There is no left CVA tenderness, guarding or rebound.      Hernia: No hernia is present.   Musculoskeletal:         General: Swelling present. No tenderness, deformity or signs of injury.      Cervical back: Normal range of motion and neck supple. No rigidity or tenderness.      Right lower leg: Edema present.      Left lower leg: No edema.      Comments: Patient has erythema and swelling distal portion of his right foot which she states has been going on for approximately  2 weeks.  No pain with palpation or with range of motion to his toes.  Peripheral pulses are weak but intact   Lymphadenopathy:      Cervical: No cervical adenopathy.   Skin:     General: Skin is warm and dry.      Coloration: Skin is not jaundiced or pale.      Findings: No bruising, erythema, lesion or rash.   Neurological:      Mental Status: He is alert and oriented to person, place, and time. Mental status is at baseline.   Psychiatric:         Mood and Affect: Mood normal.         Behavior: Behavior normal.         Thought Content: Thought content normal.         Judgment: Judgment normal.

## 2024-10-08 NOTE — ASSESSMENT & PLAN NOTE
Since his last visit the patient has been seen evaluated by podiatry and they agree with the diagnosis of gouty arthritis to the great toe.  States that the pain has decreased but still has some swelling and is concerned as far as [facility] term for treatment is concerned.  We feel its appropriate with this patient to attack this from 2 angles.  First he was placed on colchicine to take daily as needed to help with any acute pain.  He was told that once all the pain and swelling has reduced he can stop this medication.  Along with this we placed him on a low-dose of allopurinol 100 mg daily which will help reduce his uric acid level.  On exam today patient continues to some swelling at the base of his great toe on the right she has very slight tenderness to palpation.  His podiatrist did x-rays and states there has been significant erosion to the joint at the base of his toe.  Patient is to continue to keep us informed of his condition check basic metabolic profile and uric acid level with his next visit    Orders:    colchicine (COLCRYS) 0.6 mg tablet; Take 1 tablet (0.6 mg total) by mouth daily    allopurinol (ZYLOPRIM) 100 mg tablet; Take 1 tablet (100 mg total) by mouth daily    Uric acid; Future    Basic metabolic panel; Future

## 2024-10-08 NOTE — ASSESSMENT & PLAN NOTE
Continues to follow-up with his endocrinologist and his sugars continue to be under adequate control with present treatment.  Lab Results   Component Value Date    HGBA1C 6.8 (H) 10/03/2024

## 2024-10-08 NOTE — ASSESSMENT & PLAN NOTE
Longstanding history of hypertension.  Remains on medication as previously.  He is not on a diuretic which could exacerbate gouty symptoms and increased uric acid level.  Patient was given a handout sheet by his podiatrist as to foods that he needs to avoid    Orders:    Basic metabolic panel; Future

## 2024-12-18 ENCOUNTER — OFFICE VISIT (OUTPATIENT)
Dept: CARDIOLOGY CLINIC | Facility: CLINIC | Age: 89
End: 2024-12-18
Payer: COMMERCIAL

## 2024-12-18 VITALS
SYSTOLIC BLOOD PRESSURE: 118 MMHG | HEART RATE: 56 BPM | WEIGHT: 175 LBS | DIASTOLIC BLOOD PRESSURE: 66 MMHG | HEIGHT: 70 IN | BODY MASS INDEX: 25.05 KG/M2

## 2024-12-18 DIAGNOSIS — I25.10 CORONARY ARTERY DISEASE INVOLVING NATIVE CORONARY ARTERY OF NATIVE HEART WITHOUT ANGINA PECTORIS: Primary | ICD-10-CM

## 2024-12-18 DIAGNOSIS — I49.5 SINUS NODE DYSFUNCTION (HCC): ICD-10-CM

## 2024-12-18 DIAGNOSIS — E78.00 PURE HYPERCHOLESTEROLEMIA: ICD-10-CM

## 2024-12-18 DIAGNOSIS — I10 BENIGN ESSENTIAL HYPERTENSION: ICD-10-CM

## 2024-12-18 PROCEDURE — 93000 ELECTROCARDIOGRAM COMPLETE: CPT | Performed by: INTERNAL MEDICINE

## 2024-12-18 PROCEDURE — 99204 OFFICE O/P NEW MOD 45 MIN: CPT | Performed by: INTERNAL MEDICINE

## 2024-12-18 RX ORDER — LISINOPRIL 10 MG/1
10 TABLET ORAL 2 TIMES DAILY
Qty: 180 TABLET | Refills: 3 | Status: SHIPPED | OUTPATIENT
Start: 2024-12-18

## 2024-12-18 NOTE — PROGRESS NOTES
[facility] Cardiology  Office Consultation  Trent Tam Jr. 89 y.o. male MRN: [id number]        Chief Complaint    Chief Complaint   Patient presents with    Advice Only     Np- [facility] care, previous Dr. Magana pt.   No cardiac concerns.        Referring Provider: Self, Referral    Impression & Plan:    1. Coronary artery disease involving native coronary artery of native heart without angina pectoris (Primary)  Stable without angina  Continue current therapy.  - POCT ECG    2. Pure hypercholesterolemia  Continue simvastatin 40 mg daily    3. Benign essential hypertension  Controlled on current therapy.   - lisinopril (ZESTRIL) 10 mg tablet; Take 1 tablet (10 mg total) by mouth 2 (two) times a day  Dispense: 180 tablet; Refill: 3    4. Sinus node dysfunction (HCC)  He has no bradycardic symptoms or symptoms of chronotropic incompetence at this time. We will continue to clinically monitor. We discussed the pathophysiology of SND and indications for intervening with PPM, namely symptoms of bradycardia.         We will see Trent Tam Jr. back in 6 months for routine follow-up.    HPI: Trent Tam Jr. is a 89 y.o. year old male with CAD s/p PCI to LAD in 1998 and to RCA in 2001, SSS , DM2, HTN, Parkinson's disease, HLD,  presenting to [facility].    He was previously seen by Dr. Chino Barrientos at [facility] who recently retired.     No cardiac complaints. He bowls 3 times per week without any cardiac symptoms. Denies lightheadedness or presyncope with exertion or rest.       Cardiac testing:       Low-level exercise/pharmacological MPI January 8, 2020: moderate ischemia in a small area affecting the apical cap. Post-stress EF was normal, 68%. Although he started out with sinus bradycardia, heart rate response to low-level exercise and pharmacological stress was normal.  *Patient does not want another stress test.     Holter January 14, 2021:   1. Dominant rhythm was sinus bradycardia, rate 33 to 111 ,  average 48 BPM.  2. No high-grade block, longest RR interval was 2.1 sec.  3. Circadian pattern was normal.  4. PVC density was 5.1% with 331 couplets, 12 triplets, one 4-beat IVR  5. SVE density was 1.1 %. There were 10 runs of PAT. The longest was 11 beat but relatively slow.  6. Diary was submitted with no symptoms reported.  7. Compared to prior study of November 2018, PVC density is higher (1.5% then), and HR overall is slower (ave 58 BPM then).      EKG reviewed personally:   12/18/24 - sinus bradycardia, 56 bpm, anterior infarct, abnormal study.       Relevant Laboratory Studies:  (Laboratory studies personally reviewed)  Lipid panel 3/1/22 - , TG 65, LDL 78, HDL 43 mg/dL  Lipid panel 8/24/23 - LDL 67 mg/dL   CMP 10/3/24 - K 5.0, Cr 1.75      Review of Systems      Past Medical History:   Diagnosis Date    Diabetes mellitus (HCC)     Hypertension      Past Surgical History:   Procedure Laterality Date    CARDIAC SURGERY       Social History     Substance and Sexual Activity   Alcohol Use Yes    Comment: sparingly     Social History     Substance and Sexual Activity   Drug Use No     Social History     Tobacco Use   Smoking Status Former    Types: Cigarettes   Smokeless Tobacco Never     Family History   Problem Relation Age of Onset    Diabetes Mother     Heart attack Mother     Stroke Father     Hypertension Father        Allergies:  Allergies   Allergen Reactions    Metformin GI Intolerance       Medications (as of START of this encounter):   Outpatient Medications Prior to Visit   Medication Sig Dispense Refill    allopurinol (ZYLOPRIM) 100 mg tablet Take 1 tablet (100 mg total) by mouth daily 30 tablet 5    aspirin (ECOTRIN LOW STRENGTH) 81 mg EC tablet Take 1 tablet by mouth daily      b complex vitamins capsule Take 1 capsule by mouth daily      carbidopa-levodopa (SINEMET)  mg per tablet TAKE 1 & 1/2 (ONE & ONE-HALF) TABLETS BY MOUTH THREE TIMES DAILY 450 tablet 1    cholecalciferol  (VITAMIN D3) 1,000 units tablet Take 1,000 Units by mouth daily      Coenzyme Q10 (COQ10) 100 MG CAPS Take by mouth daily      colchicine (COLCRYS) 0.6 mg tablet Take 1 tablet (0.6 mg total) by mouth daily 30 tablet 5    insulin detemir (LEVEMIR) 100 units/mL subcutaneous injection Inject under the skin daily at bedtime 15units Qam and 18units QHS      lisinopril (ZESTRIL) 10 mg tablet Take 10 mg by mouth 2 (two) times a day      Multiple Vitamin (MULTIVITAMIN) tablet Take 1 tablet by mouth daily      Omega-3 Fatty Acids (FISH OIL) 1,000 mg Take 1,000 mg by mouth every other day        ONE TOUCH ULTRA TEST test strip       simvastatin (ZOCOR) 40 mg tablet Take 40 mg by mouth daily at bedtime        albuterol (PROVENTIL HFA,VENTOLIN HFA) 90 mcg/act inhaler Inhale 2 puffs every 6 (six) hours as needed for wheezing or shortness of breath (Patient not taking: Reported on 2/9/2024) 18 g 1    benzonatate (TESSALON PERLES) 100 mg capsule Take 1 capsule (100 mg total) by mouth 3 (three) times a day as needed for cough (Patient not taking: Reported on 9/5/2024) 45 capsule 2    multivitamin (THERAGRAN) TABS Take 1 tablet by mouth daily (Patient not taking: Reported on 8/29/2023)       No facility-administered medications prior to visit.         Vitals:    12/18/24 1055   BP: 118/66   Pulse: 56     Weight (last 2 days)       Date/Time Weight    12/18/24 1055 79.4 (175)            General: Trent Tam Jr. is a well appearing male, in no acute distress, sitting comfortably  HEENT: moist mucous membranes, EOMI  Neck:  No JVD, supple, trachea midline   Cardiovascular: unremarkable S1/S2, regular rate and rhythm, no murmurs, rubs or gallops   Pulmonary: normal respiratory effort, CTAB   Abdomen: soft and nondistended  Extremities: No lower extremity edema. Warm and well perfused extremities.   Neuro: no focal motor deficits, AAOx3 (person, place, time)  Psych: Normal mood and affect, cooperative          Donavan Kathleen  "MD    This note was completed in part utilizing [employer] recognition software. Grammatical errors, random word insertion, spelling mistakes, occasional wrong word or \"sound-alike\" substitutions and incomplete sentences may be an occasional consequence of the system secondary to software limitations, ambient noise and hardware issues. At the time of dictation, efforts were made to edit, clarify and /or correct errors.  Please read the chart carefully and recognize, using context, where substitutions have occurred.  If you have any questions or concerns about the context, text or information contained within the body of this dictation, please contact myself, the provider, for further clarification.    "

## 2025-02-03 ENCOUNTER — RA CDI HCC (OUTPATIENT)
Dept: OTHER | Facility: HOSPITAL | Age: 89
End: 2025-02-03

## 2025-02-04 ENCOUNTER — OFFICE VISIT (OUTPATIENT)
Age: 89
End: 2025-02-04
Payer: COMMERCIAL

## 2025-02-04 VITALS
OXYGEN SATURATION: 98 % | BODY MASS INDEX: 25.22 KG/M2 | SYSTOLIC BLOOD PRESSURE: 120 MMHG | HEIGHT: 70 IN | RESPIRATION RATE: 18 BRPM | WEIGHT: 176.2 LBS | HEART RATE: 56 BPM | TEMPERATURE: 96.9 F | DIASTOLIC BLOOD PRESSURE: 62 MMHG

## 2025-02-04 DIAGNOSIS — M10.9 ACUTE GOUT OF RIGHT FOOT, UNSPECIFIED CAUSE: ICD-10-CM

## 2025-02-04 DIAGNOSIS — E78.00 PURE HYPERCHOLESTEROLEMIA: ICD-10-CM

## 2025-02-04 DIAGNOSIS — N40.0 ENLARGED PROSTATE WITHOUT LOWER URINARY TRACT SYMPTOMS (LUTS): Primary | ICD-10-CM

## 2025-02-04 DIAGNOSIS — E11.51 TYPE 2 DIABETES MELLITUS WITH DIABETIC PERIPHERAL ANGIOPATHY WITHOUT GANGRENE, WITH LONG-TERM CURRENT USE OF INSULIN (HCC): ICD-10-CM

## 2025-02-04 DIAGNOSIS — Z79.4 TYPE 2 DIABETES MELLITUS WITH DIABETIC PERIPHERAL ANGIOPATHY WITHOUT GANGRENE, WITH LONG-TERM CURRENT USE OF INSULIN (HCC): ICD-10-CM

## 2025-02-04 DIAGNOSIS — R00.1 BRADYCARDIA: ICD-10-CM

## 2025-02-04 DIAGNOSIS — I25.10 ARTERIOSCLEROTIC CARDIOVASCULAR DISEASE: ICD-10-CM

## 2025-02-04 DIAGNOSIS — I10 BENIGN ESSENTIAL HYPERTENSION: ICD-10-CM

## 2025-02-04 DIAGNOSIS — G20.B2 PARKINSON'S DISEASE WITH DYSKINESIA AND FLUCTUATING MANIFESTATIONS (HCC): ICD-10-CM

## 2025-02-04 PROCEDURE — 99214 OFFICE O/P EST MOD 30 MIN: CPT | Performed by: INTERNAL MEDICINE

## 2025-02-04 PROCEDURE — G2211 COMPLEX E/M VISIT ADD ON: HCPCS | Performed by: INTERNAL MEDICINE

## 2025-02-04 RX ORDER — INSULIN GLARGINE 100 [IU]/ML
20 INJECTION, SOLUTION SUBCUTANEOUS 2 TIMES DAILY
COMMUNITY
Start: 2024-12-30

## 2025-02-04 NOTE — ASSESSMENT & PLAN NOTE
Remains on medication as per his neurologist.  Patient states that his tremor is controlled with present treatment.  Patient admits that he has no difficulties with either ambulation and he is bowling 3 times a week.

## 2025-02-04 NOTE — ASSESSMENT & PLAN NOTE
Blood pressure is controlled.  Patient will continue present medication and surveillance.  He does have a slip to check on his renal function in the near future.

## 2025-02-04 NOTE — ASSESSMENT & PLAN NOTE
His cardiologist has just retired.  Patient will be following up and has established care with another cardiologist through the [facility]'s system.  Patient denies any chest pain or pressure no increasing shortness of breath with exertion.  Knows that if he has any cardiac symptoms or difficulties that he must proceed immediately to the emergency room for evaluation

## 2025-02-04 NOTE — ASSESSMENT & PLAN NOTE
History of hyperlipidemia.  Patient remains on simvastatin 40 mg daily.  He admits that he is not always compliant as far as diet is concerned.  He does have a slip to check on a lipid profile but not for another month.  Will await the results and make further adjustments of medication if needed.

## 2025-02-04 NOTE — ASSESSMENT & PLAN NOTE
Patient continues to follow-up with his endocrinologist and they continue to monitor his blood sugar readings.  He states he has had no changes in his insulin dose and is scheduled for repeat studies to be performed prior to being seen by endocrinologist in the future.  Patient continues to follow-up with his ophthalmologist and also with his podiatrist on a regular basis.  Will obtain old records and await results of testing with his endocrinologist  Lab Results   Component Value Date    HGBA1C 6.8 (H) 10/03/2024

## 2025-02-04 NOTE — PROGRESS NOTES
Name: Trent Tam Jr.      : 1935      MRN: [id number]  Encounter Provider: Jaswant Hua DO  Encounter Date: 2025   Encounter department: [facility] INTERNAL MEDICINE    Assessment & Plan  Enlarged prostate without lower urinary tract symptoms (luts)  Patient states that he needs to make appointment with a new urologist with his previous urologist no longer being covered by his medical plan.  Patient is awaiting an appointment for evaluation and further treatment for his BPH.  No new urinary complaints and we did review the results of MRI of         Pure hypercholesterolemia  History of hyperlipidemia.  Patient remains on simvastatin 40 mg daily.  He admits that he is not always compliant as far as diet is concerned.  He does have a slip to check on a lipid profile but not for another month.  Will await the results and make further adjustments of medication if needed.         Type 2 diabetes mellitus with diabetic peripheral 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, with long-term current use of insulin (HCC)  Patient continues to follow-up with his endocrinologist and they continue to monitor his blood sugar readings.  He states he has had no changes in his insulin dose and is scheduled for repeat studies to be performed prior to being seen by endocrinologist in the future.  Patient continues to follow-up with his ophthalmologist and also with his podiatrist on a regular basis.  Will obtain old records and await results of testing with his endocrinologist  Lab Results   Component Value Date    HGBA1C 6.8 (H) 10/03/2024            Acute gout of right foot, unspecified cause  History of gout.  Remains on allopurinol.  His endocrinologist has ordered patient to have a uric acid level checked.  No exacerbations of his gout         Arteriosclerotic cardiovascular disease  His cardiologist has just retired.  Patient will be following up and has established care with another cardiologist through the [facility].  Patient denies any chest pain or pressure no increasing shortness of breath with exertion.  Knows that if he has any cardiac symptoms or difficulties that he must proceed immediately to the emergency room for evaluation         Benign essential hypertension  Blood pressure is controlled.  Patient will continue present medication and surveillance.  He does have a slip to check on his renal function in the near future.         Bradycardia  Bradycardia.  No significant changes.  Denies any lightheadedness or dizziness.         Parkinson's disease with dyskinesia and fluctuating manifestations (HCC)  Remains on medication as per his neurologist.  Patient states that his tremor is controlled with present treatment.  Patient admits that he has no difficulties with either ambulation and he is bowling 3 times a week.              History of Present Illness     Patient is an 89-year-old male history of significant medical problems outlined previously who is here today for follow-up accompanied by his wife.  Patient states that he is doing relatively well.  He continues to follow-up with his specialists including his endocrinologist and his now being seen by a new cardiologist and in the near future and new urologist.  He relates that his physicians continue to retire.  Patient denies any new medical problems or concerns and there has been no changes with his medication  Review of Systems   Constitutional: Negative.    HENT: Negative.     Eyes: Negative.    Respiratory: Negative.     Cardiovascular: Negative.    Gastrointestinal: Negative.    Endocrine: Negative.    Genitourinary: Negative.    Musculoskeletal: Negative.    Skin: Negative.    Allergic/Immunologic: Negative.    Neurological: Negative.    Hematological: Negative.    Psychiatric/Behavioral: Negative.     Past Medical History:   Diagnosis Date   • Diabetes mellitus (HCC)    • Hypertension      Past Surgical History:   Procedure Laterality Date   •  CARDIAC SURGERY       Family History   Problem Relation Age of Onset   • Diabetes Mother    • Heart attack Mother    • Stroke Father    • Hypertension Father      Social History     Tobacco Use   • Smoking status: Former     Types: Cigarettes   • Smokeless tobacco: Never   Vaping Use   • Vaping status: Never Used   Substance and Sexual Activity   • Alcohol use: Yes     Comment: sparingly   • Drug use: No   • Sexual activity: Not on file     Current Outpatient Medications on File Prior to Visit   Medication Sig   • allopurinol (ZYLOPRIM) 100 mg tablet Take 1 tablet (100 mg total) by mouth daily   • aspirin (ECOTRIN LOW STRENGTH) 81 mg EC tablet Take 1 tablet by mouth daily   • b complex vitamins capsule Take 1 capsule by mouth daily   • carbidopa-levodopa (SINEMET)  mg per tablet TAKE 1 & 1/2 (ONE & ONE-HALF) TABLETS BY MOUTH THREE TIMES DAILY   • cholecalciferol (VITAMIN D3) 1,000 units tablet Take 1,000 Units by mouth daily   • Coenzyme Q10 (COQ10) 100 MG CAPS Take by mouth daily   • colchicine (COLCRYS) 0.6 mg tablet Take 1 tablet (0.6 mg total) by mouth daily   • Lantus SoloStar 100 units/mL SOPN Inject 20 Units as directed 2 (two) times a day   • lisinopril (ZESTRIL) 10 mg tablet Take 1 tablet (10 mg total) by mouth 2 (two) times a day   • Multiple Vitamin (MULTIVITAMIN) tablet Take 1 tablet by mouth daily   • Omega-3 Fatty Acids (FISH OIL) 1,000 mg Take 1,000 mg by mouth every other day     • ONE TOUCH ULTRA TEST test strip    • simvastatin (ZOCOR) 40 mg tablet Take 40 mg by mouth daily at bedtime     • albuterol (PROVENTIL HFA,VENTOLIN HFA) 90 mcg/act inhaler Inhale 2 puffs every 6 (six) hours as needed for wheezing or shortness of breath (Patient not taking: Reported on 2/9/2024)   • benzonatate (TESSALON PERLES) 100 mg capsule Take 1 capsule (100 mg total) by mouth 3 (three) times a day as needed for cough (Patient not taking: Reported on 9/5/2024)   • insulin detemir (LEVEMIR) 100 units/mL  "subcutaneous injection Inject under the skin daily at bedtime 15units Qam and 18units QHS (Patient not taking: Reported on 2/4/2025)   • multivitamin (THERAGRAN) TABS Take 1 tablet by mouth daily (Patient not taking: Reported on 8/29/2023)     Allergies   Allergen Reactions   • Metformin GI Intolerance     Immunization History   Administered Date(s) Administered   • COVID-19 Moderna mRNA Vaccine 12 Yr+ 50 mcg/0.5 mL (Spikevax) 10/17/2023   • COVID-19 PFIZER VACCINE 0.3 ML IM 01/21/2021, 02/09/2021, 10/30/2021   • COVID-19 Pfizer Vac BIVALENT Dipesh-sucrose 12 Yr+ IM 09/29/2022   • Influenza, high dose seasonal 0.7 mL 10/31/2019, 09/16/2020, 11/01/2022     Objective   /62   Pulse 56   Temp (!) 96.9 °F (36.1 °C)   Resp 18   Ht 5' 9.5\" (1.765 m)   Wt 79.9 kg (176 lb 3.2 oz)   SpO2 98%   BMI 25.65 kg/m²     Physical Exam  Vitals and nursing note reviewed.   Constitutional:       General: He is not in acute distress.     Appearance: Normal appearance. He is not ill-appearing, toxic-appearing or diaphoretic.      Comments: Pleasant 89-year-old male who is awake alert in no acute distress oriented x 3, overweight   HENT:      Head: Normocephalic and atraumatic.      Right Ear: Tympanic membrane, ear canal and external ear normal. There is no impacted cerumen.      Left Ear: Tympanic membrane, ear canal and external ear normal. There is no impacted cerumen.      Nose: Nose normal. No congestion or rhinorrhea.      Mouth/Throat:      Mouth: Mucous membranes are dry.      Pharynx: Oropharynx is clear. No oropharyngeal exudate or posterior oropharyngeal erythema.   Eyes:      General: No scleral icterus.        Right eye: No discharge.         Left eye: No discharge.      Extraocular Movements: Extraocular movements intact.      Conjunctiva/sclera: Conjunctivae normal.      Pupils: Pupils are equal, round, and reactive to light.   Neck:      Vascular: No carotid bruit.   Cardiovascular:      Rate and Rhythm: Normal " rate and regular rhythm.      Heart sounds: Normal heart sounds. No murmur heard.     No friction rub. No gallop.   Pulmonary:      Effort: Pulmonary effort is normal. No respiratory distress.      Breath sounds: Normal breath sounds. No stridor. No wheezing, rhonchi or rales.   Chest:      Chest wall: No tenderness.   Abdominal:      General: Abdomen is flat. Bowel sounds are normal. There is no distension.      Palpations: There is no mass.      Tenderness: There is no abdominal tenderness. There is no left CVA tenderness, guarding or rebound.      Hernia: No hernia is present.   Musculoskeletal:         General: Swelling present. No tenderness, deformity or signs of injury.      Cervical back: Normal range of motion and neck supple. No rigidity or tenderness.      Right lower leg: Edema present.      Left lower leg: No edema.      Comments: Patient has erythema and swelling distal portion of his right foot which she states has been going on for approximately 2 weeks.  No pain with palpation or with range of motion to his toes.  Peripheral pulses are weak but intact   Lymphadenopathy:      Cervical: No cervical adenopathy.   Skin:     General: Skin is warm and dry.      Coloration: Skin is not jaundiced or pale.      Findings: No bruising, erythema, lesion or rash.   Neurological:      Mental Status: He is alert and oriented to person, place, and time. Mental status is at baseline.      Cranial Nerves: No cranial nerve deficit.      Sensory: Sensory deficit present.      Motor: No weakness.      Coordination: Coordination abnormal.      Gait: Gait abnormal.      Deep Tendon Reflexes: Reflexes abnormal.      Comments: Patient does have a resting tremor from his Parkinson's disease noted in the left hand.  No rigidity or cogwheeling today.  Walking with a cane.  Patient does have peripheral neuropathy bilateral lower extremities with absent patella and Achilles tendon reflexes paresthesia bilateral lower extremities  and patient continues to follow-up with his podiatrist   Psychiatric:         Mood and Affect: Mood normal.         Behavior: Behavior normal.         Thought Content: Thought content normal.         Judgment: Judgment normal.

## 2025-02-04 NOTE — ASSESSMENT & PLAN NOTE
Patient states that he needs to make appointment with a new urologist with his previous urologist no longer being covered by his medical plan.  Patient is awaiting an appointment for evaluation and further treatment for his BPH.  No new urinary complaints and we did review the results of MRI of

## 2025-02-04 NOTE — ASSESSMENT & PLAN NOTE
History of gout.  Remains on allopurinol.  His endocrinologist has ordered patient to have a uric acid level checked.  No exacerbations of his gout

## 2025-03-04 ENCOUNTER — OFFICE VISIT (OUTPATIENT)
Dept: NEUROLOGY | Facility: CLINIC | Age: 89
End: 2025-03-04
Payer: COMMERCIAL

## 2025-03-04 VITALS
HEART RATE: 60 BPM | HEIGHT: 69 IN | BODY MASS INDEX: 26.07 KG/M2 | OXYGEN SATURATION: 98 % | TEMPERATURE: 98.6 F | SYSTOLIC BLOOD PRESSURE: 130 MMHG | WEIGHT: 176 LBS | DIASTOLIC BLOOD PRESSURE: 60 MMHG

## 2025-03-04 DIAGNOSIS — G20.A1 PARKINSON'S DISEASE (HCC): ICD-10-CM

## 2025-03-04 PROCEDURE — G2211 COMPLEX E/M VISIT ADD ON: HCPCS | Performed by: PHYSICIAN ASSISTANT

## 2025-03-04 PROCEDURE — 99213 OFFICE O/P EST LOW 20 MIN: CPT | Performed by: PHYSICIAN ASSISTANT

## 2025-03-04 RX ORDER — CARBIDOPA AND LEVODOPA 25; 100 MG/1; MG/1
TABLET ORAL
Qty: 450 TABLET | Refills: 3 | Status: SHIPPED | OUTPATIENT
Start: 2025-03-04

## 2025-03-04 NOTE — PROGRESS NOTES
Name: Trent Tam Jr.      : 1935      MRN: [id number]  Encounter Provider: Cliff Begum PA-C  Encounter Date: 3/4/2025   Encounter department: [facility]  :  Assessment & Plan  Parkinson's disease (HCC)  Patient continues to do very well from a parkinsonian standpoint.  Tremors may well-controlled.  He denies any on or off with medication.  Overall his exam is stable.  At this time we will not make any adjustments to his Sinemet dose.  He will remain on 1.5 tabs 3 times a day.  Certainly in the future if tremors were to worsen or he were to notice any changes in his Parkinson symptoms we could consider increasing his Sinemet dose further.    He was encouraged remain active.  He does continue to go bowling 3 times a week.    Orders:    carbidopa-levodopa (SINEMET)  mg per tablet; TAKE 1 & 1/2 (ONE & ONE-HALF) TABLETS BY MOUTH THREE TIMES DAILY          History of Present Illness   Trent Tam Jr. is an 89 year old RH male with CAD, HTN, DM, PVD, HLD and Parkinson's disease who presents for follow up.  To review, bilateral hand tremors present since around 2018 with significant improvement per his wife with the initiation of carbidopa/levodopa. On initial presentation he had minimal parkinsonism with rare right-sided myoclonus of the arm and leg.      At his last visit he was doing well with Sinemet and no changes were made.        INTERVAL HISTORY:  He feels that he is overall doing well   No change in his symptoms  Tremors remain well controlled   Np issues with daily functions   He can shower and dress on his own   Sleeps well, other than waking to use the bathroom   No issues with swallowing   No changes in memory  No hallucinations   He is still bowling 3 days a week     Current medications and timing:  Sinemet 25/100 1.5 tabs TID @ 9:30 3:30 9:30 - increasing doses has helped with tremor control         Review of Systems I have personally reviewed the  "MA's review of systems and made changes as necessary.         Objective   /60 (BP Location: Left arm, Patient Position: Sitting, Cuff Size: Adult)   Pulse 60   Temp 98.6 °F (37 °C) (Temporal)   Ht 5' 9\" (1.753 m)   Wt 79.8 kg (176 lb)   SpO2 98%   BMI 25.99 kg/m²     Physical Exam  Constitutional:       Appearance: Normal appearance.   HENT:      Right Ear: Hearing normal.      Left Ear: Hearing normal.   Eyes:      General: Lids are normal.      Extraocular Movements: Extraocular movements intact.      Pupils: Pupils are equal, round, and reactive to light.   Pulmonary:      Effort: Pulmonary effort is normal.   Neurological:      Mental Status: He is alert.      Motor: Motor strength is normal.  Psychiatric:         Speech: Speech normal.       Neurological Exam  Mental Status  Alert. Oriented to person, place and time. Speech is normal.    Cranial Nerves  CN III, IV, VI: Extraocular movements intact bilaterally. Normal lids and orbits bilaterally. Pupils equal round and reactive to light bilaterally.  CN V:  Right: Facial sensation is normal.  Left: Facial sensation is normal on the left.  CN VIII:  Right: Hearing is normal.  Left: Hearing is normal.  CN XI: Shoulder shrug strength is normal.  CN XII: Tongue midline without atrophy or fasciculations.    Motor   Strength is 5/5 throughout all four extremities.    Sensory  Light touch is normal in upper and lower extremities.     Reflexes  Glabellar tap present.    Coordination  Right: Finger-to-nose abnormality:Left: Finger-to-nose abnormality:    Gait    Arose without issues.  Slightly decreased stride, slightly wider based gait. Stooped posture. .     UPDRS motor:                              Time since last dose:  3/4/25 8/27/24   Speech  1 1   Facial Expression  1 1   Rigidity - Neck        Rigidity - Upper Extremity (Right)  0 0   Rigidity - Upper Extremity (Left)   0 0   Rigidity - Lower Extremity (Right)  0 0   Rigidity - Lower Extremity (Left)  "  0 0   Finger Taps (Right)   1 1   Finger Taps (Left)   2 2   Hand Movement (Right)  1 1   Hand Movement (Left)   1 1   Pronation/Supination (Right)  1 2   Pronation/Supination (Left)   1 1   Toe Tapping (Right) 1 1   Toe Tapping (Left) 1 0   Leg Agility (Right)  0 0   Leg Agility (Left)   0 0   Arising from Chair   0 0   Gait   1 1   Freezing of Gait 0 0   Postural Stability         Posture 1 1   Global spontaneity of movement 1 1   Postural Tremor (Right) 0 0   Postural Tremor (Left) 0 0   Kinetic Tremor (Right)  0 0   Kinetic Tremor (Left)  0 0   Rest tremor amplitude RUE 0 0   Rest tremor amplitude LUE 0 0   Rest tremor amplitude RLE 0 0   Reset tremor amplitude LLE 0 0   Lip/Jaw Tremor  0 0   Consistency of tremor 0 0   Motor Exam Total:

## 2025-03-04 NOTE — PROGRESS NOTES
Review of Systems   Constitutional:  Negative for appetite change, fatigue and fever.   HENT: Negative.  Negative for hearing loss, tinnitus, trouble swallowing and voice change.    Eyes: Negative.  Negative for photophobia, pain and visual disturbance.   Respiratory: Negative.  Negative for shortness of breath.    Cardiovascular: Negative.  Negative for palpitations.   Gastrointestinal: Negative.  Negative for nausea and vomiting.   Endocrine: Negative.  Negative for cold intolerance.   Genitourinary: Negative.  Negative for dysuria, frequency and urgency.   Musculoskeletal:  Negative for back pain, gait problem, myalgias, neck pain and neck stiffness.   Skin: Negative.  Negative for rash.   Allergic/Immunologic: Negative.    Neurological:  Negative for dizziness, tremors, seizures, syncope, facial asymmetry, speech difficulty, weakness, light-headedness, numbness and headaches.   Hematological: Negative.  Does not bruise/bleed easily.   Psychiatric/Behavioral: Negative.  Negative for confusion, hallucinations and sleep disturbance.

## 2025-03-04 NOTE — ASSESSMENT & PLAN NOTE
Patient continues to do very well from a parkinsonian standpoint.  Tremors may well-controlled.  He denies any on or off with medication.  Overall his exam is stable.  At this time we will not make any adjustments to his Sinemet dose.  He will remain on 1.5 tabs 3 times a day.  Certainly in the future if tremors were to worsen or he were to notice any changes in his Parkinson symptoms we could consider increasing his Sinemet dose further.    He was encouraged remain active.  He does continue to go bowling 3 times a week.    Orders:    carbidopa-levodopa (SINEMET)  mg per tablet; TAKE 1 & 1/2 (ONE & ONE-HALF) TABLETS BY MOUTH THREE TIMES DAILY

## 2025-03-28 DIAGNOSIS — M10.9 ACUTE GOUT OF RIGHT FOOT, UNSPECIFIED CAUSE: ICD-10-CM

## 2025-03-28 RX ORDER — ALLOPURINOL 100 MG/1
100 TABLET ORAL DAILY
Qty: 30 TABLET | Refills: 5 | Status: SHIPPED | OUTPATIENT
Start: 2025-03-28

## 2025-03-28 NOTE — TELEPHONE ENCOUNTER
Reason for call:   [x] Refill   [] Prior Auth  [] Other:     Office:   [x] PCP/Provider - Javid  [] Specialty/Provider -     Medication:   Allopurinol 100 mg, 1 qd, 90    Pharmacy:   [facility]   Does the patient have enough for 3 days?   [x] Yes   [] No - Send as HP to POD    Mail Away Pharmacy   Does the patient have enough for 10 days?   [] Yes   [] No - Send as HP to POD

## 2025-04-08 ENCOUNTER — APPOINTMENT (OUTPATIENT)
Dept: LAB | Facility: CLINIC | Age: 89
End: 2025-04-08
Payer: COMMERCIAL

## 2025-04-08 ENCOUNTER — TRANSCRIBE ORDERS (OUTPATIENT)
Dept: LAB | Facility: CLINIC | Age: 89
End: 2025-04-08

## 2025-04-08 DIAGNOSIS — I10 BENIGN ESSENTIAL HYPERTENSION: ICD-10-CM

## 2025-04-08 DIAGNOSIS — I51.9 MYXEDEMA HEART DISEASE: ICD-10-CM

## 2025-04-08 DIAGNOSIS — E78.5 HYPERLIPIDEMIA, UNSPECIFIED HYPERLIPIDEMIA TYPE: ICD-10-CM

## 2025-04-08 DIAGNOSIS — M10.9 ACUTE GOUT OF RIGHT FOOT, UNSPECIFIED CAUSE: ICD-10-CM

## 2025-04-08 DIAGNOSIS — E34.9 ENDOCRINE DISEASE: ICD-10-CM

## 2025-04-08 DIAGNOSIS — E13.69 OTHER SPECIFIED DIABETES MELLITUS WITH OTHER SPECIFIED COMPLICATION, UNSPECIFIED WHETHER LONG TERM INSULIN USE (HCC): ICD-10-CM

## 2025-04-08 DIAGNOSIS — E03.9 MYXEDEMA HEART DISEASE: ICD-10-CM

## 2025-04-08 DIAGNOSIS — E13.69 OTHER SPECIFIED DIABETES MELLITUS WITH OTHER SPECIFIED COMPLICATION, UNSPECIFIED WHETHER LONG TERM INSULIN USE (HCC): Primary | ICD-10-CM

## 2025-04-08 LAB
ALBUMIN SERPL BCG-MCNC: 4.3 G/DL (ref 3.5–5)
ALP SERPL-CCNC: 61 U/L (ref 34–104)
ALT SERPL W P-5'-P-CCNC: 7 U/L (ref 7–52)
ANION GAP SERPL CALCULATED.3IONS-SCNC: 11 MMOL/L (ref 4–13)
AST SERPL W P-5'-P-CCNC: 20 U/L (ref 13–39)
BACTERIA UR QL AUTO: NORMAL /HPF
BASOPHILS # BLD AUTO: 0.04 THOUSANDS/ÂΜL (ref 0–0.1)
BASOPHILS NFR BLD AUTO: 1 % (ref 0–1)
BILIRUB SERPL-MCNC: 0.83 MG/DL (ref 0.2–1)
BILIRUB UR QL STRIP: NEGATIVE
BUN SERPL-MCNC: 34 MG/DL (ref 5–25)
CALCIUM SERPL-MCNC: 9.6 MG/DL (ref 8.4–10.2)
CHLORIDE SERPL-SCNC: 103 MMOL/L (ref 96–108)
CLARITY UR: CLEAR
CO2 SERPL-SCNC: 27 MMOL/L (ref 21–32)
COLOR UR: NORMAL
CREAT SERPL-MCNC: 1.74 MG/DL (ref 0.6–1.3)
CREAT UR-MCNC: 88.8 MG/DL
CRP SERPL HS-MCNC: 1.48 MG/L
EOSINOPHIL # BLD AUTO: 0.31 THOUSAND/ÂΜL (ref 0–0.61)
EOSINOPHIL NFR BLD AUTO: 4 % (ref 0–6)
ERYTHROCYTE [DISTWIDTH] IN BLOOD BY AUTOMATED COUNT: 13.4 % (ref 11.6–15.1)
EST. AVERAGE GLUCOSE BLD GHB EST-MCNC: 186 MG/DL
GFR SERPL CREATININE-BSD FRML MDRD: 34 ML/MIN/1.73SQ M
GLUCOSE P FAST SERPL-MCNC: 133 MG/DL (ref 65–99)
GLUCOSE UR STRIP-MCNC: NEGATIVE MG/DL
HBA1C MFR BLD: 8.1 %
HCT VFR BLD AUTO: 44.7 % (ref 36.5–49.3)
HGB BLD-MCNC: 14.8 G/DL (ref 12–17)
HGB UR QL STRIP.AUTO: NEGATIVE
IMM GRANULOCYTES # BLD AUTO: 0.05 THOUSAND/UL (ref 0–0.2)
IMM GRANULOCYTES NFR BLD AUTO: 1 % (ref 0–2)
KETONES UR STRIP-MCNC: NEGATIVE MG/DL
LEUKOCYTE ESTERASE UR QL STRIP: NEGATIVE
LYMPHOCYTES # BLD AUTO: 1.73 THOUSANDS/ÂΜL (ref 0.6–4.47)
LYMPHOCYTES NFR BLD AUTO: 22 % (ref 14–44)
MAGNESIUM SERPL-MCNC: 1.9 MG/DL (ref 1.9–2.7)
MCH RBC QN AUTO: 31.2 PG (ref 26.8–34.3)
MCHC RBC AUTO-ENTMCNC: 33.1 G/DL (ref 31.4–37.4)
MCV RBC AUTO: 94 FL (ref 82–98)
MICROALBUMIN UR-MCNC: 17.5 MG/L
MICROALBUMIN/CREAT 24H UR: 20 MG/G CREATININE (ref 0–30)
MONOCYTES # BLD AUTO: 0.5 THOUSAND/ÂΜL (ref 0.17–1.22)
MONOCYTES NFR BLD AUTO: 6 % (ref 4–12)
NEUTROPHILS # BLD AUTO: 5.24 THOUSANDS/ÂΜL (ref 1.85–7.62)
NEUTS SEG NFR BLD AUTO: 66 % (ref 43–75)
NITRITE UR QL STRIP: NEGATIVE
NON-SQ EPI CELLS URNS QL MICRO: NORMAL /HPF
NRBC BLD AUTO-RTO: 0 /100 WBCS
PH UR STRIP.AUTO: 6 [PH]
PHOSPHATE SERPL-MCNC: 3.8 MG/DL (ref 2.3–4.1)
PLATELET # BLD AUTO: 180 THOUSANDS/UL (ref 149–390)
PMV BLD AUTO: 10.8 FL (ref 8.9–12.7)
POTASSIUM SERPL-SCNC: 4.7 MMOL/L (ref 3.5–5.3)
PROT SERPL-MCNC: 6.8 G/DL (ref 6.4–8.4)
PROT UR STRIP-MCNC: NEGATIVE MG/DL
RBC # BLD AUTO: 4.75 MILLION/UL (ref 3.88–5.62)
RBC #/AREA URNS AUTO: NORMAL /HPF
SODIUM SERPL-SCNC: 141 MMOL/L (ref 135–147)
SP GR UR STRIP.AUTO: 1.01 (ref 1–1.03)
T4 FREE SERPL-MCNC: 0.77 NG/DL (ref 0.61–1.12)
TSH SERPL DL<=0.05 MIU/L-ACNC: 3.42 UIU/ML (ref 0.45–4.5)
URATE SERPL-MCNC: 4.9 MG/DL (ref 3.5–8.5)
UROBILINOGEN UR STRIP-ACNC: <2 MG/DL
WBC # BLD AUTO: 7.87 THOUSAND/UL (ref 4.31–10.16)
WBC #/AREA URNS AUTO: NORMAL /HPF

## 2025-04-08 PROCEDURE — 83036 HEMOGLOBIN GLYCOSYLATED A1C: CPT

## 2025-04-08 PROCEDURE — 84443 ASSAY THYROID STIM HORMONE: CPT

## 2025-04-08 PROCEDURE — 84439 ASSAY OF FREE THYROXINE: CPT

## 2025-04-08 PROCEDURE — 81001 URINALYSIS AUTO W/SCOPE: CPT

## 2025-04-08 PROCEDURE — 82570 ASSAY OF URINE CREATININE: CPT

## 2025-04-08 PROCEDURE — 80053 COMPREHEN METABOLIC PANEL: CPT

## 2025-04-08 PROCEDURE — 85025 COMPLETE CBC W/AUTO DIFF WBC: CPT

## 2025-04-08 PROCEDURE — 82043 UR ALBUMIN QUANTITATIVE: CPT

## 2025-04-08 PROCEDURE — 86141 C-REACTIVE PROTEIN HS: CPT

## 2025-04-08 PROCEDURE — 84550 ASSAY OF BLOOD/URIC ACID: CPT

## 2025-04-08 PROCEDURE — 84100 ASSAY OF PHOSPHORUS: CPT

## 2025-04-08 PROCEDURE — 36415 COLL VENOUS BLD VENIPUNCTURE: CPT

## 2025-04-08 PROCEDURE — 83735 ASSAY OF MAGNESIUM: CPT

## 2025-06-02 NOTE — PROGRESS NOTES
Cardiology Follow Up    Trent Tam Jr.  1935  [id number]  [facility]  [address]  [place] [address] [phone]    Assessment & Plan  Shortness of breath  Progressive shortness of breath over the last 6 months.  Able to mow the grass up to 25 minutes until he needs to sit due to shortness of breath.  He denies lightheadedness or dizziness other than with changing position quickly.  SOB possibly due to deconditioning. He denies  chest pain, follow-up TTE in the near future evaluate wall function and valvular function.  Coronary artery disease involving native coronary artery of native heart without angina pectoris  hx of PCI to LAD in 1998, RCA in 2001  Denies chest pain.  Continue on aspirin 81 mg daily, simvastatin 40 mg daily, lisinopril 10 mg twice daily, fish oil 1000 mg every other day,  Not on AV ellie blocker due to sinus node dysfunction  Heart healthy diet  Mixed hyperlipidemia  8/24/23 , TG 56, HDL 44, LDL 61  Continue on Zocor 40 mg daily at bedtime  Heart healthy diet  Lipid panel in the near future   Benign essential hypertension  RUE sitting 120/60  Blood pressure well-controlled on lisinopril 10 mg p.o. BID,  DASH diet          Interval History:   Mr Trent Tam presents to our office for a follow up visit.  He is accompanied by his wife.  Trent admits to a 6 month hx of worsening shortness of breath with exertion such as mowing the grass after 25 minutes with the need to sit.  He denies symptoms of Chest pain , palpitations, lightheadedness or dizziness .  Trent denies SOB with walking up stairs.          Medical History   Primary Cardiologist Dr Kathleen previously followed by Dr Magana  CAD hx of PCI to LAD in 1998, RCA in 2001  Hypertension  Hyperlipidemia 8/24/23 , TG 56, HDL 44, LDL 61  SSS  Parkinson's Disease  2019 COVID       24 hour Holter monitor 1/14/21   1.   Dominant rhythm was sinus bradycardia, rate 33 to 111 , average 48 BPM.   2.  No high-grade block, longest RR interval was 2.1 sec.   3.  Circadian pattern was normal.   4.  PVC density was 5.1% with 331 couplets, 12 triplets,  one 4-beat IVR   5.  SVE density was 1.1 %.  There were 10 runs of PAT.  The longest was 11   beat but relatively slow.   6.  Diary was submitted with no symptoms reported.   7.  Compared to prior study of November 2018, PVC density is higher (1.5%   then), and HR overall is slower (ave 58 BPM then).     Problem List[1]  Past Medical History[2]  Social History     Socioeconomic History    Marital status: /Civil Union     Spouse name: Not on file    Number of children: Not on file    Years of education: Not on file    Highest education level: Not on file   Occupational History    Not on file   Tobacco Use    Smoking status: Former     Types: Cigarettes    Smokeless tobacco: Never   Vaping Use    Vaping status: Never Used   Substance and Sexual Activity    Alcohol use: Yes     Comment: sparingly    Drug use: No    Sexual activity: Not on file   Other Topics Concern    Not on file   Social History Narrative    Not on file     Social Drivers of Health     Financial Resource Strain: Low Risk  (4/27/2023)    Overall Financial Resource Strain (CARDIA)     Difficulty of Paying Living Expenses: Not very hard   Food Insecurity: No Food Insecurity (9/5/2024)    Nursing - Inadequate Food Risk Classification     Worried About Running Out of Food in the Last Year: Never true     Ran Out of Food in the Last Year: Never true     Ran Out of Food in the Last Year: Not on file   Transportation Needs: No Transportation Needs (9/5/2024)    PRAPARE - Transportation     Lack of Transportation (Medical): No     Lack of Transportation (Non-Medical): No   Physical Activity: Not on file   Stress: Not on file   Social Connections: Not on file   Intimate Partner Violence: Not on file   Housing Stability: Low Risk   (9/5/2024)    Housing Stability Vital Sign     Unable to Pay for Housing in the Last Year: No     Number of Times Moved in the Last Year: 0     Homeless in the Last Year: No      Family History[3]  Past Surgical History[4]  Current Medications[5]  Allergies   Allergen Reactions    Metformin GI Intolerance       Labs:  Appointment on 04/08/2025   Component Date Value    Color, UA 04/08/2025 Light Yellow     Clarity, UA 04/08/2025 Clear     Specific Gravity, UA 04/08/2025 1.014     pH, UA 04/08/2025 6.0     Leukocytes, UA 04/08/2025 Negative     Nitrite, UA 04/08/2025 Negative     Protein, UA 04/08/2025 Negative     Glucose, UA 04/08/2025 Negative     Ketones, UA 04/08/2025 Negative     Urobilinogen, UA 04/08/2025 <2.0     Bilirubin, UA 04/08/2025 Negative     Occult Blood, UA 04/08/2025 Negative     RBC, UA 04/08/2025 1-2     WBC, UA 04/08/2025 None Seen     Epithelial Cells 04/08/2025 None Seen     Bacteria, UA 04/08/2025 None Seen     Creatinine, Ur 04/08/2025 88.8     Albumin,U,Random 04/08/2025 17.5     Albumin Creat Ratio 04/08/2025 20     Sodium 04/08/2025 141     Potassium 04/08/2025 4.7     Chloride 04/08/2025 103     CO2 04/08/2025 27     ANION GAP 04/08/2025 11     BUN 04/08/2025 34 (H)     Creatinine 04/08/2025 1.74 (H)     Glucose, Fasting 04/08/2025 133 (H)     Calcium 04/08/2025 9.6     AST 04/08/2025 20     ALT 04/08/2025 7     Alkaline Phosphatase 04/08/2025 61     Total Protein 04/08/2025 6.8     Albumin 04/08/2025 4.3     Total Bilirubin 04/08/2025 0.83     eGFR 04/08/2025 34     WBC 04/08/2025 7.87     RBC 04/08/2025 4.75     Hemoglobin 04/08/2025 14.8     Hematocrit 04/08/2025 44.7     MCV 04/08/2025 94     MCH 04/08/2025 31.2     MCHC 04/08/2025 33.1     RDW 04/08/2025 13.4     MPV 04/08/2025 10.8     Platelets 04/08/2025 180     nRBC 04/08/2025 0     Segmented % 04/08/2025 66     Immature Grans % 04/08/2025 1     Lymphocytes % 04/08/2025 22     Monocytes % 04/08/2025 6     Eosinophils Relative  04/08/2025 4     Basophils Relative 04/08/2025 1     Absolute Neutrophils 04/08/2025 5.24     Absolute Immature Grans 04/08/2025 0.05     Absolute Lymphocytes 04/08/2025 1.73     Absolute Monocytes 04/08/2025 0.50     Eosinophils Absolute 04/08/2025 0.31     Basophils Absolute 04/08/2025 0.04     TSH 3RD GENERATON 04/08/2025 3.422     Hemoglobin A1C 04/08/2025 8.1 (H)     EAG 04/08/2025 186     Magnesium 04/08/2025 1.9     Phosphorus 04/08/2025 3.8     CRP, High Sensitivity 04/08/2025 1.48     Uric Acid 04/08/2025 4.9     Free T4 04/08/2025 0.77      Imaging: No results found.    Review of Systems:  Review of Systems   Respiratory:  Positive for shortness of breath.    Musculoskeletal:  Positive for arthralgias and myalgias.   All other systems reviewed and are negative.      Physical Exam:  Physical Exam  Vitals reviewed.   Constitutional:       Appearance: He is well-developed.   HENT:      Head: Normocephalic.     Eyes:      Pupils: Pupils are equal, round, and reactive to light.       Cardiovascular:      Rate and Rhythm: Normal rate and regular rhythm.      Comments: Ectopic beats   Pulmonary:      Effort: Pulmonary effort is normal.      Breath sounds: Normal breath sounds.     Musculoskeletal:         General: Normal range of motion.      Cervical back: Normal range of motion.      Right lower leg: No edema.      Left lower leg: No edema.     Skin:     General: Skin is warm and dry.      Capillary Refill: Capillary refill takes less than 2 seconds.     Neurological:      General: No focal deficit present.      Mental Status: He is alert and oriented to person, place, and time.     Psychiatric:         Mood and Affect: Mood normal.         Behavior: Behavior normal.              [1]   Patient Active Problem List  Diagnosis    Arteriosclerotic cardiovascular disease    Benign essential hypertension    Diabetes mellitus (HCC)    Enlarged prostate without lower urinary tract symptoms (luts)    Hyperlipidemia     Coronary artery disease involving native coronary artery of native heart    Lactic acidosis    Dizziness    Other age-related cataract    Type 2 diabetes mellitus with diabetic peripheral 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 (HCC)    Peripheral vascular disease, unspecified (HCC)    Blood in stool, mis    Tremor    Right arm weakness    Parkinson's disease (HCC)    Bradycardia    Shortness of breath    PANTOJA (dyspnea on exertion)    COVID-19    Bronchitis    Localized swelling of right foot    Acute gout of right foot    Sinus node dysfunction (HCC)   [2]   Past Medical History:  Diagnosis Date    Diabetes mellitus (HCC)     Hypertension    [3]   Family History  Problem Relation Name Age of Onset    Diabetes Mother      Heart attack Mother      Stroke Father      Hypertension Father     [4]   Past Surgical History:  Procedure Laterality Date    CARDIAC SURGERY     [5]   Current Outpatient Medications:     albuterol (PROVENTIL HFA,VENTOLIN HFA) 90 mcg/act inhaler, Inhale 2 puffs every 6 (six) hours as needed for wheezing or shortness of breath, Disp: 18 g, Rfl: 1    allopurinol (ZYLOPRIM) 100 mg tablet, Take 1 tablet (100 mg total) by mouth daily, Disp: 30 tablet, Rfl: 5    aspirin (ECOTRIN LOW STRENGTH) 81 mg EC tablet, Take 1 tablet by mouth daily, Disp: , Rfl:     b complex vitamins capsule, Take 1 capsule by mouth daily, Disp: , Rfl:     benzonatate (TESSALON PERLES) 100 mg capsule, Take 1 capsule (100 mg total) by mouth 3 (three) times a day as needed for cough, Disp: 45 capsule, Rfl: 2    carbidopa-levodopa (SINEMET)  mg per tablet, TAKE 1 & 1/2 (ONE & ONE-HALF) TABLETS BY MOUTH THREE TIMES DAILY, Disp: 450 tablet, Rfl: 3    cholecalciferol (VITAMIN D3) 1,000 units tablet, Take 1,000 Units by mouth daily, Disp: , Rfl:     Coenzyme Q10 (COQ10) 100 MG CAPS, Take by mouth daily, Disp: , Rfl:     colchicine (COLCRYS) 0.6 mg tablet, Take 1 tablet (0.6 mg total) by mouth daily, Disp: 30 tablet, Rfl: 5    insulin detemir  (LEVEMIR) 100 units/mL subcutaneous injection, Inject under the skin daily at bedtime 15units Qam and 18units QHS, Disp: , Rfl:     Lantus SoloStar 100 units/mL SOPN, Inject 20 Units as directed 2 (two) times a day, Disp: , Rfl:     lisinopril (ZESTRIL) 10 mg tablet, Take 1 tablet (10 mg total) by mouth 2 (two) times a day, Disp: 180 tablet, Rfl: 3    Multiple Vitamin (MULTIVITAMIN) tablet, Take 1 tablet by mouth daily, Disp: , Rfl:     multivitamin (THERAGRAN) TABS, Take 1 tablet by mouth daily, Disp: , Rfl:     Omega-3 Fatty Acids (FISH OIL) 1,000 mg, Take 1,000 mg by mouth every other day  , Disp: , Rfl:     ONE TOUCH ULTRA TEST test strip, , Disp: , Rfl:     simvastatin (ZOCOR) 40 mg tablet, Take 40 mg by mouth daily at bedtime  , Disp: , Rfl:

## 2025-06-03 ENCOUNTER — OFFICE VISIT (OUTPATIENT)
Dept: CARDIOLOGY CLINIC | Facility: CLINIC | Age: 89
End: 2025-06-03
Payer: COMMERCIAL

## 2025-06-03 VITALS
BODY MASS INDEX: 25.61 KG/M2 | WEIGHT: 173.4 LBS | OXYGEN SATURATION: 99 % | SYSTOLIC BLOOD PRESSURE: 120 MMHG | DIASTOLIC BLOOD PRESSURE: 60 MMHG | HEART RATE: 64 BPM

## 2025-06-03 DIAGNOSIS — I25.10 CORONARY ARTERY DISEASE INVOLVING NATIVE CORONARY ARTERY OF NATIVE HEART WITHOUT ANGINA PECTORIS: ICD-10-CM

## 2025-06-03 DIAGNOSIS — E78.2 MIXED HYPERLIPIDEMIA: ICD-10-CM

## 2025-06-03 DIAGNOSIS — R06.02 SHORTNESS OF BREATH: Primary | ICD-10-CM

## 2025-06-03 DIAGNOSIS — I10 BENIGN ESSENTIAL HYPERTENSION: ICD-10-CM

## 2025-06-03 PROCEDURE — 99214 OFFICE O/P EST MOD 30 MIN: CPT | Performed by: NURSE PRACTITIONER

## 2025-06-03 NOTE — ASSESSMENT & PLAN NOTE
hx of PCI to LAD in 1998, RCA in 2001  Denies chest pain.  Continue on aspirin 81 mg daily, simvastatin 40 mg daily, lisinopril 10 mg twice daily, fish oil 1000 mg every other day,  Not on AV ellie blocker due to sinus node dysfunction  Heart healthy diet

## 2025-06-03 NOTE — ASSESSMENT & PLAN NOTE
8/24/23 , TG 56, HDL 44, LDL 61  Continue on Zocor 40 mg daily at bedtime  Heart healthy diet  Lipid panel in the near future

## 2025-06-03 NOTE — ASSESSMENT & PLAN NOTE
Progressive shortness of breath over the last 6 months.  Able to mow the grass up to 25 minutes until he needs to sit due to shortness of breath.  He denies lightheadedness or dizziness other than with changing position quickly.  SOB possibly due to deconditioning. He denies  chest pain, follow-up TTE in the near future evaluate wall function and valvular function.

## 2025-06-09 ENCOUNTER — RESULTS FOLLOW-UP (OUTPATIENT)
Dept: CARDIOLOGY CLINIC | Facility: CLINIC | Age: 89
End: 2025-06-09

## 2025-06-09 ENCOUNTER — APPOINTMENT (OUTPATIENT)
Dept: LAB | Facility: CLINIC | Age: 89
End: 2025-06-09
Payer: COMMERCIAL

## 2025-06-09 DIAGNOSIS — E78.2 MIXED HYPERLIPIDEMIA: ICD-10-CM

## 2025-06-09 LAB
CHOLEST SERPL-MCNC: 119 MG/DL (ref ?–200)
HDLC SERPL-MCNC: 40 MG/DL
LDLC SERPL CALC-MCNC: 60 MG/DL (ref 0–100)
NONHDLC SERPL-MCNC: 79 MG/DL
TRIGL SERPL-MCNC: 94 MG/DL (ref ?–150)

## 2025-06-09 PROCEDURE — 80061 LIPID PANEL: CPT

## 2025-06-10 ENCOUNTER — OFFICE VISIT (OUTPATIENT)
Age: 89
End: 2025-06-10
Payer: COMMERCIAL

## 2025-06-10 VITALS
BODY MASS INDEX: 25.77 KG/M2 | HEART RATE: 52 BPM | OXYGEN SATURATION: 98 % | SYSTOLIC BLOOD PRESSURE: 138 MMHG | RESPIRATION RATE: 16 BRPM | TEMPERATURE: 97 F | WEIGHT: 174 LBS | DIASTOLIC BLOOD PRESSURE: 88 MMHG | HEIGHT: 69 IN

## 2025-06-10 DIAGNOSIS — I25.10 ARTERIOSCLEROTIC CARDIOVASCULAR DISEASE: ICD-10-CM

## 2025-06-10 DIAGNOSIS — G20.B2 PARKINSON'S DISEASE WITH DYSKINESIA AND FLUCTUATING MANIFESTATIONS (HCC): ICD-10-CM

## 2025-06-10 DIAGNOSIS — I73.9 PERIPHERAL VASCULAR DISEASE, UNSPECIFIED (HCC): ICD-10-CM

## 2025-06-10 DIAGNOSIS — R00.1 BRADYCARDIA: ICD-10-CM

## 2025-06-10 DIAGNOSIS — R06.02 SHORTNESS OF BREATH: Primary | ICD-10-CM

## 2025-06-10 DIAGNOSIS — E78.00 PURE HYPERCHOLESTEROLEMIA: ICD-10-CM

## 2025-06-10 DIAGNOSIS — M10.9 ACUTE GOUT OF RIGHT FOOT, UNSPECIFIED CAUSE: ICD-10-CM

## 2025-06-10 PROCEDURE — 99214 OFFICE O/P EST MOD 30 MIN: CPT | Performed by: INTERNAL MEDICINE

## 2025-06-10 PROCEDURE — G2211 COMPLEX E/M VISIT ADD ON: HCPCS | Performed by: INTERNAL MEDICINE

## 2025-06-10 NOTE — PROGRESS NOTES
Name: Trent Tam Jr.      : 1935      MRN: [id number]  Encounter Provider: Jaswant Hua DO  Encounter Date: 6/10/2025   Encounter department: [facility] INTERNAL MEDICINE    Assessment & Plan  Shortness of breath  Patient has had some difficulty with increasing shortness of breath especially with exertion.  His cardiologist feels this may be cardiac in nature and we will be sending him for repeat echocardiogram to look at his heart function.  I feel that this may actually have a respiratory component also and we will be sending patient for chest x-ray.  O2 sat on room air 98%.  Patient does have some decreased breath sounds noted posteriorly on the left but no rales rhonchi or wheezes.  We will await the results of the chest x-ray and also the cardiogram, echocardiogram that will be performed in the next few months.  Further adjustment with medication if needed.    Orders:  •  XR chest pa and lateral; Future    Peripheral vascular disease, unspecified (HCC)  With recent evaluation by his podiatrist they felt that his peripheral pulses were very difficult to palpate and they had concerns about his circulation.  Patient went for vascular studies and we discussed the results.  Patient's blood flow to his lower extremities is adequate for healing but decreased.         Parkinson's disease with dyskinesia and fluctuating manifestations (HCC)  Patient has a history of Parkinson disease and continues to follow-up with his neurologist.  Remains on medication, Sinemet and states from a functional status no major disability other than unable to perform activities as well as in the past because of shortness of breath.         Pure hypercholesterolemia  Had a lipid profile performed recently and his cholesterol is stable.  Remains on Zocor 40 mg daily.         Bradycardia  Heart rate is usually slow.  Patient is on no beta-blockers.  His heart rate today was noted to be 52.  If chest x-ray and echo  are negative might consider checking on his rhythm with Holter or another study         Arteriosclerotic cardiovascular disease  Continues to follow-up with cardiology.  Again he is complaining of increasing shortness of breath with exertion, decreased exercise tolerance.  Will be going for echo         Acute gout of right foot, unspecified cause  Remains on allopurinol.  Uric acid level is stable.              History of Present Illness     Patient is an 89-year-old male with a history of multiple medical problems outlined previously who is here today for routine follow-up accompanied by his wife.  States he was recently seen by his cardiologist and was complaining of decreased exercise tolerance shortness of breath with exertion and he will be going for echocardiogram.  Denies any cough or chest congestion.  No increased swelling to lower extremities.  Continues to follow-up with all his specialists including endocrinology, cardiology, neurology, podiatry  Review of Systems   Constitutional:  Positive for activity change. Negative for appetite change, chills, diaphoresis, fatigue, fever and unexpected weight change.        Has had a decreased activity level.  States that it only can work especially mowing the lawn for short period of time without severe shortness of breath   HENT: Negative.     Eyes: Negative.    Respiratory:  Positive for shortness of breath. Negative for apnea, cough, choking, chest tightness, wheezing and stridor.    Cardiovascular: Negative.    Gastrointestinal: Negative.    Endocrine: Negative.    Genitourinary: Negative.    Musculoskeletal: Negative.         Diffuse arthralgias nothing acute   Skin: Negative.    Allergic/Immunologic: Negative.    Neurological:  Positive for tremors. Negative for seizures, syncope, facial asymmetry, speech difficulty, weakness, light-headedness, numbness and headaches.   Hematological: Negative.    Psychiatric/Behavioral: Negative.     Past Medical  "History[1]  Past Surgical History[2]  Family History[3]  Social History[4]  Medications[5]  Allergies   Allergen Reactions   • Metformin GI Intolerance     Immunization History   Administered Date(s) Administered   • COVID-19 Moderna mRNA Vaccine 12 Yr+ 50 mcg/0.5 mL (Spikevax) 10/17/2023   • COVID-19 PFIZER VACCINE 0.3 ML IM 01/21/2021, 02/09/2021, 10/30/2021   • COVID-19 Pfizer Vac BIVALENT Dipesh-sucrose 12 Yr+ IM 09/29/2022   • COVID-19 Pfizer mRNA vacc PF dipesh-sucrose 12 yr and older (Comirnaty) 11/05/2024   • Influenza, high dose seasonal 0.7 mL 10/31/2019, 09/16/2020, 11/01/2022     Objective   /88   Pulse (!) 52   Temp (!) 97 °F (36.1 °C)   Resp 16   Ht 5' 9\" (1.753 m)   Wt 78.9 kg (174 lb)   SpO2 98%   BMI 25.70 kg/m²     Physical Exam  Vitals and nursing note reviewed.   Constitutional:       General: He is not in acute distress.     Appearance: Normal appearance. He is not ill-appearing, toxic-appearing or diaphoretic.      Comments: Pleasant articulate 89-year-old male who is awake alert in no acute distress oriented x 3   HENT:      Head: Normocephalic and atraumatic.      Right Ear: Tympanic membrane, ear canal and external ear normal. There is no impacted cerumen.      Left Ear: Tympanic membrane, ear canal and external ear normal. There is no impacted cerumen.      Nose: Nose normal. No congestion or rhinorrhea.      Mouth/Throat:      Mouth: Mucous membranes are dry.      Pharynx: Oropharynx is clear. No oropharyngeal exudate or posterior oropharyngeal erythema.     Eyes:      General: No scleral icterus.        Right eye: No discharge.         Left eye: No discharge.      Extraocular Movements: Extraocular movements intact.      Conjunctiva/sclera: Conjunctivae normal.      Pupils: Pupils are equal, round, and reactive to light.     Neck:      Vascular: No carotid bruit.     Cardiovascular:      Rate and Rhythm: Normal rate and regular rhythm.      Heart sounds: Normal heart sounds. No " murmur heard.     No friction rub. No gallop.   Pulmonary:      Effort: Pulmonary effort is normal. No respiratory distress.      Breath sounds: No stridor. No wheezing, rhonchi or rales.      Comments: Patient has decreased breath sounds and anteriorly and posteriorly most notable on the left-hand side.  No rales rhonchi or wheezes  Chest:      Chest wall: No tenderness.   Abdominal:      General: Abdomen is flat. Bowel sounds are normal. There is no distension.      Palpations: There is no mass.      Tenderness: There is no abdominal tenderness. There is no left CVA tenderness, guarding or rebound.      Hernia: No hernia is present.     Musculoskeletal:         General: Swelling present. No tenderness, deformity or signs of injury.      Cervical back: Normal range of motion and neck supple. No rigidity or tenderness.      Right lower leg: Edema present.      Left lower leg: No edema.      Comments: Peripheral pulses are weak but intact   Lymphadenopathy:      Cervical: No cervical adenopathy.     Skin:     General: Skin is warm and dry.      Coloration: Skin is not jaundiced or pale.      Findings: No bruising, erythema, lesion or rash.     Neurological:      Mental Status: He is alert and oriented to person, place, and time. Mental status is at baseline.      Cranial Nerves: No cranial nerve deficit.      Sensory: Sensory deficit present.      Motor: No weakness.      Coordination: Coordination abnormal.      Gait: Gait abnormal.      Deep Tendon Reflexes: Reflexes abnormal.      Comments: Patient does have a resting tremor from his Parkinson's disease noted in the left hand.  No rigidity or cogwheeling today.  Walking with a cane.  Patient does have peripheral neuropathy bilateral lower extremities with absent patella and Achilles tendon reflexes paresthesia bilateral lower extremities and patient continues to follow-up with his podiatrist   Psychiatric:         Mood and Affect: Mood normal.         Behavior:  Behavior normal.         Thought Content: Thought content normal.         Judgment: Judgment normal.            [1]  Past Medical History:  Diagnosis Date   • Diabetes mellitus (HCC)    • Hypertension    [2]  Past Surgical History:  Procedure Laterality Date   • CARDIAC SURGERY     [3]  Family History  Problem Relation Name Age of Onset   • Diabetes Mother     • Heart attack Mother     • Stroke Father     • Hypertension Father     [4]  Social History  Tobacco Use   • Smoking status: Former     Types: Cigarettes   • Smokeless tobacco: Never   Vaping Use   • Vaping status: Never Used   Substance and Sexual Activity   • Alcohol use: Yes     Comment: sparingly   • Drug use: No   [5]  Current Outpatient Medications on File Prior to Visit   Medication Sig   • allopurinol (ZYLOPRIM) 100 mg tablet Take 1 tablet (100 mg total) by mouth daily   • aspirin (ECOTRIN LOW STRENGTH) 81 mg EC tablet Take 1 tablet by mouth in the morning.   • b complex vitamins capsule Take 1 capsule by mouth in the morning.   • carbidopa-levodopa (SINEMET)  mg per tablet TAKE 1 & 1/2 (ONE & ONE-HALF) TABLETS BY MOUTH THREE TIMES DAILY   • cholecalciferol (VITAMIN D3) 1,000 units tablet Take 1,000 Units by mouth in the morning.   • Coenzyme Q10 (COQ10) 100 MG CAPS Take by mouth in the morning.   • colchicine (COLCRYS) 0.6 mg tablet Take 1 tablet (0.6 mg total) by mouth daily   • Lantus SoloStar 100 units/mL SOPN Inject 20 Units as directed 2 (two) times a day   • lisinopril (ZESTRIL) 10 mg tablet Take 1 tablet (10 mg total) by mouth 2 (two) times a day   • Multiple Vitamin (MULTIVITAMIN) tablet Take 1 tablet by mouth in the morning.   • Omega-3 Fatty Acids (FISH OIL) 1,000 mg Take 1,000 mg by mouth every other day   • ONE TOUCH ULTRA TEST test strip    • simvastatin (ZOCOR) 40 mg tablet Take 40 mg by mouth daily at bedtime   • albuterol (PROVENTIL HFA,VENTOLIN HFA) 90 mcg/act inhaler Inhale 2 puffs every 6 (six) hours as needed for wheezing or  shortness of breath (Patient not taking: Reported on 6/3/2025)   • benzonatate (TESSALON PERLES) 100 mg capsule Take 1 capsule (100 mg total) by mouth 3 (three) times a day as needed for cough (Patient not taking: Reported on 6/10/2025)   • insulin detemir (LEVEMIR) 100 units/mL subcutaneous injection Inject under the skin daily at bedtime 15units Qam and 18units QHS (Patient not taking: Reported on 6/10/2025)   • multivitamin (THERAGRAN) TABS Take 1 tablet by mouth daily (Patient not taking: Reported on 6/3/2025)

## 2025-06-10 NOTE — ASSESSMENT & PLAN NOTE
Continues to follow-up with cardiology.  Again he is complaining of increasing shortness of breath with exertion, decreased exercise tolerance.  Will be going for echo

## 2025-06-10 NOTE — ASSESSMENT & PLAN NOTE
Had a lipid profile performed recently and his cholesterol is stable.  Remains on Zocor 40 mg daily.

## 2025-06-10 NOTE — ASSESSMENT & PLAN NOTE
Patient has had some difficulty with increasing shortness of breath especially with exertion.  His cardiologist feels this may be cardiac in nature and we will be sending him for repeat echocardiogram to look at his heart function.  I feel that this may actually have a respiratory component also and we will be sending patient for chest x-ray.  O2 sat on room air 98%.  Patient does have some decreased breath sounds noted posteriorly on the left but no rales rhonchi or wheezes.  We will await the results of the chest x-ray and also the cardiogram, echocardiogram that will be performed in the next few months.  Further adjustment with medication if needed.    Orders:    XR chest pa and lateral; Future

## 2025-06-10 NOTE — ASSESSMENT & PLAN NOTE
Patient has a history of Parkinson disease and continues to follow-up with his neurologist.  Remains on medication, Sinemet and states from a functional status no major disability other than unable to perform activities as well as in the past because of shortness of breath.

## 2025-06-10 NOTE — ASSESSMENT & PLAN NOTE
Heart rate is usually slow.  Patient is on no beta-blockers.  His heart rate today was noted to be 52.  If chest x-ray and echo are negative might consider checking on his rhythm with Holter or another study

## 2025-06-12 ENCOUNTER — HOSPITAL ENCOUNTER (OUTPATIENT)
Dept: RADIOLOGY | Facility: HOSPITAL | Age: 89
Discharge: HOME | End: 2025-06-12
Payer: COMMERCIAL

## 2025-06-12 DIAGNOSIS — R06.02 SHORTNESS OF BREATH: ICD-10-CM

## 2025-06-12 PROCEDURE — 71046 X-RAY EXAM CHEST 2 VIEWS: CPT

## 2025-06-17 ENCOUNTER — TELEPHONE (OUTPATIENT)
Age: 89
End: 2025-06-17

## 2025-06-17 NOTE — TELEPHONE ENCOUNTER
New Patient      Insurance   Current Insurance?[employer]   Insurance E-verified? Y    History   Reason for appointment/active symptoms?  Dr. Raya is retiring     Has the patient had any previous Urologist(s)? Dr. Raya     Was the patient seen in the ED? N     Labs/Imaging(Including Out Of Network)? Y     Records Requested? Y  Records Visible in EPIC? Y      Personal history of cancer? N     Appointment   Office location preference:  [place]  ?   Appointment Details   Date:  9/9/25  Time:  3:00 PM   Location:  [place]  Provider:  Sachin  Does the appointment need further review?

## 2025-07-22 ENCOUNTER — HOSPITAL ENCOUNTER (OUTPATIENT)
Dept: NON INVASIVE DIAGNOSTICS | Facility: CLINIC | Age: 89
Discharge: HOME | End: 2025-07-22
Attending: NURSE PRACTITIONER
Payer: COMMERCIAL

## 2025-07-22 VITALS
HEIGHT: 69 IN | BODY MASS INDEX: 25.76 KG/M2 | WEIGHT: 173.94 LBS | HEART RATE: 58 BPM | SYSTOLIC BLOOD PRESSURE: 138 MMHG | DIASTOLIC BLOOD PRESSURE: 88 MMHG

## 2025-07-22 DIAGNOSIS — R06.02 SHORTNESS OF BREATH: ICD-10-CM

## 2025-07-22 LAB
AORTIC ROOT: 3.4 CM
ASCENDING AORTA: 3.4 CM
BSA FOR ECHO PROCEDURE: 1.95 M2
E WAVE DECELERATION TIME: 272 MS
E/A RATIO: 0.67
FRACTIONAL SHORTENING: 32 (ref 28–44)
INTERVENTRICULAR SEPTUM IN DIASTOLE (PARASTERNAL SHORT AXIS VIEW): 0.9 CM
INTERVENTRICULAR SEPTUM: 0.9 CM (ref 0.6–1.1)
LAAS-AP2: 21.6 CM2
LAAS-AP4: 22.5 CM2
LEFT ATRIUM AREA SYSTOLE SINGLE PLANE A4C: 24.6 CM2
LEFT ATRIUM SIZE: 4.2 CM
LEFT ATRIUM VOLUME (MOD BIPLANE): 64 ML
LEFT ATRIUM VOLUME INDEX (MOD BIPLANE): 32.8 ML/M2
LEFT INTERNAL DIMENSION IN SYSTOLE: 3.4 CM (ref 2.1–4)
LEFT VENTRICLE DIASTOLIC VOLUME (MOD BIPLANE): 89 ML
LEFT VENTRICLE DIASTOLIC VOLUME INDEX (MOD BIPLANE): 45.6 ML/M2
LEFT VENTRICLE SYSTOLIC VOLUME (MOD BIPLANE): 31 ML
LEFT VENTRICLE SYSTOLIC VOLUME INDEX (MOD BIPLANE): 15.9 ML/M2
LEFT VENTRICULAR INTERNAL DIMENSION IN DIASTOLE: 5 CM (ref 3.5–6)
LEFT VENTRICULAR POSTERIOR WALL IN END DIASTOLE: 0.9 CM
LEFT VENTRICULAR STROKE VOLUME: 74 ML
LV EF BIPLANE MOD: 65 %
LV EF US.2D.A4C+ESTIMATED: 63 %
LVSV (TEICH): 74 ML
MV E'TISSUE VEL-SEP: 4 CM/S
MV PEAK A VEL: 0.99 M/S
MV PEAK E VEL: 66 CM/S
MV STENOSIS PRESSURE HALF TIME: 79 MS
MV VALVE AREA P 1/2 METHOD: 2.78
RA PRESSURE ESTIMATED: 3 MMHG
RIGHT ATRIUM AREA SYSTOLE A4C: 20.4 CM2
RIGHT VENTRICLE ID DIMENSION: 3.1 CM
RV PSP: 28 MMHG
SL CV LEFT ATRIUM LENGTH A2C: 6.2 CM
SL CV LV EF: 65
SL CV PED ECHO LEFT VENTRICLE DIASTOLIC VOLUME (MOD BIPLANE) 2D: 121 ML
SL CV PED ECHO LEFT VENTRICLE SYSTOLIC VOLUME (MOD BIPLANE) 2D: 47 ML
TR MAX PG: 25 MMHG
TR PEAK VELOCITY: 2.5 M/S
TRICUSPID ANNULAR PLANE SYSTOLIC EXCURSION: 2 CM
TRICUSPID VALVE PEAK REGURGITATION VELOCITY: 2.52 M/S

## 2025-07-22 PROCEDURE — 93306 TTE W/DOPPLER COMPLETE: CPT

## 2025-07-22 PROCEDURE — 93306 TTE W/DOPPLER COMPLETE: CPT | Performed by: STUDENT IN AN ORGANIZED HEALTH CARE EDUCATION/TRAINING PROGRAM

## 2025-08-19 ENCOUNTER — TELEPHONE (OUTPATIENT)
Dept: NEUROLOGY | Facility: CLINIC | Age: 89
End: 2025-08-19